# Patient Record
Sex: MALE | Race: WHITE | NOT HISPANIC OR LATINO | Employment: OTHER | URBAN - METROPOLITAN AREA
[De-identification: names, ages, dates, MRNs, and addresses within clinical notes are randomized per-mention and may not be internally consistent; named-entity substitution may affect disease eponyms.]

---

## 2018-01-23 ENCOUNTER — OFFICE VISIT CONVERTED (OUTPATIENT)
Dept: GASTROENTEROLOGY | Facility: CLINIC | Age: 83
End: 2018-01-23
Attending: INTERNAL MEDICINE

## 2018-05-02 ENCOUNTER — OFFICE VISIT CONVERTED (OUTPATIENT)
Dept: GASTROENTEROLOGY | Facility: CLINIC | Age: 83
End: 2018-05-02
Attending: PHYSICIAN ASSISTANT

## 2018-09-17 ENCOUNTER — CONVERSION ENCOUNTER (OUTPATIENT)
Dept: FAMILY MEDICINE CLINIC | Facility: CLINIC | Age: 83
End: 2018-09-17

## 2018-09-17 ENCOUNTER — OFFICE VISIT CONVERTED (OUTPATIENT)
Dept: FAMILY MEDICINE CLINIC | Facility: CLINIC | Age: 83
End: 2018-09-17
Attending: FAMILY MEDICINE

## 2018-09-24 ENCOUNTER — OFFICE VISIT CONVERTED (OUTPATIENT)
Dept: SURGERY | Facility: CLINIC | Age: 83
End: 2018-09-24
Attending: SURGERY

## 2018-09-24 ENCOUNTER — CONVERSION ENCOUNTER (OUTPATIENT)
Dept: SURGERY | Facility: CLINIC | Age: 83
End: 2018-09-24

## 2018-10-09 ENCOUNTER — OFFICE VISIT CONVERTED (OUTPATIENT)
Dept: SURGERY | Facility: CLINIC | Age: 83
End: 2018-10-09
Attending: SURGERY

## 2018-10-09 ENCOUNTER — CONVERSION ENCOUNTER (OUTPATIENT)
Dept: SURGERY | Facility: CLINIC | Age: 83
End: 2018-10-09

## 2018-11-06 ENCOUNTER — OFFICE VISIT CONVERTED (OUTPATIENT)
Dept: CARDIOLOGY | Facility: CLINIC | Age: 83
End: 2018-11-06
Attending: SPECIALIST

## 2019-04-09 ENCOUNTER — HOSPITAL ENCOUNTER (OUTPATIENT)
Dept: FAMILY MEDICINE CLINIC | Facility: CLINIC | Age: 84
Discharge: HOME | End: 2019-04-09
Attending: FAMILY MEDICINE

## 2019-04-15 ENCOUNTER — OFFICE VISIT CONVERTED (OUTPATIENT)
Dept: SURGERY | Facility: CLINIC | Age: 84
End: 2019-04-15
Attending: SURGERY

## 2019-04-17 ENCOUNTER — HOSPITAL ENCOUNTER (OUTPATIENT)
Dept: ULTRASOUND IMAGING | Facility: HOSPITAL | Age: 84
Discharge: HOME | End: 2019-04-17
Attending: FAMILY MEDICINE

## 2019-04-22 ENCOUNTER — OFFICE VISIT CONVERTED (OUTPATIENT)
Dept: GASTROENTEROLOGY | Facility: CLINIC | Age: 84
End: 2019-04-22
Attending: PHYSICIAN ASSISTANT

## 2019-04-29 ENCOUNTER — OFFICE VISIT CONVERTED (OUTPATIENT)
Dept: ORTHOPEDIC SURGERY | Facility: CLINIC | Age: 84
End: 2019-04-29
Attending: ORTHOPAEDIC SURGERY

## 2019-05-10 ENCOUNTER — OFFICE VISIT CONVERTED (OUTPATIENT)
Dept: CARDIOLOGY | Facility: CLINIC | Age: 84
End: 2019-05-10
Attending: SPECIALIST

## 2019-09-09 ENCOUNTER — OFFICE VISIT CONVERTED (OUTPATIENT)
Dept: FAMILY MEDICINE CLINIC | Facility: CLINIC | Age: 84
End: 2019-09-09
Attending: FAMILY MEDICINE

## 2019-09-09 ENCOUNTER — HOSPITAL ENCOUNTER (OUTPATIENT)
Dept: FAMILY MEDICINE CLINIC | Facility: CLINIC | Age: 84
Discharge: HOME | End: 2019-09-09
Attending: FAMILY MEDICINE

## 2020-01-03 ENCOUNTER — CONVERSION ENCOUNTER (OUTPATIENT)
Dept: CARDIOLOGY | Facility: CLINIC | Age: 85
End: 2020-01-03

## 2020-01-03 ENCOUNTER — OFFICE VISIT CONVERTED (OUTPATIENT)
Dept: CARDIOLOGY | Facility: CLINIC | Age: 85
End: 2020-01-03
Attending: SPECIALIST

## 2020-02-04 ENCOUNTER — OFFICE VISIT CONVERTED (OUTPATIENT)
Dept: CARDIOLOGY | Facility: CLINIC | Age: 85
End: 2020-02-04
Attending: SPECIALIST

## 2020-02-04 ENCOUNTER — CONVERSION ENCOUNTER (OUTPATIENT)
Dept: CARDIOLOGY | Facility: CLINIC | Age: 85
End: 2020-02-04

## 2020-02-18 ENCOUNTER — OFFICE VISIT CONVERTED (OUTPATIENT)
Dept: FAMILY MEDICINE CLINIC | Facility: CLINIC | Age: 85
End: 2020-02-18
Attending: FAMILY MEDICINE

## 2020-02-25 ENCOUNTER — OFFICE VISIT CONVERTED (OUTPATIENT)
Dept: CARDIOLOGY | Facility: CLINIC | Age: 85
End: 2020-02-25
Attending: SPECIALIST

## 2020-03-03 ENCOUNTER — HOSPITAL ENCOUNTER (OUTPATIENT)
Dept: SURGERY | Facility: CLINIC | Age: 85
Discharge: HOME | End: 2020-03-03
Attending: NURSE PRACTITIONER

## 2020-03-03 ENCOUNTER — OFFICE VISIT CONVERTED (OUTPATIENT)
Dept: SURGERY | Facility: CLINIC | Age: 85
End: 2020-03-03
Attending: NURSE PRACTITIONER

## 2020-03-03 LAB
APPEARANCE UR: CLEAR
BILIRUB UR QL: NEGATIVE
COLOR UR: YELLOW
CONV BACTERIA: NEGATIVE
CONV COLLECTION SOURCE (UA): ABNORMAL
CONV HYALINE CASTS IN URINE MICRO: ABNORMAL /[LPF]
CONV UROBILINOGEN IN URINE BY AUTOMATED TEST STRIP: 0.2 {EHRLICHU}/DL (ref 0.1–1)
GLUCOSE UR QL: NEGATIVE MG/DL
HGB UR QL STRIP: ABNORMAL
KETONES UR QL STRIP: NEGATIVE MG/DL
LEUKOCYTE ESTERASE UR QL STRIP: NEGATIVE
NITRITE UR QL STRIP: NEGATIVE
PH UR STRIP.AUTO: 6.5 [PH] (ref 5–8)
PROT UR QL: NEGATIVE MG/DL
RBC #/AREA URNS HPF: ABNORMAL /[HPF]
SP GR UR: 1.01 (ref 1–1.03)
WBC #/AREA URNS HPF: ABNORMAL /[HPF]

## 2020-03-09 ENCOUNTER — CONVERSION ENCOUNTER (OUTPATIENT)
Dept: CARDIOLOGY | Facility: CLINIC | Age: 85
End: 2020-03-09
Attending: SPECIALIST

## 2020-03-16 ENCOUNTER — OFFICE VISIT CONVERTED (OUTPATIENT)
Dept: SURGERY | Facility: CLINIC | Age: 85
End: 2020-03-16
Attending: SURGERY

## 2020-03-16 ENCOUNTER — CONVERSION ENCOUNTER (OUTPATIENT)
Dept: SURGERY | Facility: CLINIC | Age: 85
End: 2020-03-16

## 2020-04-22 ENCOUNTER — TELEPHONE CONVERTED (OUTPATIENT)
Dept: GASTROENTEROLOGY | Facility: CLINIC | Age: 85
End: 2020-04-22
Attending: PHYSICIAN ASSISTANT

## 2020-04-22 ENCOUNTER — TELEPHONE CONVERTED (OUTPATIENT)
Dept: UROLOGY | Facility: CLINIC | Age: 85
End: 2020-04-22
Attending: UROLOGY

## 2020-05-06 ENCOUNTER — HOSPITAL ENCOUNTER (OUTPATIENT)
Dept: GASTROENTEROLOGY | Facility: HOSPITAL | Age: 85
Setting detail: HOSPITAL OUTPATIENT SURGERY
Discharge: HOME | End: 2020-05-06
Attending: INTERNAL MEDICINE

## 2020-05-06 LAB — GLUCOSE BLD-MCNC: 114 MG/DL (ref 70–99)

## 2020-06-19 ENCOUNTER — CONVERSION ENCOUNTER (OUTPATIENT)
Dept: SURGERY | Facility: CLINIC | Age: 85
End: 2020-06-19

## 2020-06-19 ENCOUNTER — PROCEDURE VISIT CONVERTED (OUTPATIENT)
Dept: UROLOGY | Facility: CLINIC | Age: 85
End: 2020-06-19
Attending: UROLOGY

## 2020-07-13 ENCOUNTER — HOSPITAL ENCOUNTER (OUTPATIENT)
Dept: FAMILY MEDICINE CLINIC | Facility: CLINIC | Age: 85
Discharge: HOME | End: 2020-07-13
Attending: SURGERY

## 2020-07-15 LAB — SARS-COV-2 RNA SPEC QL NAA+PROBE: NOT DETECTED

## 2020-07-31 ENCOUNTER — OFFICE VISIT CONVERTED (OUTPATIENT)
Dept: SURGERY | Facility: CLINIC | Age: 85
End: 2020-07-31
Attending: SURGERY

## 2020-09-15 ENCOUNTER — OFFICE VISIT CONVERTED (OUTPATIENT)
Dept: CARDIOLOGY | Facility: CLINIC | Age: 85
End: 2020-09-15
Attending: SPECIALIST

## 2020-09-15 ENCOUNTER — CONVERSION ENCOUNTER (OUTPATIENT)
Dept: CARDIOLOGY | Facility: CLINIC | Age: 85
End: 2020-09-15

## 2020-10-19 ENCOUNTER — PROCEDURE VISIT CONVERTED (OUTPATIENT)
Dept: UROLOGY | Facility: CLINIC | Age: 85
End: 2020-10-19
Attending: UROLOGY

## 2021-01-20 ENCOUNTER — PROCEDURE VISIT CONVERTED (OUTPATIENT)
Dept: UROLOGY | Facility: CLINIC | Age: 86
End: 2021-01-20
Attending: UROLOGY

## 2021-03-16 ENCOUNTER — OFFICE VISIT CONVERTED (OUTPATIENT)
Dept: CARDIOLOGY | Facility: CLINIC | Age: 86
End: 2021-03-16
Attending: SPECIALIST

## 2021-04-02 ENCOUNTER — OFFICE VISIT CONVERTED (OUTPATIENT)
Dept: FAMILY MEDICINE CLINIC | Facility: CLINIC | Age: 86
End: 2021-04-02
Attending: FAMILY MEDICINE

## 2021-04-08 ENCOUNTER — OFFICE VISIT CONVERTED (OUTPATIENT)
Dept: SURGERY | Facility: CLINIC | Age: 86
End: 2021-04-08
Attending: SURGERY

## 2021-04-08 ENCOUNTER — CONVERSION ENCOUNTER (OUTPATIENT)
Dept: SURGERY | Facility: CLINIC | Age: 86
End: 2021-04-08

## 2021-04-12 ENCOUNTER — HOSPITAL ENCOUNTER (OUTPATIENT)
Dept: SURGERY | Facility: CLINIC | Age: 86
Discharge: HOME | End: 2021-04-12
Attending: SURGERY

## 2021-04-12 ENCOUNTER — PROCEDURE VISIT CONVERTED (OUTPATIENT)
Dept: SURGERY | Facility: CLINIC | Age: 86
End: 2021-04-12
Attending: SURGERY

## 2021-04-21 ENCOUNTER — PROCEDURE VISIT CONVERTED (OUTPATIENT)
Dept: UROLOGY | Facility: CLINIC | Age: 86
End: 2021-04-21
Attending: NURSE PRACTITIONER

## 2021-04-29 ENCOUNTER — HOSPITAL ENCOUNTER (OUTPATIENT)
Dept: PERIOP | Facility: HOSPITAL | Age: 86
Setting detail: HOSPITAL OUTPATIENT SURGERY
Discharge: HOME | End: 2021-04-29
Attending: UROLOGY

## 2021-04-29 LAB
GLUCOSE BLD-MCNC: 136 MG/DL (ref 70–99)
GLUCOSE BLD-MCNC: 172 MG/DL (ref 70–99)

## 2021-05-05 ENCOUNTER — CONVERSION ENCOUNTER (OUTPATIENT)
Dept: SURGERY | Facility: CLINIC | Age: 86
End: 2021-05-05

## 2021-05-05 ENCOUNTER — OFFICE VISIT CONVERTED (OUTPATIENT)
Dept: UROLOGY | Facility: CLINIC | Age: 86
End: 2021-05-05
Attending: UROLOGY

## 2021-05-05 LAB
BILIRUB UR QL STRIP: NEGATIVE
COLOR UR: YELLOW
CONV BACTERIA IN URINE MICRO: 0
CONV CALCIUM OXALATE CRYSTALS /HPF IN URINE SEDIMENT BY MICROSCOPY: 0
CONV CLARITY OF URINE: CLEAR
CONV PROTEIN IN URINE BY AUTOMATED TEST STRIP: NEGATIVE
CONV UROBILINOGEN IN URINE BY AUTOMATED TEST STRIP: 0.2
GLUCOSE UR QL: NEGATIVE
HGB UR QL STRIP: NORMAL
KETONES UR QL STRIP: NEGATIVE
LEUKOCYTE ESTERASE UR QL STRIP: NORMAL
NITRITE UR QL STRIP: NEGATIVE
PH UR STRIP.AUTO: 7 [PH]
RBC #/AREA URNS HPF: 5 /[HPF]
RENAL EPI CELLS #/AREA URNS HPF: 0 /[HPF]
SP GR UR: 1.01
SQUAMOUS SPT QL MICRO: 0
WBC #/AREA URNS HPF: 0 /[HPF]

## 2021-05-07 NOTE — PROGRESS NOTES
Progress Note      Patient Name: Toney Neri   Patient ID: [id number]   Sex: Male   YOB: 1935        Visit Date: September 17, 2018    Provider: Alverto Lopez MD   Location: [facility]   Location Address: [address] [place] [address]  [id number]   Location Phone: [phone]          Chief Complaint     order for diabetic shoes  black head on back may needs to be lanced       History Of Present Illness  Toney Nrei is a 83 year old /White male who presents for evaluation and treatment of:      left cyst on back x several months- has scarring from where it was cut out previously in [place] and area came back    need diabetic shoes    [facility]       Past Medical History  Disease Name Date Onset Notes   Allergic rhinitis --  --    Cataract --  --    Diabetes --  --    GERD (gastroesophageal reflux disease) --  --    Hypertension --  --    Prostate Disorder --  --    Sleep apnea --  --          Past Surgical History  Procedure Name Date Notes   Cataract surgery --  --    Hernia --  --    Shoulder surgery --  --    Tonsillectomy --  --          Medication List  Name Date Started Instructions   Artificial Tears (polyvin alc) 1.4 % ophthalmic (eye) drops  --    aspirin 81 mg oral tablet,chewable  chew 1 tablet (81 mg) by oral route once daily   atenolol 50 mg oral tablet  1 tablet in AM, 2 tablets in PM   celecoxib 200 mg oral capsule  take 1 capsule (200 mg) by oral route once daily   fluticasone 50 mcg/actuation nasal spray,suspension  inhale 1 spray (50 mcg) in each nostril by intranasal route 2 times per day   glyburide 5 mg oral tablet  take 1 tablet (5 mg) by oral route once daily before breakfast   hydrochlorothiazide 25 mg oral tablet  take 1 tablet (25 mg) by oral route once daily   isosorbide dinitrate 20 mg oral tablet  take 1 tablet by oral route daily   loratadine 10 mg oral capsule  take 1 capsule by oral route daily   metformin  1,000 mg oral tablet  take 1 tablet (1,000 mg) by oral route 2 times per day with morning and evening meals   nifedipine 90 mg oral tablet extended release  take 1 tablet (90 mg) by oral route once daily   pantoprazole 40 mg oral tablet,delayed release (DR/EC)  take 1 tablet (40 mg) by oral route once daily   simvastatin 40 mg oral tablet  take 1 tablet (40 mg) by oral route once daily in the evening   terazosin 10 mg oral capsule  take 1 capsule by oral route   triamcinolone acetonide 0.1 % topical cream  --          Allergy List  Allergen Name Date Reaction Notes   NO KNOWN DRUG ALLERGIES --  --  --          Social History  Finding Status Start/Stop Quantity Notes   Alcohol Current some day --/-- --  <7 wkly   Tobacco Former 14/-- 1-2 pk daily 30 yrs         Review of Systems  · Constitutional  o Denies  o : fatigue, fever  · Cardiovascular  o Denies  o : chest pain, palpitations  · Respiratory  o Denies  o : shortness of breath, cough  · Integument  o Admits  o : new skin lesions      Vitals  Date Time BP Position Site L\R Cuff Size HR RR TEMP(F) WT  HT  BMI kg/m2 BSA m2 O2 Sat        09/17/2018 02:56 /80 Sitting    63 - R  96.5 263lbs 0oz    94 %           Physical Examination  · Constitutional  o Appearance  o : well developed, well-nourished, in no acute distress  · Eyes  o Conjunctivae  o : conjunctivae normal  · Neck  o Inspection/Palpation  o : supple  o Thyroid  o : no thyromegaly  · Respiratory  o Respiratory Effort  o : breathing unlabored  o Auscultation of Lungs  o : clear to ascultation  · Cardiovascular  o Heart  o :   § Auscultation of Heart  § : regular rate and rhythm  o Peripheral Vascular System  o :   § Extremities  § : no edema  · Gastrointestinal  o Abdomen  o : soft, non-tender, non-distended, + bowel sounds, no hepatosplenomegaly, no masses palpated  · Musculoskeletal  o General  o :   § General Musculoskeletal  § : No joint swelling or deformity., Muscle tone, strength, and  development grossly normal.  · Skin and Subcutaneous Tissue  o General Inspection  o : 1cm soft, moveable lesion left midback  · Neurologic  o Gait and Station  o :   § Gait Screening  § : normal gait  · Psychiatric  o Mood and Affect  o : mood normal, affect appropriate  · Left DM Foot Exam  o Sensation  o : normal sensory exam perceptible to 10-gram nylon monofilament exam (5/5), vibration and light touch.  o Visual Inspection  o : callous on sole  o Vascular  o : palpable dorsalis pedis and posterir tibialis pulses present, normal capillary refill  · Right DM Foot Exam  o Sensation  o : normal sensory exam perceptible to 10-gram nylon monofilament exam (5/5), vibration and light touch.  o Visual Inspection  o : callous on sole  o Vascular  o : palpable dorsalis pedis and posterir tibialis pulses present, normal capillary refill          Assessment  · Diabetes mellitus, type 2     250.00/E11.9  · Skin lesion of back     709.9/L98.9  · CAD (coronary artery disease)     414.00/I25.10  · Irregular heart beats     427.9/I49.9      Plan  · Orders  o Diabetic Foot (Motor and Sensory) Exam Completed [facility] (, , 2028F) - 250.00/E11.9 - 09/17/2018  o ACO-41: Dilated Diabetic eye exam completed this year and results in chart/reviewed (2022F) - 250.00/E11.9 - 09/17/2018  o ACO-39: Current medications updated and reviewed () - - 09/17/2018  o General Surgery Consult (GNSUR) - 709.9/L98.9 - 09/17/2018  o CARDIOLOGY CONSULTATION (CARDI) - 414.00/I25.10, 427.9/I49.9 - 09/17/2018   pt wants to see different cardiologist than Dr. Fu  · Instructions  o Patient was educated/instructed on their diagnosis, treatment and medications prior to discharge from the clinic today.  o prescribed diabetic shoes.            Electronically Signed by: Alverto Lopez MD -Author on September 17, 2018 03:26:03 PM

## 2021-05-07 NOTE — PROGRESS NOTES
Progress Note      Patient Name: Toney Neri   Patient ID: [id number]   Sex: Male   YOB: 1935        Visit Date: February 18, 2020    Provider: Alverto Lopez MD   Location: [facility]   Location Address: [address] [place] [address] [place]  [address]   Location Phone: [phone]          Chief Complaint  · Follow up office visit within 7 calender days of discharge from inpatient status (high complexity).     Follow up. Discharge from hospital for chest pain.       History Of Present Illness  Toney Neri is a 84 year old /White male who presents for evaluation and treatment of:   FOLLOW UP OFFICE VISIT WITHIN 7 CALENDER DAYS OF INPATIENT STATUS (SEVERE COMPLEXITY)  Toney Neri presents to office for follow up post discharge from inpatient status within 7 calender days. Patient was contacted within 2 business days via phone conversation. Documentation of that phone contact is present in the patient's electronic chart. Patient was admitted to an inpatient facility on 02/10/2020 and discharged on 02/12/2020 due to: chest pain. Pt doing better. Chest pain ruled not to be heart- has resolved now. [facility] in [facility]- [facility]   Admitting MD: Dr. dR Morillo   His discharge summary has been reviewed and placed in the patient's electronic chart.   Patient's problem list is: Diabetes and Hypertension   Patient's medication list has been updated/reconcilled from discharge summary. Medication list is: acetaminophen 325 mg oral tablet, Artificial Tears (polyvin alc) 1.4 % ophthalmic (eye) drops, aspirin 81 mg oral tablet,chewable, atenolol 50 mg oral tablet, Eliquis 5 mg oral tablet, fluticasone 50 mcg/actuation nasal spray,suspension, glyburide 5 mg oral tablet, hydrochlorothiazide 25 mg oral tablet, isosorbide dinitrate 20 mg oral tablet, loratadine 10 mg oral capsule, magnesium oxide 400  mg magnesium oral tablet, melatonin 3 mg oral tablet, metformin 1,000 mg oral tablet, nifedipine 90 mg oral tablet extended release, Nizoral 2 % topical shampoo, potassium chloride 10 mEq oral tablet extended release, ProAir HFA 90 mcg/actuation inhalation HFA aerosol inhaler, ranitidine HCl 150 mg oral tablet, Refresh Optive Advanced 0.5-1-0.5 % ophthalmic (eye) drops, simvastatin 40 mg oral tablet, terazosin 10 mg oral capsule, and triamcinolone acetonide 0.1 % topical cream       Past Medical History  Disease Name Date Onset Notes   Allergic rhinitis --  --    Cataract --  --    Diabetes --  --    GERD (gastroesophageal reflux disease) --  --    Hypertension --  --    Prostate Disorder --  --    Sleep apnea --  --          Past Surgical History  Procedure Name Date Notes   Cataract surgery --  --    Hernia --  --    Shoulder surgery --  --    Tonsillectomy --  --          Medication List  Name Date Started Instructions   acetaminophen 325 mg oral tablet 02/11/2020 take 1 tablet (325 mg) by oral route every 4 hours as needed   Artificial Tears (polyvin alc) 1.4 % ophthalmic (eye) drops  --    aspirin 81 mg oral tablet,chewable  chew 1 tablet (81 mg) by oral route once daily   atenolol 50 mg oral tablet  1 tablet in AM, 2 tablets in PM   Eliquis 5 mg oral tablet 02/11/2020 take 1 tablet (5 mg) by oral route 2 times per day for 90 days   fluticasone 50 mcg/actuation nasal spray,suspension  inhale 1 spray (50 mcg) in each nostril by intranasal route 2 times per day   glyburide 5 mg oral tablet  take 1 tablet (5 mg) by oral route once daily before breakfast   hydrochlorothiazide 25 mg oral tablet  take 1 tablet (25 mg) by oral route once daily   isosorbide dinitrate 20 mg oral tablet  take 1 tablet by oral route daily   loratadine 10 mg oral capsule  take 1 capsule by oral route daily   magnesium oxide 400 mg magnesium oral tablet  take 1 tablet by oral route daily   melatonin 3 mg oral tablet  take 2 tablets by oral  route daily   metformin 1,000 mg oral tablet  take 1 tablet (1,000 mg) by oral route 2 times per day with morning and evening meals   nifedipine 90 mg oral tablet extended release  take 1 tablet (90 mg) by oral route once daily   Nizoral 2 % topical shampoo  apply shampoo by topical route twice weekly with at least 3 days between each shampooing for 30 days   potassium chloride 10 mEq oral tablet extended release  take 1 tablet (10 meq) by oral route once daily with food   ProAir HFA 90 mcg/actuation inhalation HFA aerosol inhaler  inhale 1 puff (90 mcg) by inhalation route every 6 hours as needed for 75 days   ranitidine HCl 150 mg oral tablet  take 1 tablet (150 mg) by oral route 2 times per day   Refresh Optive Advanced 0.5-1-0.5 % ophthalmic (eye) drops  instill 2 drops in affected eye daily   simvastatin 40 mg oral tablet  take 1 tablet (40 mg) by oral route once daily in the evening   terazosin 10 mg oral capsule  take 1 capsule by oral route   triamcinolone acetonide 0.1 % topical cream  --          Allergy List  Allergen Name Date Reaction Notes   NO KNOWN DRUG ALLERGIES --  --  --          Social History  Finding Status Start/Stop Quantity Notes   Alcohol Current some day --/-- --  <7 wkly   Tobacco Former 14/-- 1-2 pk daily 30 yrs         Review of Systems  · Constitutional  o Denies  o : fatigue, fever  · Cardiovascular  o Denies  o : chest pain, palpitations  · Respiratory  o Denies  o : shortness of breath, cough  · Gastrointestinal  o Denies  o : nausea, vomiting, diarrhea  · Psychiatric  o Denies  o : anxiety, depression      Vitals  Date Time BP Position Site L\R Cuff Size HR RR TEMP (F) WT  HT  BMI kg/m2 BSA m2 O2 Sat        02/18/2020 03:45 /80 Sitting    90 - R  98 250lbs 16oz    93 %          Physical Examination  · Constitutional  o Appearance  o : well developed, well-nourished, in no acute distress  · Respiratory  o Respiratory Effort  o : breathing unlabored  o Auscultation of  Lungs  o : clear to ascultation  · Cardiovascular  o Heart  o :   § Auscultation of Heart  § : regular rate and rhythm  o Peripheral Vascular System  o :   § Extremities  § : no edema  · Gastrointestinal  o Abdomen  o : soft, non-tender, non-distended, + bowel sounds, no hepatosplenomegaly, no masses palpated  · Musculoskeletal  o General  o :   § General Musculoskeletal  § : No joint swelling or deformity., Muscle tone, strength, and development grossly normal.  · Neurologic  o Gait and Station  o :   § Gait Screening  § : normal gait  · Psychiatric  o Mood and Affect  o : mood normal, affect appropriate  · Left DM Foot Exam  o Sensation  o : normal sensory exam perceptible to 10-gram nylon monofilament exam (5/5), vibration and light touch.  o Visual Inspection  o : normal except for callous on sole and toenail deformities  o Vascular  o : palpable dorsalis pedis and posterir tibialis pulses present, normal capillary refill  · Right DM Foot Exam  o Sensation  o : normal sensory exam perceptible to 10-gram nylon monofilament exam (5/5), vibration and light touch.  o Visual Inspection  o : normal except for callous on sole and toenail deformities  o Vascular  o : palpable dorsalis pedis and posterir tibialis pulses present, normal capillary refill          Assessment  · Diabetes mellitus, type 2     250.00/E11.9  · Essential hypertension     401.9/I10  · Hyperlipidemia     272.4/E78.5  · Elevated PSA     790.93/R97.20    Problems Reconciled  Plan  · Orders  o Diabetic Foot (Motor and Sensory) Exam Completed [facility] (, , 2028F) - 250.00/E11.9 - 02/18/2020  o ACO-13: Fall Risk Screening with no falls in past year or only one fall without injury in the past year (1101F) - - 02/18/2020  o ACO-14: Influenza immunization administered or previously received () - - 02/18/2020  o ACO-15: Pneumococcal Vaccine Administered or Previously Received (4040F) - - 02/18/2020  o ACO-18: Negative screen for clinical  depression using a standardized tool () - - 02/18/2020  o ACO-27: Most recent 2019 HgbA1c less than 7 HMH (3044F) - - 02/18/2020  o ACO-39: Current medications updated and reviewed () - - 02/18/2020  o ACO - Pt declines to or was not able to provide an Advance Care Plan or name a Surrogate Decision Maker (1124F) - - 02/18/2020  o Discharge medications reconciled with the current medication list (1111F) - - 02/18/2020  o UROLOGY CONSULTATION (UROLO) - 790.93/R97.20 - 02/18/2020   PSA up to 4.5- son recently had prostate cancer- need eval and tx  · Medications  o Medications have been Reconciled  o Transition of Care or Provider Policy  · Instructions  o Advised that cheeses and other sources of dairy fats, animal fats, fast food, and the extras (candy, pasteries, pies, doughnuts and cookies) all contain LDL raising nutrients. Advised to increase fruits, vegetables, whole grains, and to monitor portion sizes.   o Patient was educated/instructed on their diagnosis, treatment and medications prior to discharge from the clinic today.  o Patient discharge summary has been reviewed and placed in patient's electronic medical record.  o Patient received a phone call from my office within 2 business days of discharge from inpatient status, and documented within the patient's chart.  o Also patient was seen (face to face) for follow up evaluation within 7 calender days of discharge from inpatient status for a high complexity issue.  o Patient was educated on their diagnosis, treatment and any medication changes while being evaluated today.            Electronically Signed by: Alverto Lopez MD -Author on February 18, 2020 04:55:46 PM

## 2021-05-07 NOTE — PROGRESS NOTES
Progress Note      Patient Name: Toney Neri   Patient ID: [id number]   Sex: Male   YOB: 1935    Primary Care Provider: Alverto Lopez MD   Referring Provider: Alverto Lopez MD    Visit Date: April 2, 2021    Provider: Alverto Lopez MD   Location: [facility]   Location Address: [address] [place] [address]   [place]  [address]   Location Phone: [phone]          Chief Complaint     Cyst on back x 1.5 weeks       History Of Present Illness  Toney Neri is a 85 year old /White male who presents for evaluation and treatment of:      left upper back cystic lesion x 1.5 weeks- has had opened and drained- no fluctuance currently- has 5-6mm redness around area- is tender- has black ulceration in center       Past Medical History  Disease Name Date Onset Notes   Allergic rhinitis --  --    Cataract --  --    Diabetes --  --    GERD (gastroesophageal reflux disease) --  --    Hypertension --  --    Prostate Disorder --  --    Sleep apnea --  --          Past Surgical History  Procedure Name Date Notes   Cataract surgery --  --    Hernia --  --    Shoulder surgery --  --    Tonsillectomy --  --          Medication List  Name Date Started Instructions   acetaminophen 325 mg oral tablet 02/11/2020 take 1 tablet (325 mg) by oral route every 4 hours as needed   Artificial Tears (polyvin alc) 1.4 % ophthalmic (eye) drops  --    aspirin 81 mg oral tablet,chewable  chew 1 tablet (81 mg) by oral route once daily   atenolol 50 mg oral tablet  1 tablet in AM, 2 tablets in PM   celecoxib 200 mg oral capsule  take 1 capsule (200 mg) by oral route once daily   Eliquis 5 mg oral tablet 02/11/2020 take 1 tablet (5 mg) by oral route 2 times per day for 90 days   famotidine 20 mg oral tablet  take 1 tablet (20 mg) by oral route 2 times per day   fluticasone 50 mcg/actuation nasal spray,suspension  inhale 1 spray (50 mcg) in each nostril by intranasal route 2 times per day   glyburide 5  mg oral tablet  take 1 tablet (5 mg) by oral route once daily before breakfast   hydrochlorothiazide 25 mg oral tablet  take 1 tablet (25 mg) by oral route once daily   hydrocodone-acetaminophen 7.5-325 mg oral tablet 07/20/2020 take 1 tablet by oral route every 4 hours as needed for pain   isosorbide dinitrate 20 mg oral tablet  take 1 tablet by oral route daily   loratadine 10 mg oral capsule  take 1 capsule by oral route daily   magnesium oxide 400 mg magnesium oral tablet  take 1 tablet by oral route daily   melatonin 3 mg oral tablet  take 2 tablets by oral route daily   metformin 1,000 mg oral tablet  take 1 tablet (1,000 mg) by oral route 2 times per day with morning and evening meals   nifedipine 90 mg oral tablet extended release  take 1 tablet (90 mg) by oral route once daily   Nizoral 2 % topical shampoo  apply shampoo by topical route twice weekly with at least 3 days between each shampooing for 30 days   potassium chloride 10 mEq oral tablet extended release  take 1 tablet (10 meq) by oral route once daily with food   ProAir HFA 90 mcg/actuation inhalation HFA aerosol inhaler  inhale 1 puff (90 mcg) by inhalation route every 6 hours as needed for 75 days   Protonix 40 mg oral tablet,delayed release (DR/EC) 05/07/2020 take 1 tablet (40 mg) by oral route once daily for 90 days   ranitidine HCl 150 mg oral tablet  take 1 tablet (150 mg) by oral route 2 times per day   Refresh Optive Advanced 0.5-1-0.5 % ophthalmic (eye) drops  instill 2 drops in affected eye daily   simvastatin 40 mg oral tablet  take 1 tablet (40 mg) by oral route once daily in the evening   tamsulosin 0.4 mg oral capsule  take 1 capsule (0.4 mg) by oral route once daily 1/2 hour following the same meal each day   terazosin 10 mg oral capsule  take 1 capsule by oral route   triamcinolone acetonide 0.1 % topical cream  --          Allergy List  Allergen Name Date Reaction Notes   NO KNOWN DRUG ALLERGIES --  --  --          Social  "History  Finding Status Start/Stop Quantity Notes   Alcohol Current some day --/-- --  <7 wkly   Tobacco Former 14/-- 1-2 pk daily 30 yrs         Review of Systems  · Constitutional  o Denies  o : fatigue, fever  · Cardiovascular  o Denies  o : chest pain, palpitations  · Respiratory  o Denies  o : shortness of breath, cough  · Integument  o Admits  o : new skin lesions      Vitals  Date Time BP Position Site L\R Cuff Size HR RR TEMP (F) WT  HT  BMI kg/m2 BSA m2 O2 Sat FR L/min FiO2 HC       04/02/2021 03:53 /70 Sitting    72 - R  96.2 251lbs 16oz 5'  7.5\" 38.89 2.33 98 %            Physical Examination  · Constitutional  o Appearance  o : well developed, well-nourished, in no acute distress  · Respiratory  o Respiratory Effort  o : breathing unlabored  o Auscultation of Lungs  o : clear to ascultation  · Cardiovascular  o Heart  o :   § Auscultation of Heart  § : regular rate and rhythm  o Peripheral Vascular System  o :   § Extremities  § : no edema  · Musculoskeletal  o General  o :   § General Musculoskeletal  § : No joint swelling or deformity., Muscle tone, strength, and development grossly normal.  · Skin and Subcutaneous Tissue  o General Inspection  o : 2cm cystic lesion left upper back with 1cm ulcerated black center and 4-6redness, warmth, no fluctuance or discharge, area tender  · Neurologic  o Gait and Station  o :   § Gait Screening  § : normal gait  · Psychiatric  o Mood and Affect  o : mood normal, affect appropriate          Assessment  · Mass of skin of back     782.2/R22.2      Plan  · Orders  o ACO-39: Current medications updated and reviewed (1159F, ) - - 04/02/2021  o General Surgery Consult (GNSUR) - 782.2/R22.2 - 04/02/2021   cystic lesion of left upper back  · Medications  o Bactrim -160 mg oral tablet   SIG: take 1 tablet by oral route every 12 hours for 7 days   DISP: (14) Tablet with 0 refills  Prescribed on 04/02/2021     o Medications have been Reconciled  o Transition " of Care or Provider Policy  · Instructions  o Patient was educated/instructed on their diagnosis, treatment and medications prior to discharge from the clinic today.            Electronically Signed by: Alverto Lopez MD -Author on April 2, 2021 04:15:12 PM

## 2021-05-07 NOTE — PROGRESS NOTES
Progress Note      Patient Name: Toney Neri   Patient ID: [id number]   Sex: Male   YOB: 1935        Visit Date: September 9, 2019    Provider: Alverto Lopez MD   Location: [facility]   Location Address: [address] [place] [address]   [place]  [address]   Location Phone: [phone]          Chief Complaint     right lower leg hurts with bruising, no known injury. 1 week. Then hip started being tender about the same time. Now it feels like the ankle is feeling weird.       History Of Present Illness  Toney Neri is a 84 year old /White male who presents for evaluation and treatment of:      pt is seeing [employer] for PMD- they are checking labs and taking care of diabetes      right lower leg pain x 1 week- no recent injury- pain in anterior tibial and lateral ankle tenderness, no redness or swelling- pain is ache that radiates up to hip    xrays:no fxs       Past Medical History  Disease Name Date Onset Notes   Allergic rhinitis --  --    Cataract --  --    Diabetes --  --    GERD (gastroesophageal reflux disease) --  --    Hypertension --  --    Prostate Disorder --  --    Sleep apnea --  --          Past Surgical History  Procedure Name Date Notes   Cataract surgery --  --    Hernia --  --    Shoulder surgery --  --    Tonsillectomy --  --          Medication List  Name Date Started Instructions   Artificial Tears (polyvin alc) 1.4 % ophthalmic (eye) drops  --    aspirin 81 mg oral tablet,chewable  chew 1 tablet (81 mg) by oral route once daily   atenolol 50 mg oral tablet  1 tablet in AM, 2 tablets in PM   celecoxib 200 mg oral capsule  take 1 capsule (200 mg) by oral route once daily   fluticasone 50 mcg/actuation nasal spray,suspension  inhale 1 spray (50 mcg) in each nostril by intranasal route 2 times per day   glyburide 5 mg oral tablet  take 1 tablet (5 mg) by oral route once daily before breakfast   hydrochlorothiazide 25 mg oral tablet  take 1  "tablet (25 mg) by oral route once daily   isosorbide dinitrate 20 mg oral tablet  take 1 tablet by oral route daily   loratadine 10 mg oral capsule  take 1 capsule by oral route daily   metformin 1,000 mg oral tablet  take 1 tablet (1,000 mg) by oral route 2 times per day with morning and evening meals   nifedipine 90 mg oral tablet extended release  take 1 tablet (90 mg) by oral route once daily   pantoprazole 40 mg oral tablet,delayed release (DR/EC)  take 1 tablet (40 mg) by oral route once daily   simvastatin 40 mg oral tablet  take 1 tablet (40 mg) by oral route once daily in the evening   terazosin 10 mg oral capsule  take 1 capsule by oral route   triamcinolone acetonide 0.1 % topical cream  --          Allergy List  Allergen Name Date Reaction Notes   NO KNOWN DRUG ALLERGIES --  --  --          Social History  Finding Status Start/Stop Quantity Notes   Alcohol Current some day --/-- --  <7 wkly   Tobacco Former 14/-- 1-2 pk daily 30 yrs         Review of Systems  · Constitutional  o Denies  o : fatigue, fever  · Cardiovascular  o Denies  o : chest pain, palpitations  · Respiratory  o Denies  o : shortness of breath, cough  · Musculoskeletal  o Admits  o : ankle pain      Vitals  Date Time BP Position Site L\R Cuff Size HR RR TEMP (F) WT  HT  BMI kg/m2 BSA m2 O2 Sat        09/09/2019 01:11 PM 98/54 Sitting    62 - R  97.4 251lbs 16oz 5'  9.25\" 36.95 2.36 92 %          Physical Examination  · Constitutional  o Appearance  o : well developed, well-nourished, in no acute distress  · Respiratory  o Respiratory Effort  o : breathing unlabored  o Auscultation of Lungs  o : clear to ascultation  · Cardiovascular  o Heart  o :   § Auscultation of Heart  § : regular rate and rhythm  o Peripheral Vascular System  o :   § Extremities  § : no edema  · Musculoskeletal  o General  o :   § General Musculoskeletal  § : No joint swelling or deformity., Muscle tone, strength, and development grossly normal. Right anterior " tibial tenderness, lateral ankle tenderness, normal ROM, stable, no redness, no bruising, pulses intact, no warmth in lower leg.  · Neurologic  o Gait and Station  o :   § Gait Screening  § : normal gait  · Psychiatric  o Mood and Affect  o : mood normal, affect appropriate          Assessment  · Screening PSA (prostate specific antigen)     V76.44/Z12.5  · Tibial pain     733.90/M89.8X6  · Ankle pain     719.47/M25.579      Plan  · Orders  o ACO-39: Current medications updated and reviewed () - - 09/09/2019  o PSA Screening, Ultrasensitive, MEDICARE [facility] () - V76.44/Z12.5 - 09/09/2019  o Xray tib/fib right [facility] ([id number]-DB) - V76.44/Z12.5, 733.90/M89.8X6, 719.47/M25.579 - 09/09/2019  o Xray ankle right [facility] ([id number]) - V76.44/Z12.5, 733.90/M89.8X6, 719.47/M25.579 - 09/09/2019  · Instructions  o Patient was educated/instructed on their diagnosis, treatment and medications prior to discharge from the clinic today.  o Gave script for walking boot.            Electronically Signed by: Alverto Lopez MD -Author on September 9, 2019 02:35:53 PM

## 2021-05-09 VITALS
DIASTOLIC BLOOD PRESSURE: 54 MMHG | HEIGHT: 69 IN | SYSTOLIC BLOOD PRESSURE: 98 MMHG | TEMPERATURE: 97.4 F | HEART RATE: 62 BPM | BODY MASS INDEX: 37.33 KG/M2 | OXYGEN SATURATION: 92 % | WEIGHT: 252 LBS

## 2021-05-09 VITALS
DIASTOLIC BLOOD PRESSURE: 70 MMHG | TEMPERATURE: 96.2 F | HEIGHT: 67 IN | BODY MASS INDEX: 39.55 KG/M2 | OXYGEN SATURATION: 98 % | WEIGHT: 252 LBS | SYSTOLIC BLOOD PRESSURE: 121 MMHG | HEART RATE: 72 BPM

## 2021-05-09 VITALS
SYSTOLIC BLOOD PRESSURE: 140 MMHG | WEIGHT: 263 LBS | HEART RATE: 63 BPM | OXYGEN SATURATION: 94 % | DIASTOLIC BLOOD PRESSURE: 80 MMHG | TEMPERATURE: 96.5 F

## 2021-05-09 VITALS
HEART RATE: 90 BPM | DIASTOLIC BLOOD PRESSURE: 80 MMHG | OXYGEN SATURATION: 93 % | WEIGHT: 251 LBS | TEMPERATURE: 98 F | SYSTOLIC BLOOD PRESSURE: 125 MMHG

## 2021-05-10 NOTE — PROCEDURES
Procedure Note      Patient Name: Toney Neri   Patient ID: [id number]   Sex: Male   YOB: 1935    Primary Care Provider: Alverto Lopez MD   Referring Provider: Alverto Lopez MD    Visit Date: January 20, 2021    Provider: Yoli Le MD   Location: [facility]   Location Address: [address] [place] [address]  [id number]   Location Phone: [phone]          The patient presents for follow-up of superficial bladder cancer.   The patient was last seen three months ago . The patient is scheduled for repeat cysto and u/a . Cystoscopy on the last visit showed low grade superficial UC.   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 The most recent tumor pathology has not been done.   Imaging:  No imaging studies have been done.   Cystoscopy Procedure:  PROCEDURE: Flexible cystoscope was passed per urethra into the bladder without difficulty after proper consent. The bladder was inspected in a systematic meridian fashion. There were no tumors, lesions, stones, or other abnormalities noted within the bladder. Of note, there was no increased vascularity as well. Both ureteral orifices were identified and were normal in appearance. The flexible cystoscope was removed. The patient tolerated the procedure well.           Assessment  · Elevated PSA     790.93/R97.20  · Hematuria     599.70/R31.9  · Bladder cancer     188.9/C67.9      Plan  · Orders  o Cystoscopy (03501) - 599.70/R31.9 - 01/20/2021  · Medications  o Flomax 0.4 mg oral capsule   SIG: take 1 capsule by oral route 2 times a day for 90 days   DISP: (180) Capsule with 3 refills  Refilled on 01/20/2021     o Medications have been Reconciled  o Transition of Care or Provider Policy  · Instructions  o TIME OUT PROCEDURE: Correct patient and birth date; Correct procedure; Correct Physician; Consent signed  o Make a 3 month  follow-up for an in office cystoscopy for continued screening.  o I am starting him on Flomax.             Electronically Signed by: Yoli Le MD -Author on January 20, 2021 02:21:47 PM

## 2021-05-10 NOTE — PROCEDURES
Procedure Note      Patient Name: Toney Neri   Patient ID: [id number]   Sex: Male   YOB: 1935    Primary Care Provider: Alverto Lopez MD   Referring Provider: Alverto Lopez MD    Visit Date: October 19, 2020    Provider: Yoli Le MD   Location: [facility]   Location Address: [address] [place] [address]  [id number]   Location Phone: [phone]          The patient presents for follow-up of superficial bladder cancer.   The patient was last seen three months ago . The patient is scheduled for repeat cysto and u/a . Cystoscopy on the last visit showed low grade superficial UC.   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 The most recent tumor pathology has not been done.   Imaging:  No imaging studies have been done.   Cystoscopy Procedure:  PROCEDURE: Flexible cystoscope was passed per urethra into the bladder without difficulty after proper consent. The bladder was inspected in a systematic meridian fashion. There were no tumors, lesions, stones, or other abnormalities noted within the bladder. Of note, there was no increased vascularity as well. Both ureteral orifices were identified and were normal in appearance. The flexible cystoscope was removed. The patient tolerated the procedure well.           Assessment  · Elevated PSA     790.93/R97.20  · Hematuria     599.70/R31.9  · Bladder cancer     188.9/C67.9      Plan  · Orders  o Cystoscopy (16247) - 599.70/R31.9 - 10/19/2020  · Instructions  o TIME OUT PROCEDURE: Correct patient and birth date; Correct procedure; Correct Physician; Consent signed  o Make a 3 month follow-up for an in office cystoscopy for continued screening.            Electronically Signed by: Yoli Le MD -Author on October 19, 2020 02:34:05 PM

## 2021-05-10 NOTE — PROCEDURES
Procedure Note      Patient Name: Toney Neri   Patient ID: [id number]   Sex: Male   YOB: 1935    Primary Care Provider: Alverto Lopez MD   Referring Provider: Alverto Lopez MD    Visit Date: June 19, 2020    Provider: Yoli Le MD   Location: [facility]   Location Address: [address] [place] [address]  [id number]   Location Phone: [phone]          Cystoscopy Procedure:  PROCEDURE: Flexible cystoscope was passed per urethra into the bladder without difficulty after proper consent. The bladder was inspected in a systematic meridian fashion. There a papillary carpet of bladder tumor on the left lateral wall coming down to just behind the left ureteral orifice. Of note, there was no increased vascularity as well. Both ureteral orifices were identified and were normal in appearance. The flexible cystoscope was removed. The patient tolerated the procedure well.           Assessment  · Elevated PSA     790.93/R97.20  · Hematuria     599.70/R31.9  · Bladder cancer     188.9/C67.9      Plan  · Orders  o Cystoscopy (01627) - 599.70/R31.9 - 06/19/2020  · Medications  o Medications have been Reconciled  o Transition of Care or Provider Policy  · Instructions  o TIME OUT PROCEDURE: Correct patient and birth date; Correct procedure; Correct Physician; Consent signed  o Will need a TURBT in the operating room. He has a hernia surgery scheduled with Dr. Rodriguez will do the procedure at the same time.            Electronically Signed by: Yoli Le MD -Author on June 19, 2020 01:31:56 PM

## 2021-05-11 NOTE — H&P
History and Physical      Patient Name: Toney Neri   Patient ID: [id number]   Sex: Male   YOB: 1935    Primary Care Provider: Alverto Lopez MD   Referring Provider: Alverto Lopez MD    Visit Date: April 8, 2021    Provider: Michelet Vergara MD   Location: [facility]   Location Address: [address] [place] [address]  [id number]   Location Phone: [phone]          Chief Complaint  · Cyst on back      History Of Present Illness     Mr. Neri is seen for an infected sebaceous cyst on his back. He notes this has drained. He is on Eliquis. This was discussed with the patient. He was told to stop his Eliquis and I will plan excision in the office on Monday.       Past Medical History  Arthritis; Diabetes; Diabetes Mellitus, Type II; Elevated PSA; High cholesterol; Hypertension; Lesion, Skin; Limb Swelling; Myocardial Infarction; Reflux; Shortness of breath; Umbilical hernia; Urinary Stream Slowing         Past Surgical History  Ankle repair; Colonoscopy; EGD; Gallbladder; Repair of right rotator cuff; Tonsillectomy         Medication List  aspirin 81 mg oral tablet,delayed release (DR/EC); atenolol 100 mg oral tablet; Celebrex 200 mg oral capsule; Eliquis 5 mg oral tablet; famotidine 20 mg oral tablet; Flomax 0.4 mg oral capsule; FLOVENT 50 MCG DISKUS inhalation; glyburide 5 mg oral tablet; hydrochlorothiazide 25 mg oral tablet; isosorbide mononitrate 30 mg oral tablet extended release 24 hr; loratadine 10 mg oral tablet; metformin 1,000 mg oral tablet extended release 24hr; nifedipine 90 mg oral tablet extended release; nitroglycerin oral; Protonix 40 mg oral tablet,delayed release (DR/EC); simvastatin 40 mg oral tablet; terazosin 5 mg oral capsule; triamcinolone acetonide 0.1 % topical cream         Allergy List  NO KNOWN DRUG ALLERGIES         Family Medical History  Heart Disease; Diabetes, unspecified type; - No Family History of Colorectal Cancer; *Non Contributory;  "Prostate cancer; Family history of Arthritis         Social History  Alcohol Use (Current some day); Denies illicit substance abuse; lives alone; Recreational Drug Use (Never); Retired.; Tobacco (Former);          Review of Systems  · Cardiovascular  o Denies  o : chest pain on exertion, shortness of breath, lower extremity swelling  · Respiratory  o Denies  o : wheezing, chronic cough, coughing up blood  · Gastrointestinal  o Denies  o : diarrhea, chronic abdominal pain, reflux symptoms      Vitals  Date Time BP Position Site L\R Cuff Size HR RR TEMP (F) WT  HT  BMI kg/m2 BSA m2 O2 Sat FR L/min FiO2 HC       04/08/2021 02:34 PM       14  251lbs 16oz 5'  9\" 37.21 2.36                 Assessment  · Sebaceous Cyst     706.2/L72.3      Plan  · Medications  o Medications have been Reconciled  o Transition of Care or Provider Policy  · Instructions  o Electronically Identified Patient Education Materials Provided Electronically     Excision of the cyst in the office on Monday.             Electronically Signed by: Raven Cantrell-, -Author on April 12, 2021 09:48:50 AM  Electronically Co-signed by: Michelet Vergara MD -Reviewer on April 19, 2021 10:41:09 AM  "

## 2021-05-12 NOTE — PROGRESS NOTES
Quick Note      Patient Name: Toney Neri   Patient ID: [id number]   Sex: Male   YOB: 1935    Primary Care Provider: Alverto Lopez MD   Referring Provider: Alverto Lopez MD    Visit Date: April 22, 2020    Provider: Yoli Le MD   Location: [facility]   Location Address: [address] [place] [address]  [id number]   Location Phone: [phone]          History Of Present Illness  The patient is a 84 year old /White male, who presents on referral from Alverto Lopez MD, for an urological evaluation for an elevated PSA. The PSA was noted as elevated on 10/11/2019 and stated to be mildly elevated . A repeat PSA recently was 4.5 in April 2020 and he had a prostate MRI which was negative. There were no suspicious lesions .   The patient also reports slowing of his stream and nocturia, 2-3 times a night. Additionally, he denies burning, chills, fever, frequency, hematuria, hesitancy, and previous UTI's.   The patient is currently taking Flomax and He would like to increase his dose..   Petinent studies:  To date, diagnostic studies include: MRI. The MRI was performed 1 month ago and revealed abnormalities as specified in the report. He has not had any recent care for this condition.   TELEHEALTH TELEPHONE VISIT  Chief Complaint: Elevated PSA, BPH with obstruction and hematuria with thickened bladder wall   Toney Neri is a 84 year old /White male who is presenting for evaluation via telehealth telephone visit. Verbal consent obtained before beginning visit.   Provider spent 11 minutes with the patient during telehealth visit.   The following staff were present during this visit: Carolina Abel and Yoli Le MD   Past Medical History/Overview of Patient Symptoms     Patient admits to a family history of prostate cancer with his father and recently new diagnosis with his son.     Previous PSA:    04/2020:   4.5  10/2018:   3.77  5/2017:    4.62  5/2016:     4.24  6/2014:    4.38  2/2014:    2.93  2/2012:    2.93  10/2011:  2.70  8/2010:    2.34  10/2009:  2.85  5/2008:    2.80  10/2006:  3.20             Assessment  · Elevated prostate specific antigen (PSA)     790.93/R97.20  · Nocturia     788.43/R35.1  · Microscopic hematuria     599.72/R31.29      Plan  · Orders  o Physician Telephone Evaluation, 11-20 minutes (11381) - 790.93/R97.20, 788.43/R35.1, 599.72/R31.29 - 04/22/2020  · Medications  o Medications have been Reconciled  o Transition of Care or Provider Policy  · Instructions  o His prostate MRI is negative. He will need a cystoscopy in the office once we are able to schedule them. We will call him to arrange that.   o Plan Of Care:             Electronically Signed by: Yoli Le MD -Author on April 22, 2020 12:26:19 PM

## 2021-05-12 NOTE — PROGRESS NOTES
Quick Note      Patient Name: Toney Neri   Patient ID: [id number]   Sex: Male   YOB: 1935    Primary Care Provider: Alverto Lopez MD   Referring Provider: Alverto Lopez MD    Visit Date: April 22, 2020    Provider: Shin Alexandra PA-C   Location: [facility]   Location Address: [address] [place] [address]  [id number]   Location Phone: [phone]          History Of Present Illness  TELEHEALTH TELEPHONE VISIT  Chief Complaint: Annual follow up   Toney Neri is a 84 year old /White male who is presenting for evaluation via telehealth telephone visit. Verbal consent obtained before beginning visit.   Provider spent 11 minutes with the patient during telehealth visit.   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  This started 3 months ago and occurs daily.  He is having difficulty with swallowing his pills and would like an EGD with possible dilation to help.  His last EGD was in 01/ 2018 by Dr. 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  His colonoscopy was reviewed from 2012 by Dr. Rodriguez and was normal.           Assessment  · Ding esophagus     530.85/K22.70  · GERD (gastroesophageal reflux disease)     530.81/K21.9  · Dysphagia     787.20/R13.10      Plan  · Orders  o Physician Telephone Evaluation, 11-20 minutes (12282) - 530.85/K22.70, 530.81/K21.9, 787.20/R13.10 - 04/22/2020  o Consent for Esophagogastroduodenoscopy (EGD) with dilation - Possible risks/complications, benefits, and alternatives to surgical or invasive procedure have been explained to patient and/or legal guardian. - Patient has been evaluated and can tolerate anesthesia and/or sedation. Risks,  benefits, and alternatives to anesthesia and sedation have been explained to patient and/or legal guardian. (00762) - 530.85/K22.70, 530.81/K21.9, 787.20/R13.10 - 04/22/2020  · Medications  o Medications have been Reconciled  o Transition of Care or Provider Policy  · Instructions  o Plan Of Care: 84 year old male with Ding's esophagus, GERD currently on protonix 40mg and ranitidine 150mg with good control, and current dysphagia. I will schedule him for an urgent EGD given his symptoms and his inability to swallow his medications. He is to continue current medications. He will need to HOLD eliquis 2 days prior pending cardidac clearance.  o Chronic conditions reviewed and taken into consideration for today's treatment plan.  o Patient instructed to seek medical attention urgently for new or worsening symptoms.  o Patient was educated/instructed on their diagnosis, treatment and medications prior to discharge from the clinic today.            Electronically Signed by: Shin Alexandra PA-C -Author on April 22, 2020 11:08:16 AM  Electronically Co-signed by: Baron Thompson MD -Reviewer on April 23, 2020 04:20:37 PM

## 2021-05-13 NOTE — PROGRESS NOTES
Progress Note      Patient Name: Toney Neri   Patient ID: [id number]   Sex: Male   YOB: 1935    Primary Care Provider: Alverto Lopez MD   Referring Provider: Alverto Lopez MD    Visit Date: July 31, 2020    Provider: Vern Rodriguez MD   Location: [facility]   Location Address: [address] [place] [address]  [id number]   Location Phone: [phone]          Chief Complaint  · Follow Up Office Visit      History Of Present Illness     Mr. Neri came in today for evaluation. He is doing well following laparoscopic umbilical hernia repair. He is feeling pretty well. He is not too sore at this point.       Past Medical History  Arthritis; Diabetes; Diabetes Mellitus, Type II; Elevated PSA; High cholesterol; Hypertension; Lesion, Skin; Limb Swelling; Myocardial Infarction; Reflux; Shortness of breath; Umbilical hernia; Urinary Stream Slowing         Past Surgical History  Ankle repair; Colonoscopy; EGD; Gallbladder; Repair of right rotator cuff; Tonsillectomy         Medication List  aspirin 81 mg oral tablet,delayed release (DR/EC); atenolol 100 mg oral tablet; Celebrex 200 mg oral capsule; Eliquis 5 mg oral tablet; famotidine 20 mg oral tablet; Flomax 0.4 mg oral capsule; FLOVENT 50 MCG DISKUS inhalation; glyburide 5 mg oral tablet; hydrochlorothiazide 25 mg oral tablet; isosorbide mononitrate 30 mg oral tablet extended release 24 hr; loratadine 10 mg oral tablet; metformin 1,000 mg oral tablet extended release 24hr; nifedipine 90 mg oral tablet extended release; nitroglycerin oral; Protonix 40 mg oral tablet,delayed release (DR/EC); simvastatin 40 mg oral tablet; terazosin 5 mg oral capsule; triamcinolone acetonide 0.1 % topical cream         Allergy List  NO KNOWN DRUG ALLERGIES         Family Medical History  Heart Disease; Diabetes, unspecified type; - No Family History of Colorectal Cancer; *Non Contributory; Prostate cancer; Family history of Arthritis         Social  "History  Alcohol (Current - status unknown); Alcohol Use (Current some day); Denies illicit substance abuse; lives alone; Recreational Drug Use (Never); Retired.; Tobacco (Former);          Review of Systems  · Cardiovascular  o Denies  o : chest pain, irregular heart beats, rapid heart rate, chest pain on exertion, shortness of breath, lower extremity swelling  · Respiratory  o Denies  o : shortness of breath, wheezing, cough, wheezing, chronic cough, coughing up blood  · Gastrointestinal  o Denies  o : nausea, vomiting, diarrhea, chronic abdominal pain, reflux symptoms      Vitals  Date Time BP Position Site L\R Cuff Size HR RR TEMP (F) WT  HT  BMI kg/m2 BSA m2 O2 Sat HC       07/31/2020 10:36 AM       16  249lbs 0oz 5'  9\" 36.77 2.34           Physical Examination     Today on physical exam, he looks great. His incisions look good.           Assessment  · Postoperative Exam Following Surgery     V67.00       Doing great status post laparoscopic umbilical hernia repair.       Plan  · Medications  o Medications have been Reconciled  o Transition of Care or Provider Policy  · Instructions  o Follow up as needed.            Electronically Signed by: Raven Cantrell-, -Author on July 31, 2020 03:27:14 PM  Electronically Co-signed by: Vern Rodriguez MD -Reviewer on August 4, 2020 09:27:53 AM  "

## 2021-05-13 NOTE — PROGRESS NOTES
Progress Note      Patient Name: Toney Neri   Patient ID: [id number]   Sex: Male   YOB: 1935    Primary Care Provider: Alverto Lopez MD   Referring Provider: Alverto Lopez MD    Visit Date: September 15, 2020    Provider: Jacobo Sweet MD   Location: [facility]   Location Address: [address] [place] [address], [address]   [place]OL[place]NN Reich  [id number]   Location Phone: [phone]          Chief Complaint  · Atrial fibrillation       History Of Present Illness  Toney Neri is an 85 year old /White male with a history of permanent atrial fibrillation. No further chest pain. No palpitations. No dizziness.   CURRENT MEDICATIONS: include Eliquis 5 mg b.i.d.; aspirin 81 mg daily; Simvastatin 40 mg daily; Atenolol 50 mg 1 in the morning, 2 at night; Nifedipine 90 mg daily; HCTZ 25 mg daily; Potassium 10 mEq daily; Loratadine 10 mg daily; Glyburide 5 mg daily; Metformin 1000 mg b.i.d.; Fluticasone; Terazosin 10 mg daily; Pantoprazole 40 mg daily; ProAir; acetaminophen p.r.n.; magnesium oxide 400 mg daily; melatonin; Tamsulosin 0.4 mg b.i.d. The dosage and frequency of the medications were reviewed with the patient.   PAST MEDICAL HISTORY: Coronary artery disease; diabetes mellitus; essential hypertension; PVCs; palpitations; persistent atrial fibrillation.   FAMILY HISTORY: Negative for diabetes, hypertension and heart disease.   PSYCHOSOCIAL HISTORY: No history of mood changes or depression. He rarely drinks alcohol. He previously used tobacco but quit.       Review of Systems  · Cardiovascular  o Admits  o : swelling (feet, ankles, hands), shortness of breath while walking or lying flat  o Denies  o : palpitations (fast, fluttering, or skipping beats), chest pain or angina pectoris   · Respiratory  o Denies  o : chronic or frequent cough, asthma or wheezing      Vitals  Date Time BP Position Site L\R Cuff Size HR RR TEMP (F) WT  HT  BMI kg/m2 BSA m2 O2 Sat HC       09/15/2020 12:03  "/66 Sitting    76 - I   242lbs 16oz 5'  9.5\" 35.37 2.32           Physical Examination  · Constitutional  o Appearance  o : Awake, alert, cooperative, pleasant.  · Respiratory  o Inspection of Chest  o : No chest wall deformities, moving equal.  o Auscultation of Lungs  o : Good air entry with vesicular breath sounds.  · Cardiovascular  o Heart  o :   § Auscultation of Heart  § : S1 and S2 irregular. No S3. No S4. No murmurs.  o Peripheral Vascular System  o :   § Extremities  § : Peripheral pulses were well felt. No edema. No cyanosis.  · Gastrointestinal  o Abdominal Examination  o : No masses or organomegaly noted.          Assessment     ASSESSMENT AND PLAN:    1.  Permanent atrial fibrillation with a controlled heart rate:  Continue Eliquis for stroke prevention. Continue        Atenolol for rate control.  2.  See me back in 6 months.    Jacobo Sweet MD, [facility]  VITALIY/rafy           This note was transcribed by Perri Phillips.  rafy/VITALIY  The above service was transcribed by Perri Phillips, and I attest to the accuracy of the note.  VITALIY               Electronically Signed by: Perri Phillips-, -Author on September 21, 2020 04:18:20 AM  Electronically Co-signed by: Jacobo Sweet MD -Reviewer on September 21, 2020 01:28:23 PM  "

## 2021-05-14 VITALS
BODY MASS INDEX: 36.88 KG/M2 | SYSTOLIC BLOOD PRESSURE: 136 MMHG | DIASTOLIC BLOOD PRESSURE: 70 MMHG | WEIGHT: 249 LBS | HEIGHT: 69 IN | HEART RATE: 86 BPM

## 2021-05-14 VITALS
HEIGHT: 69 IN | BODY MASS INDEX: 36.14 KG/M2 | DIASTOLIC BLOOD PRESSURE: 66 MMHG | SYSTOLIC BLOOD PRESSURE: 137 MMHG | WEIGHT: 244 LBS

## 2021-05-14 VITALS
WEIGHT: 243 LBS | SYSTOLIC BLOOD PRESSURE: 108 MMHG | HEART RATE: 76 BPM | BODY MASS INDEX: 35.99 KG/M2 | HEIGHT: 69 IN | DIASTOLIC BLOOD PRESSURE: 66 MMHG

## 2021-05-14 VITALS — WEIGHT: 252 LBS | RESPIRATION RATE: 14 BRPM | HEIGHT: 69 IN | BODY MASS INDEX: 37.33 KG/M2

## 2021-05-14 VITALS — RESPIRATION RATE: 14 BRPM | WEIGHT: 253.5 LBS | BODY MASS INDEX: 37.55 KG/M2 | HEIGHT: 69 IN

## 2021-05-14 NOTE — PROGRESS NOTES
Progress Note      Patient Name: Toney Neri   Patient ID: [id number]   Sex: Male   YOB: 1935    Primary Care Provider: Alverto Lopez MD   Referring Provider: Alverto Lopez MD    Visit Date: April 21, 2021    Provider: Yoli Le MD   Location: [facility]   Location Address: [address] [place] [address]  [id number]   Location Phone: [phone]          Chief Complaint  · Outpatient History & Physical / Surgical Orders      History Of Present Illness  [facility]  Outpatient History and Physical Surgical Orders  Preadmission Location: Phone Preadmission Time: 12:30 PM   Which Facility: [facility] Surgery Date: 04/29/2021 Preadmission Testing Date: 04/27/2021   Patient's Name: Toney Neri YOB: 1935   Chief complaint/history present illness: Bladder Cancer, Bladder Tumor   Current Medication List: aspirin 81 mg oral tablet,delayed release (DR/EC), atenolol 100 mg oral tablet, Celebrex 200 mg oral capsule, Eliquis 5 mg oral tablet, famotidine 20 mg oral tablet, Flomax 0.4 mg oral capsule, FLOVENT 50 MCG DISKUS inhalation, glyburide 5 mg oral tablet, hydrochlorothiazide 25 mg oral tablet, isosorbide mononitrate 30 mg oral tablet extended release 24 hr, loratadine 10 mg oral tablet, metformin 1,000 mg oral tablet extended release 24hr, nifedipine 90 mg oral tablet extended release, nitroglycerin oral, Protonix 40 mg oral tablet,delayed release (DR/EC), simvastatin 40 mg oral tablet, terazosin 5 mg oral capsule, and triamcinolone acetonide 0.1 % topical cream   Allergies: NO KNOWN DRUG ALLERGIES   Significant past medical: Arthritis, Diabetes, Diabetes Mellitus, Type II, Elevated PSA, High cholesterol, Hypertension, Lesion, Skin, Limb Swelling, Myocardial Infarction, Reflux, Shortness of breath, Umbilical hernia, and Urinary Stream Slowing   Past Surgical History: Ankle repair, Colonoscopy, EGD, Gallbladder, Repair of  right rotator cuff, and Tonsillectomy   Examination of heart and lungs: Regular rate, rhythm, no murmur, gallop, rub, Breath sounds normal, no distress, and Abdomen soft, non-tender, BSx4 are positive             Assessment  · Encounter for preoperative examination for general surgical procedure     V72.84/Z01.818  · Urothelial carcinoma of bladder     188.9/C67.9  · Lesion of bladder     596.9/N32.9      Plan  · Orders  o General Urology Surgery Order ([facility]) - V72.84/Z01.818, 188.9/C67.9, 596.9/N32.9 - 04/29/2021  · Medications  o Medications have been Reconciled  o Transition of Care or Provider Policy  · Instructions  o Pre-Operative Orders: Sign permit for Cystoscopy, TURBT  o ****Patient Status****  o Outpatient   o **PATIENT INSTRUCTED NOT TO STOP HIS ASPIRIN. PATIENT INSTRUCTED TO STOP ELIQUIS TWO (2) DAYS PRIOR TO PROCEDURE.  o General Sedation  o IV Fluids: LR @ 100 cc/hour  o IV Antibiotics (on call to OR):  o Levaquin 500 mg IV OCTOR.  o RISK AND BENEFITS:  o Possible risks/complications, benefits and alternatives to surgical or invasive procedure have been explained to patient and/or legal guardian.  o Electronically Identified Patient Education Materials Provided Electronically            Electronically Signed by: Carolina Abel, -Author on April 22, 2021 09:17:06 AM  Electronically Co-signed by: Yoli eL MD -Reviewer on April 23, 2021 02:10:10 PM

## 2021-05-14 NOTE — PROGRESS NOTES
Progress Note      Patient Name: Toney Nrei   Patient ID: [id number]   Sex: Male   YOB: 1935    Primary Care Provider: Alverto Lopez MD   Referring Provider: Alverto Lopez MD    Visit Date: April 12, 2021    Provider: Michelet Vergara MD   Location: [facility]   Location Address: [address] [place] [address]  [id number]   Location Phone: [phone]          Chief Complaint  · Excision of seb cyst in office      History Of Present Illness     Mr. Neri is seen for a sebaceous cyst on his back. It is about 1.0 x 0.6 cm in size.       Past Medical History  Arthritis; Diabetes; Diabetes Mellitus, Type II; Elevated PSA; High cholesterol; Hypertension; Lesion, Skin; Limb Swelling; Myocardial Infarction; Reflux; Shortness of breath; Umbilical hernia; Urinary Stream Slowing         Past Surgical History  Ankle repair; Colonoscopy; EGD; Gallbladder; Repair of right rotator cuff; Tonsillectomy         Medication List  aspirin 81 mg oral tablet,delayed release (DR/EC); atenolol 100 mg oral tablet; Celebrex 200 mg oral capsule; Eliquis 5 mg oral tablet; famotidine 20 mg oral tablet; Flomax 0.4 mg oral capsule; FLOVENT 50 MCG DISKUS inhalation; glyburide 5 mg oral tablet; hydrochlorothiazide 25 mg oral tablet; isosorbide mononitrate 30 mg oral tablet extended release 24 hr; loratadine 10 mg oral tablet; metformin 1,000 mg oral tablet extended release 24hr; nifedipine 90 mg oral tablet extended release; nitroglycerin oral; Protonix 40 mg oral tablet,delayed release (DR/EC); simvastatin 40 mg oral tablet; terazosin 5 mg oral capsule; triamcinolone acetonide 0.1 % topical cream         Allergy List  NO KNOWN DRUG ALLERGIES       Allergies Reconciled  Family Medical History  Heart Disease; Diabetes, unspecified type; - No Family History of Colorectal Cancer; *Non Contributory; Prostate cancer; Family history of Arthritis         Social History  Alcohol Use (Current some day); Denies  illicit substance abuse; lives alone; Recreational Drug Use (Never); Retired.; Tobacco (Former);          Review of Systems  · Cardiovascular  o Denies  o : chest pain on exertion, shortness of breath, lower extremity swelling  · Respiratory  o Denies  o : wheezing, chronic cough, coughing up blood  · Gastrointestinal  o Denies  o : diarrhea, chronic abdominal pain, reflux symptoms      Physical Examination     After 1% Xylocaine injection and Hibiclens prep, an elliptical incision was made. The lesion was excised and sent to pathology. I closed the wound with 2-0 Prolene.           Plan  · Medications  o Medications have been Reconciled  o Transition of Care or Provider Policy  · Instructions  o Electronically Identified Patient Education Materials Provided Electronically     He will follow-up in one week.             Electronically Signed by: Raven Cantrell-, -Author on May 3, 2021 12:06:12 PM  Electronically Co-signed by: Michelet Vergara MD -Reviewer on May 13, 2021 08:40:30 AM

## 2021-05-14 NOTE — PROGRESS NOTES
Progress Note      Patient Name: Toney Neri   Patient ID: [id number]   Sex: Male   YOB: 1935    Primary Care Provider: Alverto Lopez MD   Referring Provider: Alverto Lopez MD    Visit Date: April 21, 2021    Provider: NINO Lobo   Location: [facility]   Location Address: [address] [place] [address]  [id number]   Location Phone: [phone]          Chief Complaint  · Follow Up      History Of Present Illness  Toney Neri is a 85 year old /White male who is here to follow up exam.      Patient presents today to follow-up after having an office procedure of an excision of a ruptured EIC on 4/12/2021 performed by Dr. Michelet Vergara.  Pathology did reveal EIC.  Patient denies any fever or chills.       Past Medical History  Disease Name Date Onset Notes   Arthritis --  --    Diabetes --  --    Diabetes Mellitus, Type II --  --    Elevated PSA --  --    High cholesterol --  --    Hypertension --  --    Lesion, Skin --  --    Limb Swelling --  --    Myocardial Infarction --  --    Reflux --  --    Shortness of breath --  --    Umbilical hernia --  --    Urinary Stream Slowing --  --          Past Surgical History  Procedure Name Date Notes   Ankle repair 1989 right   Colonoscopy 2012 --    EGD 2018 2020 --    Gallbladder --  --    Repair of right rotator cuff 1988 & 1990 --    Tonsillectomy --  --          Medication List  Name Date Started Instructions   aspirin 81 mg oral tablet,delayed release (/ASUNCION)  take 1 tablet (81 mg) by oral route once daily   atenolol 100 mg oral tablet  take 1 tablet (100 mg) by oral route once daily   Celebrex 200 mg oral capsule  take 1 capsule (200 mg) by oral route once daily   Eliquis 5 mg oral tablet  take 1 tablet (5 mg) by oral route 2 times per day   famotidine 20 mg oral tablet  take 1 tablet (20 mg) by oral route once daily at bedtime   Flomax 0.4 mg oral capsule 01/20/2021 take 1 capsule by oral route 2 times a  day for 90 days   FLOVENT 50 MCG DISKUS inhalation  --    glyburide 5 mg oral tablet  take 1 tablet (5 mg) by oral route once daily before breakfast   hydrochlorothiazide 25 mg oral tablet  take 1 tablet (25 mg) by oral route once daily   isosorbide mononitrate 30 mg oral tablet extended release 24 hr  take 1 tablet (30 mg) by oral route once daily in the morning   loratadine 10 mg oral tablet  take 1 tablet (10 mg) by oral route once daily   metformin 1,000 mg oral tablet extended release 24hr  take 2 tablets (2,000 mg) by oral route once daily with the evening meal   nifedipine 90 mg oral tablet extended release  take 1 tablet (90 mg) by oral route once daily   nitroglycerin oral  --    Protonix 40 mg oral tablet,delayed release (/EC) 05/02/2018 take 1 tablet (40 mg) by oral route once daily for 30 days   simvastatin 40 mg oral tablet  take 1 tablet (40 mg) by oral route once daily in the evening   terazosin 5 mg oral capsule  take 1 capsule (5 mg) by oral route once daily at bedtime   triamcinolone acetonide 0.1 % topical cream  --          Allergy List  Allergen Name Date Reaction Notes   NO KNOWN DRUG ALLERGIES --  --  --        Allergies Reconciled  Family Medical History  Disease Name Relative/Age Notes   Heart Disease Mother/   Mother   Diabetes, unspecified type  --    - No Family History of Colorectal Cancer  --    *Non Contributory  --    Prostate cancer Father/  Son/   --    Family history of Arthritis Father/  Mother/   Mother; Father         Social History  Finding Status Start/Stop Quantity Notes   Alcohol Use --  --/-- --  04/21/2021 - rarely drinks, less than 1 drink per day, has been drinking for 31 or more years   Denies illicit substance abuse --  --/-- --  --    lives alone --  --/-- --  --    Recreational Drug Use Never --/-- --  no   Retired. --  --/-- --  --    Tobacco Former --/-- --  former smoker, smoked 21-30 years    --  --/-- --  --          Review of  "Systems  · Constitutional  o Denies  o : chills, fever  · Eyes  o Denies  o : yellowish discoloration of eyes  · HENT  o Denies  o : difficulty swallowing  · Cardiovascular  o Denies  o : chest pain on exertion  · Respiratory  o Denies  o : shortness of breath  · Gastrointestinal  o Denies  o : nausea, vomiting, diarrhea, constipation  · Genitourinary  o Denies  o : abnormal color of urine  · Integument  o Denies  o : rash  · Neurologic  o Denies  o : tingling or numbness  · Musculoskeletal  o Denies  o : joint pain  · Endocrine  o Denies  o : weight gain, weight loss      Vitals  Date Time BP Position Site L\R Cuff Size HR RR TEMP (F) WT  HT  BMI kg/m2 BSA m2 O2 Sat FR L/min FiO2        04/21/2021 02:14 PM       14  253lbs 8oz 5'  9.5\" 36.9 2.37             Physical Examination  · Constitutional  o Appearance  o : well developed, well-nourished, patient in no apparent distress  · Head and Face  o Head  o :   § Inspection  § : atraumatic, normocephalic  o Face  o :   § Inspection  § : no facial lesions  · Eyes  o Conjunctivae  o : conjunctivae normal  o Sclerae  o : sclerae white  · Neck  o Inspection/Palpation  o : normal appearance, no masses or tenderness, trachea midline  · Respiratory  o Respiratory Effort  o : breathing unlabored  · Skin and Subcutaneous Tissue  o General Inspection  o : no lesions present, no areas of discoloration, skin turgor normal, texture normal  · Neurologic  o Mental Status Examination  o :   § Orientation  § : grossly oriented to person, place and time  § Attention  § : attention normal, concentration abilities normal  § Fund of Knowledge  § : fund of knowledge within normal limits, patient aware of current events  o Gait and Station  o : normal gait, able to stand without difficulty  · Psychiatric  o Judgement and Insight  o : judgment and insight intact  o Mood and Affect  o : mood normal, affect appropriate     left lateral upper back: Surgical incisions clean dry and intact with " sutures present.  No erythema noted.  I removed all sutures today in office.               Assessment  · Postoperative Exam Following Surgery     V67.00/Z09  · Epidermal cyst     706.2/L72.0    Problems Reconciled  Plan  · Medications  o Medications have been Reconciled  o Transition of Care or Provider Policy  · Instructions  o Follow up PRN.  o Electronically Identified Patient Education Materials Provided Electronically  · Disposition  o EMR dragon/transcription disclaimer: Much of this encounter note is an electronic transcription/translation of spoken language to printed text. Electronic translation of spoken language may permit erroneous, or at times nonsensical words or phrases to be inadvertently trasncribed; although I have reviewed the note for such errors, some may still exist.            Electronically Signed by: NINO Lobo -Author on April 21, 2021 05:41:59 PM

## 2021-05-14 NOTE — PROGRESS NOTES
"   Progress Note      Patient Name: Toney Neri   Patient ID: [id number]   Sex: Male   YOB: 1935    Primary Care Provider: Alverto Lopez MD   Referring Provider: Alverto Lopez MD    Visit Date: March 16, 2021    Provider: Jacobo Sweet MD   Location: [facility]   Location Address: [address] [place] [address], [address]   [place]  [id number]   Location Phone: [phone]          Chief Complaint     Atrial fibrillation.       History Of Present Illness  Toney Neri is a 85 year old /White male with history of permanent atrial fibrillation. No chest pain or shortness of breath.   CURRENT MEDICATIONS: Medication list was reviewed and is as documented.   PAST MEDICAL HISTORY: Coronary artery disease; diabetes mellitus; essential hypertension; PVCs; palpitations; persistent atrial fibrillation.   PSYCHOSOCIAL HISTORY: Rarely uses alcohol. Previous use of tobacco, but quit.      ALLERGIES: No known drug allergies.       Review of Systems  · Cardiovascular  o Admits  o : palpitations (fast, fluttering, or skipping beats), swelling (feet, ankles, hands)  o Denies  o : shortness of breath while walking or lying flat, chest pain or angina pectoris   · Respiratory  o Denies  o : chronic or frequent cough      Vitals  Date Time BP Position Site L\R Cuff Size HR RR TEMP (F) WT  HT  BMI kg/m2 BSA m2 O2 Sat FR L/min FiO2 HC       03/16/2021 12:38 /70 Sitting    86 - R   249lbs 0oz 5'  9\" 36.77 2.34             Physical Examination  · Constitutional  o Appearance  o : Awake, alert, cooperative, pleasant.  · Respiratory  o Inspection of Chest  o : No chest wall deformities, moving equal.  o Auscultation of Lungs  o : Good air entry with vesicular breath sounds.  · Cardiovascular  o Heart  o :   § Auscultation of Heart  § : S1 and S2 regular. No S3. No S4.  o Peripheral Vascular System  o :   § Extremities  § : Peripheral pulses were well felt. No edema. No " cyanosis.  · Gastrointestinal  o Abdominal Examination  o : No masses or organomegaly noted.  · EKG  o EKG  o : Performed in the office today.   o Indications  o : Atrial fibrillation.  o Results  o : Atrial fibrillation, controlled heart rate, old inferior infarct, old anteroseptal infarct.   o Comparison  o : No change from previous EKG.          Assessment     ASSESSMENT & PLAN:    1.  Permanent atrial fibrillation with a controlled heart rate.  Continue Eliquis for prevention.  Continue atenolol        for rate control.   2.  See me back in 6 months.     Jacobo Sweet MD, FACC  VITALIY/rt                Electronically Signed by: Prema Naylor-, Other -Author on March 26, 2021 10:11:19 AM  Electronically Co-signed by: Jacobo Sweet MD -Reviewer on March 29, 2021 09:36:59 AM

## 2021-05-15 VITALS — HEIGHT: 69 IN | RESPIRATION RATE: 14 BRPM | WEIGHT: 254.12 LBS | BODY MASS INDEX: 37.64 KG/M2

## 2021-05-15 VITALS — BODY MASS INDEX: 36.88 KG/M2 | WEIGHT: 249 LBS | HEIGHT: 69 IN | RESPIRATION RATE: 17 BRPM

## 2021-05-15 VITALS
BODY MASS INDEX: 37.18 KG/M2 | HEART RATE: 80 BPM | DIASTOLIC BLOOD PRESSURE: 60 MMHG | WEIGHT: 251 LBS | SYSTOLIC BLOOD PRESSURE: 140 MMHG | HEIGHT: 69 IN

## 2021-05-15 VITALS
BODY MASS INDEX: 36.88 KG/M2 | WEIGHT: 249 LBS | SYSTOLIC BLOOD PRESSURE: 142 MMHG | HEIGHT: 69 IN | DIASTOLIC BLOOD PRESSURE: 60 MMHG

## 2021-05-15 VITALS
BODY MASS INDEX: 37.47 KG/M2 | OXYGEN SATURATION: 97 % | HEART RATE: 66 BPM | HEIGHT: 69 IN | SYSTOLIC BLOOD PRESSURE: 129 MMHG | WEIGHT: 253 LBS | DIASTOLIC BLOOD PRESSURE: 59 MMHG

## 2021-05-15 VITALS — WEIGHT: 265 LBS | HEIGHT: 69 IN | BODY MASS INDEX: 39.25 KG/M2 | RESPIRATION RATE: 18 BRPM

## 2021-05-15 VITALS — BODY MASS INDEX: 36.88 KG/M2 | RESPIRATION RATE: 16 BRPM | WEIGHT: 249 LBS | HEIGHT: 69 IN

## 2021-05-15 VITALS
HEART RATE: 98 BPM | WEIGHT: 245 LBS | DIASTOLIC BLOOD PRESSURE: 74 MMHG | SYSTOLIC BLOOD PRESSURE: 130 MMHG | BODY MASS INDEX: 36.29 KG/M2 | HEIGHT: 69 IN

## 2021-05-15 VITALS
SYSTOLIC BLOOD PRESSURE: 142 MMHG | DIASTOLIC BLOOD PRESSURE: 62 MMHG | WEIGHT: 250 LBS | HEIGHT: 69 IN | HEART RATE: 56 BPM | BODY MASS INDEX: 37.03 KG/M2

## 2021-05-15 VITALS
BODY MASS INDEX: 37.33 KG/M2 | DIASTOLIC BLOOD PRESSURE: 62 MMHG | WEIGHT: 252 LBS | HEART RATE: 64 BPM | HEIGHT: 69 IN | SYSTOLIC BLOOD PRESSURE: 138 MMHG

## 2021-05-15 VITALS — HEART RATE: 63 BPM | HEIGHT: 69 IN | WEIGHT: 255 LBS | OXYGEN SATURATION: 98 % | BODY MASS INDEX: 37.77 KG/M2

## 2021-05-16 VITALS
OXYGEN SATURATION: 89 % | HEIGHT: 69 IN | DIASTOLIC BLOOD PRESSURE: 58 MMHG | BODY MASS INDEX: 39.29 KG/M2 | SYSTOLIC BLOOD PRESSURE: 121 MMHG | HEART RATE: 62 BPM | WEIGHT: 265.25 LBS | RESPIRATION RATE: 12 BRPM

## 2021-05-16 VITALS — BODY MASS INDEX: 39.25 KG/M2 | RESPIRATION RATE: 16 BRPM | HEIGHT: 69 IN | WEIGHT: 265 LBS

## 2021-05-16 VITALS — RESPIRATION RATE: 16 BRPM | HEIGHT: 69 IN | WEIGHT: 268 LBS | BODY MASS INDEX: 39.69 KG/M2

## 2021-05-16 VITALS
BODY MASS INDEX: 38.89 KG/M2 | WEIGHT: 262.56 LBS | DIASTOLIC BLOOD PRESSURE: 61 MMHG | HEIGHT: 69 IN | HEART RATE: 57 BPM | SYSTOLIC BLOOD PRESSURE: 133 MMHG | RESPIRATION RATE: 12 BRPM

## 2021-05-16 VITALS
BODY MASS INDEX: 38.66 KG/M2 | HEART RATE: 64 BPM | SYSTOLIC BLOOD PRESSURE: 120 MMHG | WEIGHT: 261 LBS | DIASTOLIC BLOOD PRESSURE: 76 MMHG | HEIGHT: 69 IN

## 2021-05-28 NOTE — PROCEDURES
Patient: AMITA ELLSWORTH     Acct: [id number]     Report: #[id number]  MR #:  [id number]     DOS: 2021 1036     : 1935  DICTATING: Yoli Le  ***Signed***  --------------------------------------------------------------------------------------------------------------------  Urology Post Procedure/Op Note      Date       21            Pre-Operative Diagnosis:      Bladder cancer            Post-Operative Diagnosis:      Same as pre-op diagnosis            Surgeon/Assistants      Surgeon:  Yoli Le            Anesthesia      MAC            Procedure Performed/Technique      TURBT medium            Specimen/Tissue Removed:            Bladder tumor            Findings:      None            Complications:      No            Estimated Blood Loss:      None            Procedure:      After proper consent was obtained, patient was taken to operating room and     general anesthesia was performed.  The patient was placed in the dorsal lithoto    my position and prepped and draped in the normal sterile fashion for cystoscopy.                 A 22 [employer] Lucian rigid cystoscope was inserted into the bladder.  The bladder was     inspected in a systemic meridian fashion using a 30 degree lens.  There was a     papillary bladder tumor on the dome and another at the bladder neck.  The tumor     was papillary in nature.  A gyrus resectoscope was inserted into the bladder     with a medium loop.  The loop was then used to resect the tumor in its entirety.     There was no perforation of the bladder noted.  The area of resection was then     fulgurated to stop any bleeding.  Good muscle was sent with the specimen.  The     entire area of resection was approximately 3 cm in size.  Again, no peroration     of the bladder was noted.              The pieces of bladder tumor were then removed from the bladder and sent to     pathology labeled as bladder tumor.  There was no remaining bleeding.              The  patient tolerated the procedure well and was transferred to the PACU in     stable condition.            Yoli Le                 Apr 29, 2021 10:36      Electronically signed by Yoli Le  04/29/2021 10:37     Disclaimer: Converted hospital document may not contain table formatting or lab diagrams. Please see [employer] for authenticated document.

## 2021-06-05 NOTE — PROGRESS NOTES
Progress Note      Patient Name: Toney Neri   Patient ID: [id number]   Sex: Male   YOB: 1935    Primary Care Provider: Alverto Lopez MD   Referring Provider: Alverto Lopez MD    Visit Date: May 5, 2021    Provider: Yoli Le MD   Location: [facility]   Location Address: [address] [place] [address]  [id number]   Location Phone: [phone]          Chief Complaint  · pt is here for urological concerns      History Of Present Illness  The patient presents for follow-up of superficial bladder cancer.   The patient was last seen three months ago . The patient is scheduled for repeat cysto and u/a . Cystoscopy on the last visit showed low grade superficial UC.   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   Imaging:  No imaging studies have been done.       Past Medical History  Arthritis; Diabetes; Diabetes Mellitus, Type II; Elevated PSA; High cholesterol; Hypertension; Lesion, Skin; Limb Swelling; Myocardial Infarction; Reflux; Shortness of breath; Umbilical hernia; Urinary Stream Slowing; Urothelial carcinoma of bladder         Past Surgical History  Ankle repair; Colonoscopy; EGD; Gallbladder; Repair of right rotator cuff; Tonsillectomy         Medication List  aspirin 81 mg oral tablet,delayed release (DR/EC); atenolol 100 mg oral tablet; Celebrex 200 mg oral capsule; Eliquis 5 mg oral tablet; famotidine 20 mg oral tablet; Flomax 0.4 mg oral capsule; FLOVENT 50 MCG DISKUS inhalation; glyburide 5 mg oral tablet; hydrochlorothiazide 25 mg oral tablet; isosorbide mononitrate 30 mg oral tablet extended release 24 hr; loratadine 10 mg oral tablet; metformin 1,000 mg oral tablet extended release 24hr;  "nifedipine 90 mg oral tablet extended release; nitroglycerin oral; Protonix 40 mg oral tablet,delayed release (DR/EC); simvastatin 40 mg oral tablet; terazosin 5 mg oral capsule; triamcinolone acetonide 0.1 % topical cream         Allergy List  NO KNOWN DRUG ALLERGIES       Allergies Reconciled  Family Medical History  Heart Disease; Diabetes, unspecified type; - No Family History of Colorectal Cancer; *Non Contributory; Prostate cancer; Family history of Arthritis         Social History  Alcohol Use; Denies illicit substance abuse; lives alone; Recreational Drug Use (Never); Retired.; Tobacco (Former);          Review of Systems  · Constitutional  o Denies  o : fatigue, night sweats  · Gastrointestinal  o Denies  o : nausea, vomiting      Vitals  Date Time BP Position Site L\R Cuff Size HR RR TEMP (F) WT  HT  BMI kg/m2 BSA m2 O2 Sat FR L/min FiO2 HC       05/05/2021 11:44 /64 Sitting       252lbs 16oz 5'  9.5\" 36.83 2.37             Physical Examination  · Constitutional  o Appearance  o : well-nourished, well developed, alert, in no acute distress          Results  · In-Office Procedures  o Lab procedure  § Automated dipstick urinalysis with microscopy (38117)   § Color Ur: Yellow   § Clarity Ur: Clear   § Glucose Ur Ql Strip: Negative   § Bilirub Ur Ql Strip: Negative   § Ketones Ur Ql Strip: Negative   § Sp Gr Ur Qn: 1.015   § Hgb Ur Ql Strip: Small   § pH Ur-LsCnc: 7.0   § Prot Ur Ql Strip: Negative   § Urobilinogen Ur Strip-mCnc: 0.2   § Nitrite Ur Ql Strip: Negative   § WBC Est Ur Ql Strip: Trace   § RBC UrnS Qn HPF: 5   § WBC UrnS Qn HPF: 0   § Bacteria UrnS Qn HPF: 0   § Crystals UrnS Qn HPF: 0   § Epithelial Cells (non renal): 0 /HPF  § Epithelial Cells (renal): 0       Assessment  · Elevated PSA     790.93/R97.20  · Hematuria     599.70/R31.9  · Bladder cancer     188.9/C67.9      Plan  · Medications  o finasteride 5 mg oral tablet   SIG: take 1 tablet (5 mg) by oral route once daily for 90 " days   DISP: (90) Tablet with 3 refills  Prescribed on 05/05/2021     o Medications have been Reconciled  o Transition of Care or Provider Policy  · Instructions  o Make a 3 month follow-up for an in office cystoscopy for continued screening.  o Continue Flomax. I sent in a script for finasteride today.   o Electronically Identified Patient Education Materials Provided Electronically            Electronically Signed by: Yoli Le MD -Author on May 5, 2021 12:42:16 PM

## 2021-07-15 VITALS
BODY MASS INDEX: 37.47 KG/M2 | WEIGHT: 253 LBS | SYSTOLIC BLOOD PRESSURE: 120 MMHG | DIASTOLIC BLOOD PRESSURE: 64 MMHG | HEIGHT: 69 IN

## 2021-07-19 ENCOUNTER — OFFICE VISIT (OUTPATIENT)
Dept: FAMILY MEDICINE CLINIC | Facility: CLINIC | Age: 86
End: 2021-07-19

## 2021-07-19 VITALS
TEMPERATURE: 97.5 F | SYSTOLIC BLOOD PRESSURE: 116 MMHG | WEIGHT: 253.4 LBS | DIASTOLIC BLOOD PRESSURE: 70 MMHG | HEART RATE: 83 BPM | BODY MASS INDEX: 37.42 KG/M2 | OXYGEN SATURATION: 96 %

## 2021-07-19 DIAGNOSIS — M54.2 NECK PAIN: Primary | ICD-10-CM

## 2021-07-19 DIAGNOSIS — R42 DIZZINESS: ICD-10-CM

## 2021-07-19 LAB
ALBUMIN SERPL-MCNC: 4.2 G/DL (ref 3.5–5.2)
ALBUMIN/GLOB SERPL: 1.6 G/DL
ALP SERPL-CCNC: 45 U/L (ref 39–117)
ALT SERPL W P-5'-P-CCNC: 17 U/L (ref 1–41)
ANION GAP SERPL CALCULATED.3IONS-SCNC: 13.2 MMOL/L (ref 5–15)
AST SERPL-CCNC: 20 U/L (ref 1–40)
BASOPHILS # BLD AUTO: 0.06 10*3/MM3 (ref 0–0.2)
BASOPHILS NFR BLD AUTO: 0.5 % (ref 0–1.5)
BILIRUB SERPL-MCNC: 0.5 MG/DL (ref 0–1.2)
BUN SERPL-MCNC: 16 MG/DL (ref 8–23)
BUN/CREAT SERPL: 11.9 (ref 7–25)
CALCIUM SPEC-SCNC: 9.4 MG/DL (ref 8.6–10.5)
CHLORIDE SERPL-SCNC: 100 MMOL/L (ref 98–107)
CO2 SERPL-SCNC: 25.8 MMOL/L (ref 22–29)
CREAT SERPL-MCNC: 1.34 MG/DL (ref 0.76–1.27)
DEPRECATED RDW RBC AUTO: 44.5 FL (ref 37–54)
EOSINOPHIL # BLD AUTO: 0.18 10*3/MM3 (ref 0–0.4)
EOSINOPHIL NFR BLD AUTO: 1.5 % (ref 0.3–6.2)
ERYTHROCYTE [DISTWIDTH] IN BLOOD BY AUTOMATED COUNT: 12.5 % (ref 12.3–15.4)
FOLATE SERPL-MCNC: >20 NG/ML (ref 4.78–24.2)
GFR SERPL CREATININE-BSD FRML MDRD: 51 ML/MIN/1.73
GLOBULIN UR ELPH-MCNC: 2.6 GM/DL
GLUCOSE SERPL-MCNC: 180 MG/DL (ref 65–99)
HCT VFR BLD AUTO: 41.2 % (ref 37.5–51)
HGB BLD-MCNC: 13.9 G/DL (ref 13–17.7)
IMM GRANULOCYTES # BLD AUTO: 0.04 10*3/MM3 (ref 0–0.05)
IMM GRANULOCYTES NFR BLD AUTO: 0.3 % (ref 0–0.5)
LYMPHOCYTES # BLD AUTO: 2.93 10*3/MM3 (ref 0.7–3.1)
LYMPHOCYTES NFR BLD AUTO: 25.1 % (ref 19.6–45.3)
MAGNESIUM SERPL-MCNC: 1.6 MG/DL (ref 1.6–2.4)
MCH RBC QN AUTO: 32.8 PG (ref 26.6–33)
MCHC RBC AUTO-ENTMCNC: 33.7 G/DL (ref 31.5–35.7)
MCV RBC AUTO: 97.2 FL (ref 79–97)
MONOCYTES # BLD AUTO: 0.91 10*3/MM3 (ref 0.1–0.9)
MONOCYTES NFR BLD AUTO: 7.8 % (ref 5–12)
NEUTROPHILS NFR BLD AUTO: 64.8 % (ref 42.7–76)
NEUTROPHILS NFR BLD AUTO: 7.55 10*3/MM3 (ref 1.7–7)
NRBC BLD AUTO-RTO: 0 /100 WBC (ref 0–0.2)
PLATELET # BLD AUTO: 186 10*3/MM3 (ref 140–450)
PMV BLD AUTO: 12.2 FL (ref 6–12)
POTASSIUM SERPL-SCNC: 3.5 MMOL/L (ref 3.5–5.2)
PROT SERPL-MCNC: 6.8 G/DL (ref 6–8.5)
RBC # BLD AUTO: 4.24 10*6/MM3 (ref 4.14–5.8)
SODIUM SERPL-SCNC: 139 MMOL/L (ref 136–145)
VIT B12 BLD-MCNC: 293 PG/ML (ref 211–946)
WBC # BLD AUTO: 11.67 10*3/MM3 (ref 3.4–10.8)

## 2021-07-19 PROCEDURE — 82607 VITAMIN B-12: CPT | Performed by: FAMILY MEDICINE

## 2021-07-19 PROCEDURE — 80053 COMPREHEN METABOLIC PANEL: CPT | Performed by: FAMILY MEDICINE

## 2021-07-19 PROCEDURE — 82746 ASSAY OF FOLIC ACID SERUM: CPT | Performed by: FAMILY MEDICINE

## 2021-07-19 PROCEDURE — 36415 COLL VENOUS BLD VENIPUNCTURE: CPT | Performed by: FAMILY MEDICINE

## 2021-07-19 PROCEDURE — 99214 OFFICE O/P EST MOD 30 MIN: CPT | Performed by: FAMILY MEDICINE

## 2021-07-19 PROCEDURE — 85025 COMPLETE CBC W/AUTO DIFF WBC: CPT | Performed by: FAMILY MEDICINE

## 2021-07-19 PROCEDURE — 83735 ASSAY OF MAGNESIUM: CPT | Performed by: FAMILY MEDICINE

## 2021-07-19 RX ORDER — TERAZOSIN 10 MG/1
10 CAPSULE ORAL NIGHTLY
COMMUNITY
End: 2022-11-07

## 2021-07-19 RX ORDER — LANOLIN ALCOHOL/MO/W.PET/CERES
6 CREAM (GRAM) TOPICAL
COMMUNITY
Start: 2021-01-30

## 2021-07-19 RX ORDER — ALBUTEROL SULFATE 90 UG/1
1 AEROSOL, METERED RESPIRATORY (INHALATION) DAILY PRN
COMMUNITY
Start: 2021-01-30

## 2021-07-19 RX ORDER — LANOLIN ALCOHOL/MO/W.PET/CERES
400 CREAM (GRAM) TOPICAL DAILY
COMMUNITY
Start: 2021-04-21

## 2021-07-19 RX ORDER — APIXABAN 5 MG/1
5 TABLET, FILM COATED ORAL 2 TIMES DAILY
COMMUNITY
Start: 2021-01-30

## 2021-07-19 RX ORDER — PANTOPRAZOLE SODIUM 40 MG/1
40 TABLET, DELAYED RELEASE ORAL AS NEEDED
Status: ON HOLD | COMMUNITY
Start: 2021-04-21 | End: 2023-03-21

## 2021-07-19 RX ORDER — TAMSULOSIN HYDROCHLORIDE 0.4 MG/1
1 CAPSULE ORAL 2 TIMES DAILY
COMMUNITY

## 2021-07-19 RX ORDER — NIFEDIPINE 90 MG/1
90 TABLET, EXTENDED RELEASE ORAL EVERY MORNING
COMMUNITY

## 2021-07-19 RX ORDER — POTASSIUM CHLORIDE 750 MG/1
10 TABLET, FILM COATED, EXTENDED RELEASE ORAL DAILY
COMMUNITY
Start: 2021-01-30

## 2021-07-19 RX ORDER — SIMVASTATIN 40 MG
40 TABLET ORAL NIGHTLY
COMMUNITY

## 2021-07-19 RX ORDER — ASPIRIN 81 MG/1
81 TABLET, COATED ORAL DAILY
COMMUNITY
Start: 2021-04-21

## 2021-07-19 RX ORDER — ATENOLOL 50 MG/1
50 TABLET ORAL EVERY MORNING
COMMUNITY

## 2021-07-19 RX ORDER — LORATADINE 10 MG/1
10 CAPSULE, LIQUID FILLED ORAL DAILY
COMMUNITY
End: 2022-03-21 | Stop reason: SDUPTHER

## 2021-07-19 RX ORDER — CARBOXYMETHYLCELLULOSE SODIUM 5 MG/ML
1 SOLUTION/ DROPS OPHTHALMIC 4 TIMES DAILY
COMMUNITY

## 2021-07-19 RX ORDER — FLUTICASONE PROPIONATE 50 MCG
1 SPRAY, SUSPENSION (ML) NASAL EVERY MORNING
COMMUNITY
Start: 2021-01-30

## 2021-07-19 RX ORDER — HYDROCHLOROTHIAZIDE 25 MG/1
25 TABLET ORAL EVERY MORNING
COMMUNITY
End: 2022-05-18 | Stop reason: SDUPTHER

## 2021-07-19 RX ORDER — ACETAMINOPHEN 325 MG/1
325 TABLET ORAL DAILY
COMMUNITY
Start: 2021-04-21

## 2021-07-19 RX ORDER — TIZANIDINE 2 MG/1
2 TABLET ORAL EVERY 8 HOURS PRN
Qty: 270 TABLET | Refills: 0 | Status: ON HOLD | OUTPATIENT
Start: 2021-07-19 | End: 2022-04-26

## 2021-07-19 NOTE — PROGRESS NOTES
Venipuncture Blood Specimen Collection  Venipuncture performed in LEFT ARM  by Orin Londono with good hemostasis. Patient tolerated the procedure well without complications.   07/19/21   Orin Londono

## 2021-07-19 NOTE — PROGRESS NOTES
Chief Complaint  Neck Pain (started about 3-4 weeks ago, gets worse at night) and Dizziness (for several months)    Subjective          Toney Neri presents to [facility]  Pt has seen cardiology for dizziness and they have cleared pt    Neck Pain   This is a new problem. The current episode started 1 to 4 weeks ago. The problem occurs constantly. The problem has been gradually worsening. The pain is associated with nothing. Pain location: right posterior neck. The quality of the pain is described as aching. Nothing aggravates the symptoms. Worse during: pain is worse when moving neck. Stiffness is present in the morning. Pertinent negatives include no chest pain, fever, headaches, leg pain, numbness, pain with swallowing, paresis, photophobia, syncope, tingling, trouble swallowing, visual change, weakness or weight loss. Treatments tried: otc cream. The treatment provided mild relief.   Dizziness  This is a chronic problem. The current episode started more than 1 year ago. The problem occurs intermittently. The problem has been unchanged. Associated symptoms include neck pain. Pertinent negatives include no abdominal pain, anorexia, arthralgias, change in bowel habit, chest pain, chills, congestion, coughing, diaphoresis, fatigue, fever, headaches, joint swelling, myalgias, nausea, numbness, rash, sore throat, swollen glands, urinary symptoms, vertigo, visual change, vomiting or weakness. Exacerbated by: standing. He has tried nothing for the symptoms.       Objective   No Known Allergies    There is no immunization history on file for this patient.    Vital Signs:   Vitals:    07/19/21 1133   BP: 116/70   BP Location: Left arm   Pulse: 83   Temp: 97.5 °F (36.4 °C)   SpO2: 96%   Weight: 115 kg (253 lb 6.4 oz)       Physical Exam  Vitals reviewed.   Constitutional:       Appearance: Normal appearance. He is well-developed.   HENT:      Head: Normocephalic and atraumatic.       Right Ear: Tympanic membrane, ear canal and external ear normal.      Left Ear: Tympanic membrane, ear canal and external ear normal.      Mouth/Throat:      Mouth: Mucous membranes are moist.      Pharynx: Oropharynx is clear. No oropharyngeal exudate.   Eyes:      Conjunctiva/sclera: Conjunctivae normal.      Pupils: Pupils are equal, round, and reactive to light.   Neck:      Thyroid: No thyroid mass, thyromegaly or thyroid tenderness.      Vascular: No carotid bruit.   Cardiovascular:      Rate and Rhythm: Normal rate and regular rhythm.      Pulses: Normal pulses.      Heart sounds: Normal heart sounds. No murmur heard.   No friction rub. No gallop.    Pulmonary:      Effort: Pulmonary effort is normal.      Breath sounds: Normal breath sounds. No wheezing or rhonchi.   Abdominal:      General: Bowel sounds are normal. There is no distension.      Palpations: Abdomen is soft.      Tenderness: There is no abdominal tenderness.   Musculoskeletal:      Comments: Right neck paraspinal muscle tenderness, normal ROM, stable, no redness, warmth, swelling, or bruising.   Lymphadenopathy:      Cervical: No cervical adenopathy.   Skin:     General: Skin is warm and dry.   Neurological:      Mental Status: He is alert and oriented to person, place, and time.      Cranial Nerves: No cranial nerve deficit.   Psychiatric:         Mood and Affect: Mood and affect normal.         Behavior: Behavior normal.         Thought Content: Thought content normal.         Judgment: Judgment normal.        Result Review :   The following data was reviewed by: Alverto Lopez MD on 07/19/2021:    Data reviewed: Radiologic studies I viewed and interpretted 3 views x-rays cervical spine- no fxs.        Pt has degenerative disease of neck seen on x-rays.  Assessment and Plan    Diagnoses and all orders for this visit:    1. Neck pain (Primary)  -     XR Spine Cervical Complete 4 or 5 View (In Office)  -     Cancel: Ambulatory Referral to  Physical Therapy  -     Ambulatory Referral to Home Health    2. Dizziness  -     MRI Brain With & Without Contrast; Future  -     Duplex Carotid Ultrasound CAR; Future  -     Vitamin B12 & Folate  -     Magnesium  -     Comprehensive Metabolic Panel  -     CBC Auto Differential    Other orders  -     tiZANidine (ZANAFLEX) 2 MG tablet; Take 1 tablet by mouth Every 8 (Eight) Hours As Needed for Muscle Spasms.  Dispense: 270 tablet; Refill: 0            Follow Up   Return in about 3 weeks (around 8/9/2021) for Recheck.  Patient was given instructions and counseling regarding his condition or for health maintenance advice. Please see specific information pulled into the AVS if appropriate.

## 2021-07-20 ENCOUNTER — TELEPHONE (OUTPATIENT)
Dept: UROLOGY | Facility: CLINIC | Age: 86
End: 2021-07-20

## 2021-07-20 DIAGNOSIS — N40.1 BPH WITH URINARY OBSTRUCTION: Primary | ICD-10-CM

## 2021-07-20 DIAGNOSIS — N13.8 BPH WITH URINARY OBSTRUCTION: Primary | ICD-10-CM

## 2021-07-20 RX ORDER — FINASTERIDE 5 MG/1
5 TABLET, FILM COATED ORAL DAILY
Qty: 90 TABLET | Refills: 3 | Status: SHIPPED | OUTPATIENT
Start: 2021-07-20 | End: 2022-07-15

## 2021-08-04 ENCOUNTER — HOSPITAL ENCOUNTER (OUTPATIENT)
Dept: MRI IMAGING | Facility: HOSPITAL | Age: 86
Discharge: HOME | End: 2021-08-04

## 2021-08-04 ENCOUNTER — HOSPITAL ENCOUNTER (OUTPATIENT)
Dept: CARDIOLOGY | Facility: HOSPITAL | Age: 86
Discharge: HOME | End: 2021-08-04

## 2021-08-04 ENCOUNTER — HOME HEALTH ADMISSION (OUTPATIENT)
Dept: HOME HEALTH SERVICES | Facility: HOME HEALTHCARE | Age: 86
End: 2021-08-04

## 2021-08-04 DIAGNOSIS — R42 DIZZINESS: ICD-10-CM

## 2021-08-04 LAB
BH CV XLRA MEAS LEFT CAROTID BULB EDV: 12.1 CM/SEC
BH CV XLRA MEAS LEFT CAROTID BULB PSV: 56.5 CM/SEC
BH CV XLRA MEAS LEFT DIST CCA EDV: 21.4 CM/SEC
BH CV XLRA MEAS LEFT DIST CCA PSV: 71.9 CM/SEC
BH CV XLRA MEAS LEFT DIST ICA EDV: 15.4 CM/SEC
BH CV XLRA MEAS LEFT DIST ICA PSV: 58.2 CM/SEC
BH CV XLRA MEAS LEFT MID ICA EDV: 23 CM/SEC
BH CV XLRA MEAS LEFT MID ICA PSV: 74.6 CM/SEC
BH CV XLRA MEAS LEFT PROX CCA EDV: 22.5 CM/SEC
BH CV XLRA MEAS LEFT PROX CCA PSV: 81.2 CM/SEC
BH CV XLRA MEAS LEFT PROX ECA EDV: 21.7 CM/SEC
BH CV XLRA MEAS LEFT PROX ECA PSV: 115 CM/SEC
BH CV XLRA MEAS LEFT PROX ICA EDV: 17 CM/SEC
BH CV XLRA MEAS LEFT PROX ICA PSV: 52.1 CM/SEC
BH CV XLRA MEAS LEFT VERTEBRAL A EDV: 15.5 CM/SEC
BH CV XLRA MEAS LEFT VERTEBRAL A PSV: 44.1 CM/SEC
BH CV XLRA MEAS RIGHT CAROTID BULB EDV: 17.4 CM/SEC
BH CV XLRA MEAS RIGHT CAROTID BULB PSV: 66.5 CM/SEC
BH CV XLRA MEAS RIGHT DIST CCA EDV: 20.5 CM/SEC
BH CV XLRA MEAS RIGHT DIST CCA PSV: 69 CM/SEC
BH CV XLRA MEAS RIGHT DIST ICA EDV: 19.2 CM/SEC
BH CV XLRA MEAS RIGHT DIST ICA PSV: 53.2 CM/SEC
BH CV XLRA MEAS RIGHT MID ICA EDV: 13.7 CM/SEC
BH CV XLRA MEAS RIGHT MID ICA PSV: 47.7 CM/SEC
BH CV XLRA MEAS RIGHT PROX CCA EDV: 16.2 CM/SEC
BH CV XLRA MEAS RIGHT PROX CCA PSV: 70.8 CM/SEC
BH CV XLRA MEAS RIGHT PROX ECA EDV: 15.4 CM/SEC
BH CV XLRA MEAS RIGHT PROX ECA PSV: 82.9 CM/SEC
BH CV XLRA MEAS RIGHT PROX ICA EDV: 12.1 CM/SEC
BH CV XLRA MEAS RIGHT PROX ICA PSV: 49.4 CM/SEC
BH CV XLRA MEAS RIGHT VERTEBRAL A EDV: 12.1 CM/SEC
BH CV XLRA MEAS RIGHT VERTEBRAL A PSV: 32.4 CM/SEC
LEFT ARM BP: NORMAL MMHG
MAXIMAL PREDICTED HEART RATE: 134 BPM
RIGHT ARM BP: NORMAL MMHG
STRESS TARGET HR: 114 BPM

## 2021-08-04 PROCEDURE — 93880 EXTRACRANIAL BILAT STUDY: CPT

## 2021-08-04 PROCEDURE — A9577 INJ MULTIHANCE: HCPCS | Performed by: FAMILY MEDICINE

## 2021-08-04 PROCEDURE — 93880 EXTRACRANIAL BILAT STUDY: CPT | Performed by: SURGERY

## 2021-08-04 PROCEDURE — 0 GADOBENATE DIMEGLUMINE 529 MG/ML SOLUTION: Performed by: FAMILY MEDICINE

## 2021-08-04 PROCEDURE — 70553 MRI BRAIN STEM W/O & W/DYE: CPT

## 2021-08-04 RX ADMIN — GADOBENATE DIMEGLUMINE 20 ML: 529 INJECTION, SOLUTION INTRAVENOUS at 12:45

## 2021-08-05 ENCOUNTER — TELEPHONE (OUTPATIENT)
Dept: FAMILY MEDICINE CLINIC | Facility: CLINIC | Age: 86
End: 2021-08-05

## 2021-08-05 NOTE — TELEPHONE ENCOUNTER
Called patient to release results and while on the phone he asked me about his home health physical therapy referral. He said he heard from someone the other day and they were supposed to call back but no one ever did. I tried to find a note in the chart to try to help but couldn't.

## 2021-08-07 ENCOUNTER — TRANSCRIBE ORDERS (OUTPATIENT)
Dept: HOME HEALTH SERVICES | Facility: HOME HEALTHCARE | Age: 86
End: 2021-08-07

## 2021-08-07 DIAGNOSIS — M50.30 DEGENERATION, INTERVERTEBRAL DISC, CERVICAL: Primary | ICD-10-CM

## 2021-08-09 ENCOUNTER — OFFICE VISIT (OUTPATIENT)
Dept: UROLOGY | Facility: CLINIC | Age: 86
End: 2021-08-09

## 2021-08-09 ENCOUNTER — PREP FOR SURGERY (OUTPATIENT)
Dept: OTHER | Facility: HOSPITAL | Age: 86
End: 2021-08-09

## 2021-08-09 ENCOUNTER — HOME HEALTH ADMISSION (OUTPATIENT)
Dept: HOME HEALTH SERVICES | Facility: HOME HEALTHCARE | Age: 86
End: 2021-08-09

## 2021-08-09 ENCOUNTER — TRANSCRIBE ORDERS (OUTPATIENT)
Dept: HOME HEALTH SERVICES | Facility: HOME HEALTHCARE | Age: 86
End: 2021-08-09

## 2021-08-09 ENCOUNTER — HOME CARE VISIT (OUTPATIENT)
Dept: HOME HEALTH SERVICES | Facility: HOME HEALTHCARE | Age: 86
End: 2021-08-09

## 2021-08-09 VITALS
SYSTOLIC BLOOD PRESSURE: 131 MMHG | HEIGHT: 69 IN | DIASTOLIC BLOOD PRESSURE: 81 MMHG | WEIGHT: 251 LBS | BODY MASS INDEX: 37.18 KG/M2

## 2021-08-09 DIAGNOSIS — M54.2 CERVICALGIA: Primary | ICD-10-CM

## 2021-08-09 DIAGNOSIS — C67.9 MALIGNANT NEOPLASM OF URINARY BLADDER, UNSPECIFIED SITE (HCC): Primary | ICD-10-CM

## 2021-08-09 LAB
BILIRUB BLD-MCNC: NEGATIVE MG/DL
CLARITY, POC: CLEAR
COLOR UR: YELLOW
GLUCOSE UR STRIP-MCNC: NEGATIVE MG/DL
KETONES UR QL: NEGATIVE
LEUKOCYTE EST, POC: NEGATIVE
NITRITE UR-MCNC: NEGATIVE MG/ML
PH UR: 6 [PH] (ref 5–8)
PROT UR STRIP-MCNC: NEGATIVE MG/DL
RBC # UR STRIP: ABNORMAL /UL
SP GR UR: 1.01 (ref 1–1.03)
UROBILINOGEN UR QL: NORMAL

## 2021-08-09 PROCEDURE — 52000 CYSTOURETHROSCOPY: CPT | Performed by: UROLOGY

## 2021-08-09 PROCEDURE — 81003 URINALYSIS AUTO W/O SCOPE: CPT | Performed by: UROLOGY

## 2021-08-09 RX ORDER — NIFEDIPINE 90 MG/1
TABLET, FILM COATED ORAL
COMMUNITY
Start: 2021-07-20 | End: 2022-04-19

## 2021-08-09 RX ORDER — LEVOFLOXACIN 5 MG/ML
500 INJECTION, SOLUTION INTRAVENOUS ONCE
Status: CANCELLED | OUTPATIENT
Start: 2021-08-09 | End: 2021-08-09

## 2021-08-09 RX ORDER — SODIUM CHLORIDE 0.9 % (FLUSH) 0.9 %
10 SYRINGE (ML) INJECTION EVERY 12 HOURS SCHEDULED
Status: CANCELLED | OUTPATIENT
Start: 2021-08-09

## 2021-08-09 RX ORDER — SODIUM CHLORIDE 0.9 % (FLUSH) 0.9 %
10 SYRINGE (ML) INJECTION AS NEEDED
Status: CANCELLED | OUTPATIENT
Start: 2021-08-09

## 2021-08-09 RX ORDER — SODIUM CHLORIDE 9 MG/ML
100 INJECTION, SOLUTION INTRAVENOUS CONTINUOUS
Status: CANCELLED | OUTPATIENT
Start: 2021-08-09

## 2021-08-09 NOTE — H&P
[facility]   Urology HISTORY AND PHYSICAL    Patient Name: Toney Neri  : 1935  MRN: [id number]  Primary Care Physician:  Alverto Lopez MD  Date of admission: (Not on file)    Subjective   Subjective     Chief Complaint: Bladder tumors    Patient has 3 new papillary bladder tumors on an office cystoscopy.  One is near the right UO 1 is near the left UO and was on the dome of the bladder.  He will need to be paralyzed for the procedure.      Personal History     Past Medical History:   Diagnosis Date   • Acid reflux    • Arthritis    • Diabetes (CMS/HCC)    • Diabetes mellitus, type 2 (CMS/HCC)    • Elevated PSA    • High cholesterol    • Hypertension    • Limb swelling    • Myocardial infarction (CMS/HCC)    • Shortness of breath    • Skin lesion    • Umbilical hernia    • Urinary stream slowing    • Urothelial carcinoma of bladder (CMS/HCC) 2021       Past Surgical History:   Procedure Laterality Date   • ANKLE SURGERY Right     ankle repair   • COLONOSCOPY     • ENDOSCOPY  2018   • GALLBLADDER SURGERY     • ROTATOR CUFF REPAIR Right 1988   • TONSILLECTOMY         Family History: family history includes Arthritis in his father and mother; Diabetes in an other family member; Heart disease in his mother; Prostate cancer in his father and son. Otherwise pertinent FHx was reviewed and not pertinent to current issue.    Social History:  reports that he has quit smoking. His smoking use included cigarettes. He has never used smokeless tobacco. He reports current alcohol use. He reports that he does not use drugs.    Home Medications:  Loratadine, Magnesium Oxide, NIFEdipine CC, NIFEdipine XL, acetaminophen, albuterol sulfate HFA, apixaban, aspirin, atenolol, carboxymethylcellulose, finasteride, fluticasone, hydroCHLOROthiazide, melatonin, metFORMIN, pantoprazole, potassium chloride, simvastatin, tamsulosin, terazosin, and tiZANidine    Allergies:  No Known  Allergies    Objective    Objective     Vitals:   BP: (131)/(81) 131/81    Physical Exam  Constitutional:       Appearance: Normal appearance.   Cardiovascular:      Rate and Rhythm: Normal rate and regular rhythm.   Pulmonary:      Effort: Pulmonary effort is normal.      Breath sounds: Normal breath sounds.   Neurological:      Mental Status: He is alert. Mental status is at baseline.   Psychiatric:         Mood and Affect: Mood and affect normal.         Speech: Speech normal.         Judgment: Judgment normal.         Result Review    Result Review:  I have personally reviewed the results from the time of this admission to 8/9/2021 16:51 EDT and agree with these findings:  []  Laboratory  []  Microbiology  []  Radiology  []  EKG/Telemetry   []  Cardiology/Vascular   []  Pathology  []  Old records  []  Other:      Assessment/Plan   Assessment / Plan       Active Hospital Problems:  There are no active hospital problems to display for this patient.      Plan: Transurethral resection of the bladder tumor.    Risks and benefits discussed with patient and they are agreeable to proceed.    DVT prophylaxis:  No DVT prophylaxis order currently exists.    CODE STATUS:           Electronically signed by Yoli Le MD, 08/09/21, 4:51 PM EDT.

## 2021-08-09 NOTE — H&P (VIEW-ONLY)
[facility]   Urology HISTORY AND PHYSICAL    Patient Name: Toney Neri  : 1935  MRN: [id number]  Primary Care Physician:  Alverto Lopez MD  Date of admission: (Not on file)    Subjective   Subjective     Chief Complaint: Bladder tumors    Patient has 3 new papillary bladder tumors on an office cystoscopy.  One is near the right UO 1 is near the left UO and was on the dome of the bladder.  He will need to be paralyzed for the procedure.      Personal History     Past Medical History:   Diagnosis Date   • Acid reflux    • Arthritis    • Diabetes (CMS/HCC)    • Diabetes mellitus, type 2 (CMS/HCC)    • Elevated PSA    • High cholesterol    • Hypertension    • Limb swelling    • Myocardial infarction (CMS/HCC)    • Shortness of breath    • Skin lesion    • Umbilical hernia    • Urinary stream slowing    • Urothelial carcinoma of bladder (CMS/HCC) 2021       Past Surgical History:   Procedure Laterality Date   • ANKLE SURGERY Right     ankle repair   • COLONOSCOPY     • ENDOSCOPY  2018   • GALLBLADDER SURGERY     • ROTATOR CUFF REPAIR Right 1988   • TONSILLECTOMY         Family History: family history includes Arthritis in his father and mother; Diabetes in an other family member; Heart disease in his mother; Prostate cancer in his father and son. Otherwise pertinent FHx was reviewed and not pertinent to current issue.    Social History:  reports that he has quit smoking. His smoking use included cigarettes. He has never used smokeless tobacco. He reports current alcohol use. He reports that he does not use drugs.    Home Medications:  Loratadine, Magnesium Oxide, NIFEdipine CC, NIFEdipine XL, acetaminophen, albuterol sulfate HFA, apixaban, aspirin, atenolol, carboxymethylcellulose, finasteride, fluticasone, hydroCHLOROthiazide, melatonin, metFORMIN, pantoprazole, potassium chloride, simvastatin, tamsulosin, terazosin, and tiZANidine    Allergies:  No Known  Allergies    Objective    Objective     Vitals:   BP: (131)/(81) 131/81    Physical Exam  Constitutional:       Appearance: Normal appearance.   Cardiovascular:      Rate and Rhythm: Normal rate and regular rhythm.   Pulmonary:      Effort: Pulmonary effort is normal.      Breath sounds: Normal breath sounds.   Neurological:      Mental Status: He is alert. Mental status is at baseline.   Psychiatric:         Mood and Affect: Mood and affect normal.         Speech: Speech normal.         Judgment: Judgment normal.         Result Review    Result Review:  I have personally reviewed the results from the time of this admission to 8/9/2021 16:51 EDT and agree with these findings:  []  Laboratory  []  Microbiology  []  Radiology  []  EKG/Telemetry   []  Cardiology/Vascular   []  Pathology  []  Old records  []  Other:      Assessment/Plan   Assessment / Plan       Active Hospital Problems:  There are no active hospital problems to display for this patient.      Plan: Transurethral resection of the bladder tumor.    Risks and benefits discussed with patient and they are agreeable to proceed.    DVT prophylaxis:  No DVT prophylaxis order currently exists.    CODE STATUS:           Electronically signed by Yoli Le MD, 08/09/21, 4:51 PM EDT.

## 2021-08-09 NOTE — PROGRESS NOTES
Cystoscopy    Date/Time: 8/9/2021 1:42 PM  Performed by: Yoli Le MD  Authorized by: Yoli Le MD   Preparation: Patient was prepped and draped in the usual sterile fashion.  Local anesthesia used: no    Anesthesia:  Local anesthesia used: no    Sedation:  Patient sedated: no    Patient tolerance: patient tolerated the procedure well with no immediate complications  Comments: The patient presents for follow-up of superficial bladder cancer.   The patient was last seen three months ago . The patient is scheduled for repeat cysto and u/a . Cystoscopy on the last visit showed low grade superficial UC.     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      Cytoscopy Procedure:     Procedure: Flexible cytoscope was passed per urethra into the bladder without difficulty after proper consent. The bladder was inspected in a systematic meridian fashion. There were 3 papillary bladder tumors within the bladder.  One was on the right lateral wall near the dome, one was surrounding the left UO and one was surrounding the right UO.  The flexible cytoscope was removed. The patient tolerated the procedure well.

## 2021-08-10 ENCOUNTER — HOME CARE VISIT (OUTPATIENT)
Dept: HOME HEALTH SERVICES | Facility: CLINIC | Age: 86
End: 2021-08-10

## 2021-08-10 PROCEDURE — G0151 HHCP-SERV OF PT,EA 15 MIN: HCPCS

## 2021-08-10 NOTE — PRE-PROCEDURE INSTRUCTIONS
Patient instructed to have no food past midnight, clears up to 2 hours prior to arrival time. Patient instructed to take Atenolol am of surgery. Patient to bring list or medications to hospital day of surgery due to unable to verify at time of pat call. Patient voiced understanding.

## 2021-08-11 ENCOUNTER — ANESTHESIA EVENT (OUTPATIENT)
Dept: PERIOP | Facility: HOSPITAL | Age: 86
End: 2021-08-11

## 2021-08-11 VITALS
DIASTOLIC BLOOD PRESSURE: 77 MMHG | SYSTOLIC BLOOD PRESSURE: 156 MMHG | WEIGHT: 253 LBS | BODY MASS INDEX: 36.22 KG/M2 | HEIGHT: 70 IN | HEART RATE: 80 BPM | OXYGEN SATURATION: 94 %

## 2021-08-11 NOTE — HOME HEALTH
PATIENT IS 87 Y/O MALE WHO IS LEGALLY BLIND. HE HAS ONLY PARTIAL PERIPHERAL VISION.  HE LIVES ALONE WITH FAMILY MEMBERS IN THE AREA TO OCCASIONALLY ASSIST.  HE HAS A  WHO COMES TWICE A WEEK TO ASSIST.  HIS HOME HAS TWO SETS OF 13 STEPS AND IS ENTERED BY 13 STEPS UP OUT OF DOORS.  TERRAIN IS HILLY AND UNEVEN.  HIS C/O IS NECK AND SHOULDER PAIN.  HE HAS DX OF CERVICALGIA WITH DEGENERATIVE DISC DISEASE PRESENT.

## 2021-08-12 ENCOUNTER — HOME CARE VISIT (OUTPATIENT)
Dept: HOME HEALTH SERVICES | Facility: CLINIC | Age: 86
End: 2021-08-12

## 2021-08-12 PROCEDURE — G0151 HHCP-SERV OF PT,EA 15 MIN: HCPCS

## 2021-08-13 VITALS — OXYGEN SATURATION: 94 % | SYSTOLIC BLOOD PRESSURE: 124 MMHG | DIASTOLIC BLOOD PRESSURE: 76 MMHG | HEART RATE: 75 BPM

## 2021-08-13 NOTE — HOME HEALTH
PATIENT IS BEING SEEN FOR CERVICALGIA WHICH CAUSES PAIN IN HIS NECK AND SHOULDERS IN THE EVENING AND AT NIGHT MAKING IT DIFFICULT FOR HIM TO SLEEP. TODAY'S TREATMENT INVOLVED MOIST HEAT APPLIED TO NECK AND SHOULDERS WITH NO INDICATIONS OF REDNESS AFTERWARDS  ALSO IT INVOLVED DEEP TISSUE MASSAGE ALONG WITH ROM AND STRETCHING OF NECK INTO ROTATION AND SIDEBENDING WITH PATIENT ASSISTING WITH HIS UPPER EXTREMITIES.  PATIENT C/O NO PAIN AFTER TREATMENT.

## 2021-08-16 PROCEDURE — G0180 MD CERTIFICATION HHA PATIENT: HCPCS | Performed by: FAMILY MEDICINE

## 2021-08-17 ENCOUNTER — HOME CARE VISIT (OUTPATIENT)
Dept: HOME HEALTH SERVICES | Facility: CLINIC | Age: 86
End: 2021-08-17

## 2021-08-17 PROCEDURE — G0151 HHCP-SERV OF PT,EA 15 MIN: HCPCS

## 2021-08-18 ENCOUNTER — TELEPHONE (OUTPATIENT)
Dept: SURGERY | Facility: CLINIC | Age: 86
End: 2021-08-18

## 2021-08-18 VITALS — HEART RATE: 83 BPM | SYSTOLIC BLOOD PRESSURE: 136 MMHG | OXYGEN SATURATION: 91 % | DIASTOLIC BLOOD PRESSURE: 80 MMHG

## 2021-08-18 NOTE — HOME HEALTH
PATIENT BEING SEEN FOR ONGOING PAIN AND STIFFNESS IN HIS POSTERIOR CERVICAL AREA AND SAFETY IN HIS HOME DUE TO BLINDNESS.  HE REPORTS THAT HIS NECK IS FEELING BETTER, BUT STILL HAS PAIN IN THE DAY AND AT NIGHT

## 2021-08-19 ENCOUNTER — HOSPITAL ENCOUNTER (OUTPATIENT)
Facility: HOSPITAL | Age: 86
Setting detail: HOSPITAL OUTPATIENT SURGERY
Discharge: HOME | End: 2021-08-19
Attending: UROLOGY | Admitting: UROLOGY

## 2021-08-19 ENCOUNTER — ANESTHESIA (OUTPATIENT)
Dept: PERIOP | Facility: HOSPITAL | Age: 86
End: 2021-08-19

## 2021-08-19 VITALS
DIASTOLIC BLOOD PRESSURE: 74 MMHG | HEART RATE: 83 BPM | OXYGEN SATURATION: 100 % | TEMPERATURE: 97 F | BODY MASS INDEX: 36.87 KG/M2 | RESPIRATION RATE: 20 BRPM | SYSTOLIC BLOOD PRESSURE: 140 MMHG | WEIGHT: 248.9 LBS | HEIGHT: 69 IN

## 2021-08-19 DIAGNOSIS — C67.9 MALIGNANT NEOPLASM OF URINARY BLADDER, UNSPECIFIED SITE (HCC): ICD-10-CM

## 2021-08-19 LAB
GLUCOSE BLDC GLUCOMTR-MCNC: 125 MG/DL (ref 70–99)
GLUCOSE BLDC GLUCOMTR-MCNC: 144 MG/DL (ref 70–99)

## 2021-08-19 PROCEDURE — 82962 GLUCOSE BLOOD TEST: CPT

## 2021-08-19 PROCEDURE — 25010000002 MIDAZOLAM PER 1MG: Performed by: ANESTHESIOLOGY

## 2021-08-19 PROCEDURE — 88307 TISSUE EXAM BY PATHOLOGIST: CPT | Performed by: UROLOGY

## 2021-08-19 PROCEDURE — 25010000002 DEXAMETHASONE PER 1 MG: Performed by: NURSE ANESTHETIST, CERTIFIED REGISTERED

## 2021-08-19 PROCEDURE — 25010000002 LEVOFLOXACIN PER 250 MG: Performed by: UROLOGY

## 2021-08-19 PROCEDURE — 25010000002 PROPOFOL 10 MG/ML EMULSION: Performed by: NURSE ANESTHETIST, CERTIFIED REGISTERED

## 2021-08-19 PROCEDURE — 25010000002 FENTANYL CITRATE (PF) 50 MCG/ML SOLUTION: Performed by: NURSE ANESTHETIST, CERTIFIED REGISTERED

## 2021-08-19 PROCEDURE — 52235 CYSTOSCOPY AND TREATMENT: CPT | Performed by: UROLOGY

## 2021-08-19 PROCEDURE — 25010000002 ONDANSETRON PER 1 MG: Performed by: NURSE ANESTHETIST, CERTIFIED REGISTERED

## 2021-08-19 RX ORDER — SODIUM CHLORIDE 9 MG/ML
100 INJECTION, SOLUTION INTRAVENOUS CONTINUOUS
Status: DISCONTINUED | OUTPATIENT
Start: 2021-08-19 | End: 2021-08-19 | Stop reason: HOSPADM

## 2021-08-19 RX ORDER — DEXAMETHASONE SODIUM PHOSPHATE 4 MG/ML
INJECTION, SOLUTION INTRA-ARTICULAR; INTRALESIONAL; INTRAMUSCULAR; INTRAVENOUS; SOFT TISSUE AS NEEDED
Status: DISCONTINUED | OUTPATIENT
Start: 2021-08-19 | End: 2021-08-19 | Stop reason: SURG

## 2021-08-19 RX ORDER — LEVOFLOXACIN 5 MG/ML
500 INJECTION, SOLUTION INTRAVENOUS ONCE
Status: COMPLETED | OUTPATIENT
Start: 2021-08-19 | End: 2021-08-19

## 2021-08-19 RX ORDER — MEPERIDINE HYDROCHLORIDE 25 MG/ML
12.5 INJECTION INTRAMUSCULAR; INTRAVENOUS; SUBCUTANEOUS
Status: DISCONTINUED | OUTPATIENT
Start: 2021-08-19 | End: 2021-08-19 | Stop reason: HOSPADM

## 2021-08-19 RX ORDER — MIDAZOLAM HYDROCHLORIDE 2 MG/2ML
1 INJECTION, SOLUTION INTRAMUSCULAR; INTRAVENOUS ONCE
Status: COMPLETED | OUTPATIENT
Start: 2021-08-19 | End: 2021-08-19

## 2021-08-19 RX ORDER — ROCURONIUM BROMIDE 10 MG/ML
INJECTION, SOLUTION INTRAVENOUS AS NEEDED
Status: DISCONTINUED | OUTPATIENT
Start: 2021-08-19 | End: 2021-08-19 | Stop reason: SURG

## 2021-08-19 RX ORDER — PROMETHAZINE HYDROCHLORIDE 25 MG/1
25 SUPPOSITORY RECTAL ONCE AS NEEDED
Status: DISCONTINUED | OUTPATIENT
Start: 2021-08-19 | End: 2021-08-19 | Stop reason: HOSPADM

## 2021-08-19 RX ORDER — OXYCODONE HYDROCHLORIDE 5 MG/1
5 TABLET ORAL
Status: DISCONTINUED | OUTPATIENT
Start: 2021-08-19 | End: 2021-08-19 | Stop reason: HOSPADM

## 2021-08-19 RX ORDER — DEXMEDETOMIDINE HYDROCHLORIDE 100 UG/ML
INJECTION, SOLUTION INTRAVENOUS AS NEEDED
Status: DISCONTINUED | OUTPATIENT
Start: 2021-08-19 | End: 2021-08-19 | Stop reason: SURG

## 2021-08-19 RX ORDER — PROPOFOL 10 MG/ML
VIAL (ML) INTRAVENOUS AS NEEDED
Status: DISCONTINUED | OUTPATIENT
Start: 2021-08-19 | End: 2021-08-19 | Stop reason: SURG

## 2021-08-19 RX ORDER — SODIUM CHLORIDE 0.9 % (FLUSH) 0.9 %
10 SYRINGE (ML) INJECTION AS NEEDED
Status: DISCONTINUED | OUTPATIENT
Start: 2021-08-19 | End: 2021-08-19 | Stop reason: HOSPADM

## 2021-08-19 RX ORDER — SODIUM CHLORIDE 0.9 % (FLUSH) 0.9 %
10 SYRINGE (ML) INJECTION EVERY 12 HOURS SCHEDULED
Status: DISCONTINUED | OUTPATIENT
Start: 2021-08-19 | End: 2021-08-19 | Stop reason: HOSPADM

## 2021-08-19 RX ORDER — ONDANSETRON 2 MG/ML
4 INJECTION INTRAMUSCULAR; INTRAVENOUS ONCE AS NEEDED
Status: DISCONTINUED | OUTPATIENT
Start: 2021-08-19 | End: 2021-08-19 | Stop reason: HOSPADM

## 2021-08-19 RX ORDER — ONDANSETRON 4 MG/1
4 TABLET, FILM COATED ORAL ONCE AS NEEDED
Status: DISCONTINUED | OUTPATIENT
Start: 2021-08-19 | End: 2021-08-19 | Stop reason: HOSPADM

## 2021-08-19 RX ORDER — ONDANSETRON 2 MG/ML
INJECTION INTRAMUSCULAR; INTRAVENOUS AS NEEDED
Status: DISCONTINUED | OUTPATIENT
Start: 2021-08-19 | End: 2021-08-19 | Stop reason: SURG

## 2021-08-19 RX ORDER — MAGNESIUM HYDROXIDE 1200 MG/15ML
LIQUID ORAL AS NEEDED
Status: DISCONTINUED | OUTPATIENT
Start: 2021-08-19 | End: 2021-08-19 | Stop reason: HOSPADM

## 2021-08-19 RX ORDER — PROMETHAZINE HYDROCHLORIDE 12.5 MG/1
12.5 TABLET ORAL ONCE AS NEEDED
Status: DISCONTINUED | OUTPATIENT
Start: 2021-08-19 | End: 2021-08-19 | Stop reason: HOSPADM

## 2021-08-19 RX ORDER — SODIUM CHLORIDE, SODIUM LACTATE, POTASSIUM CHLORIDE, CALCIUM CHLORIDE 600; 310; 30; 20 MG/100ML; MG/100ML; MG/100ML; MG/100ML
9 INJECTION, SOLUTION INTRAVENOUS CONTINUOUS PRN
Status: DISCONTINUED | OUTPATIENT
Start: 2021-08-19 | End: 2021-08-19 | Stop reason: HOSPADM

## 2021-08-19 RX ORDER — ACETAMINOPHEN 325 MG/1
650 TABLET ORAL ONCE
Status: DISCONTINUED | OUTPATIENT
Start: 2021-08-19 | End: 2021-08-19 | Stop reason: HOSPADM

## 2021-08-19 RX ORDER — IBUPROFEN 600 MG/1
600 TABLET ORAL EVERY 6 HOURS PRN
Status: DISCONTINUED | OUTPATIENT
Start: 2021-08-19 | End: 2021-08-19 | Stop reason: HOSPADM

## 2021-08-19 RX ORDER — ACETAMINOPHEN 500 MG
1000 TABLET ORAL ONCE
Status: COMPLETED | OUTPATIENT
Start: 2021-08-19 | End: 2021-08-19

## 2021-08-19 RX ORDER — PROMETHAZINE HYDROCHLORIDE 12.5 MG/1
25 TABLET ORAL ONCE AS NEEDED
Status: DISCONTINUED | OUTPATIENT
Start: 2021-08-19 | End: 2021-08-19 | Stop reason: HOSPADM

## 2021-08-19 RX ORDER — GLYCOPYRROLATE 0.2 MG/ML
0.2 INJECTION INTRAMUSCULAR; INTRAVENOUS
Status: COMPLETED | OUTPATIENT
Start: 2021-08-19 | End: 2021-08-19

## 2021-08-19 RX ORDER — FENTANYL CITRATE 50 UG/ML
INJECTION, SOLUTION INTRAMUSCULAR; INTRAVENOUS AS NEEDED
Status: DISCONTINUED | OUTPATIENT
Start: 2021-08-19 | End: 2021-08-19 | Stop reason: SURG

## 2021-08-19 RX ORDER — LIDOCAINE HYDROCHLORIDE 20 MG/ML
INJECTION, SOLUTION INFILTRATION; PERINEURAL AS NEEDED
Status: DISCONTINUED | OUTPATIENT
Start: 2021-08-19 | End: 2021-08-19 | Stop reason: SURG

## 2021-08-19 RX ADMIN — DEXAMETHASONE SODIUM PHOSPHATE 4 MG: 4 INJECTION INTRA-ARTICULAR; INTRALESIONAL; INTRAMUSCULAR; INTRAVENOUS; SOFT TISSUE at 13:11

## 2021-08-19 RX ADMIN — ONDANSETRON 4 MG: 2 INJECTION INTRAMUSCULAR; INTRAVENOUS at 13:11

## 2021-08-19 RX ADMIN — LIDOCAINE HYDROCHLORIDE 100 MG: 20 INJECTION, SOLUTION INFILTRATION; PERINEURAL at 13:03

## 2021-08-19 RX ADMIN — ROCURONIUM BROMIDE 50 MG: 10 INJECTION INTRAVENOUS at 13:03

## 2021-08-19 RX ADMIN — DEXMEDETOMIDINE HYDROCHLORIDE 10 MCG: 100 INJECTION, SOLUTION INTRAVENOUS at 13:03

## 2021-08-19 RX ADMIN — FENTANYL CITRATE 25 MCG: 50 INJECTION INTRAMUSCULAR; INTRAVENOUS at 13:22

## 2021-08-19 RX ADMIN — LEVOFLOXACIN 500 MG: 5 INJECTION, SOLUTION INTRAVENOUS at 12:59

## 2021-08-19 RX ADMIN — SODIUM CHLORIDE, POTASSIUM CHLORIDE, SODIUM LACTATE AND CALCIUM CHLORIDE 9 ML/HR: 600; 310; 30; 20 INJECTION, SOLUTION INTRAVENOUS at 12:51

## 2021-08-19 RX ADMIN — PROPOFOL 150 MG: 10 INJECTION, EMULSION INTRAVENOUS at 13:03

## 2021-08-19 RX ADMIN — FENTANYL CITRATE 25 MCG: 50 INJECTION INTRAMUSCULAR; INTRAVENOUS at 13:27

## 2021-08-19 RX ADMIN — ACETAMINOPHEN 1000 MG: 500 TABLET ORAL at 12:46

## 2021-08-19 RX ADMIN — MIDAZOLAM HYDROCHLORIDE 1 MG: 1 INJECTION, SOLUTION INTRAMUSCULAR; INTRAVENOUS at 12:48

## 2021-08-19 RX ADMIN — GLYCOPYRROLATE 0.2 MG: 0.2 INJECTION INTRAMUSCULAR; INTRAVENOUS at 12:48

## 2021-08-19 RX ADMIN — SUGAMMADEX 200 MG: 100 INJECTION, SOLUTION INTRAVENOUS at 13:37

## 2021-08-19 RX ADMIN — FENTANYL CITRATE 50 MCG: 50 INJECTION INTRAMUSCULAR; INTRAVENOUS at 13:03

## 2021-08-19 NOTE — ANESTHESIA PREPROCEDURE EVALUATION
Anesthesia Evaluation     Patient summary reviewed and Nursing notes reviewed   NPO Solid Status: > 6 hours  NPO Liquid Status: > 6 hours           Airway   Mallampati: III  TM distance: <3 FB  Neck ROM: limited  Possible difficult intubation  Dental      Pulmonary - normal exam   (+) shortness of breath,   Cardiovascular - normal exam  Exercise tolerance: good (4-7 METS)    (+) hypertension, past MI , CAD, dysrhythmias Atrial Fib, hyperlipidemia,       Neuro/Psych  GI/Hepatic/Renal/Endo    (+) obesity,  GERD,  diabetes mellitus,     Musculoskeletal     Abdominal    Substance History      OB/GYN          Other                        Anesthesia Plan    ASA 4     general and MAC     intravenous induction

## 2021-08-19 NOTE — ANESTHESIA POSTPROCEDURE EVALUATION
Patient: Toney Neri    Procedure Summary     Date: 08/19/21 Room / Location: [facility] OR 06 / [facility] MAIN OR    Anesthesia Start: 1259 Anesthesia Stop: 1350    Procedure: CYSTOSCOPY TRANSURETHRAL RESECTION OF BLADDER TUMOR (N/A ) Diagnosis:       Malignant neoplasm of urinary bladder, unspecified site (CMS/HCC)      (Malignant neoplasm of urinary bladder, unspecified site (CMS/HCC) [C67.9])    Surgeons: Yoli Le MD Provider: Hunter Manzo MD    Anesthesia Type: general, MAC ASA Status: 4          Anesthesia Type: general, MAC    Vitals  Vitals Value Taken Time   /78 08/19/21 1412   Temp 36.5 °C (97.7 °F) 08/19/21 1352   Pulse 65 08/19/21 1423   Resp 17 08/19/21 1352   SpO2 89 % 08/19/21 1423   Vitals shown include unvalidated device data.        Post Anesthesia Care and Evaluation    Patient location during evaluation: bedside  Patient participation: complete - patient participated  Level of consciousness: awake  Pain score: 0  Pain management: adequate  Airway patency: patent  Anesthetic complications: No anesthetic complications  PONV Status: none  Cardiovascular status: acceptable and stable  Respiratory status: acceptable and room air  Hydration status: acceptable    Comments: An Anesthesiologist personally participated in the most demanding procedures (including induction and emergence if applicable) in the anesthesia plan, monitored the course of anesthesia administration at frequent intervals and remained physically present and available for immediate diagnosis and treatment of emergencies.

## 2021-08-19 NOTE — OP NOTE
CYSTOSCOPY TRANSURETHRAL RESECTION OF BLADDER TUMOR  Procedure Report    Patient Name:  Toney Neri  YOB: 1935    Date of Surgery:  8/19/2021     Pre-op Diagnosis:   Malignant neoplasm of urinary bladder, unspecified site (CMS/HCC) [C67.9]       Post-Op Diagnosis Codes:     * Malignant neoplasm of urinary bladder, unspecified site (CMS/HCC) [C67.9]    Procedure/CPT® Codes:      Procedure(s):  CYSTOSCOPY TRANSURETHRAL RESECTION OF BLADDER TUMOR    Staff:  Surgeon(s):  Yoli Le MD         Anesthesia: General    Estimated Blood Loss: none    Implants:    Nothing was implanted during the procedure    Specimen:          Specimens     ID Source Type Tests Collected By Collected At Frozen?    A Urinary Bladder Tissue · TISSUE PATHOLOGY EXAM   Yoli Le MD 8/19/21 1585 No    Description: BLADDER TUMOR              Complications: None    Description of Procedure: After proper consent was obtained, patient was taken to operating room and general anesthesia was performed.  The patient was placed in the dorsal lithotomy position and prepped and draped in the normal sterile fashion for cystoscopy.      A 22 Tristanian rigid cystoscope was inserted into the bladder.  The bladder was inspected in a systemic meridian fashion using a 30 degree lens.  There were 3 small papillary bladder tumors.  One was just lateral to the right UO and was just lateral to the left UO and one was on the dome of the bladder.  A gyrus resectoscope was inserted into the bladder with a medium loop.  The loop was then used to resect the tumor in their entirety.  There was no perforation of the bladder noted.  The area of resection was then fulgurated to stop any bleeding.  Good muscle was sent with the specimen.  The entire area of resection was approximately 3 cm in size.  Again, no peroration of the bladder was noted.      The pieces of bladder tumor were then removed from the bladder and sent to pathology labeled as  bladder tumor.  There was no remaining bleeding.      The patient tolerated the procedure well and was transferred to the PACU in stable condition.            Yoli Le MD     Date: 8/19/2021  Time: 13:43 EDT

## 2021-08-19 NOTE — DISCHARGE INSTRUCTIONS
DISCHARGE INSTRUCTIONS CYSTOSCOPY, Resection of Bladder Tumor      ? For your surgery you had:  ? General anesthesia (you may have a sore throat for the first 24 hours)  ? You may experience dizziness, drowsiness, or lightheadedness for several hours following surgery.  ? Do not stay alone today or tonight.  ? Limit your activity for 24 hours.  ? You should not drive, operate machinery, drink alcohol, or sign legally binding documents for 24 hours or while you are taking pain medication.  ? Resume your diet slowly.  Follow any special dietary instructions you may have been given by your doctor.  Last dose of pain medication given at: tylenol last at 12:45pm. May take again at 6:45pm  NOTIFY YOUR DOCTOR IF YOU EXPERIENCE ANY OF THE FOLLOWING:  ? Temperature greater than 101 degrees Fahrenheit  ? Shaking Chills  ? Redness or excessive drainage from incision  ? Nausea, vomiting and/or pain that is not controlled by prescribed medications  ? Increase in bleeding or bleeding that is excessive  ? Unable to urinate in 6 hours after surgery  ? If unable to reach your doctor, please go to the closest Emergency Room   ? Following your procedure, you may experience burning upon urination.  You may also pass some bloody urine.  If the burning sensation and/or bloody urine should persist beyond 2 weeks, call your doctor.  ? To encourage kidney and bladder function, you should drink as much fluid as possible.  ? If you have difficulty urinating, try sitting in a bath tub of warm water.  If you become uncomfortable because you cannot urinate, call your doctor or come to the Emergency Room at the hospital.  ? Medications per physician instructions as indicated on Discharge Medication Information Sheet.      SPECIAL INSTRUCTIONS:

## 2021-08-20 ENCOUNTER — HOME CARE VISIT (OUTPATIENT)
Dept: HOME HEALTH SERVICES | Facility: CLINIC | Age: 86
End: 2021-08-20

## 2021-08-20 LAB
CYTO UR: NORMAL
LAB AP CASE REPORT: NORMAL
LAB AP CLINICAL INFORMATION: NORMAL
PATH REPORT.FINAL DX SPEC: NORMAL
PATH REPORT.GROSS SPEC: NORMAL

## 2021-08-20 PROCEDURE — G0151 HHCP-SERV OF PT,EA 15 MIN: HCPCS

## 2021-08-22 VITALS — HEART RATE: 78 BPM | DIASTOLIC BLOOD PRESSURE: 60 MMHG | SYSTOLIC BLOOD PRESSURE: 112 MMHG | OXYGEN SATURATION: 98 %

## 2021-08-22 NOTE — HOME HEALTH
PATIENT IS BEING SEEN FOR ONGOING PAIN AND DECREASED ROM IN CERVICAL AREA.  HE IS LEGALLY BLIND AND YESTERDAY UNDERWENT SURGICAL PROCEDURE TO REMOVE TUMORS FROM HIS BLADDER.  HE SAYS HE IS ALRIGHT TO WORK ON HIS P.T. TODAY.  HE BELIEVES THE TREATMENTS ARE HELPINGHIS AS HIS NECK IS NOT AS STIFF AS IT HAS BEEN, BUT HIS PAIN IS STILL PRESENT IN THE AFTERNOON AND EVENING AT 5/10.

## 2021-08-23 ENCOUNTER — TELEPHONE (OUTPATIENT)
Dept: UROLOGY | Facility: CLINIC | Age: 86
End: 2021-08-23

## 2021-08-23 NOTE — TELEPHONE ENCOUNTER
Spoke to patient and let him know that we can change his appointment to phone visit per . He is aware we will contact him on 8/25.

## 2021-08-23 NOTE — TELEPHONE ENCOUNTER
Pt is wanting to know it apt can be done over the telephone instead of coming into the office, because of transportation issues and covid.

## 2021-08-24 RX ORDER — CARBOXYMETHYLCELLULOSE SODIUM 0.5 G/100ML
SOLUTION/ DROPS OPHTHALMIC
COMMUNITY
Start: 2021-08-17 | End: 2022-03-21 | Stop reason: SDUPTHER

## 2021-08-25 ENCOUNTER — HOME CARE VISIT (OUTPATIENT)
Dept: HOME HEALTH SERVICES | Facility: CLINIC | Age: 86
End: 2021-08-25

## 2021-08-25 ENCOUNTER — OFFICE VISIT (OUTPATIENT)
Dept: UROLOGY | Facility: CLINIC | Age: 86
End: 2021-08-25

## 2021-08-25 VITALS — HEART RATE: 83 BPM | OXYGEN SATURATION: 98 % | SYSTOLIC BLOOD PRESSURE: 100 MMHG | DIASTOLIC BLOOD PRESSURE: 61 MMHG

## 2021-08-25 DIAGNOSIS — C67.8 MALIGNANT NEOPLASM OF OVERLAPPING SITES OF BLADDER (HCC): Primary | ICD-10-CM

## 2021-08-25 PROCEDURE — 99441 PR PHYS/QHP TELEPHONE EVALUATION 5-10 MIN: CPT | Performed by: UROLOGY

## 2021-08-25 PROCEDURE — G0151 HHCP-SERV OF PT,EA 15 MIN: HCPCS

## 2021-08-25 NOTE — PROGRESS NOTES
Chief Complaint  Follow-up    Subjective          Toney Neri presents to [facility] UROLOGY  Telephone visit: Consent was obtained today prior to starting the visit.  Telephone visit was conducted for 5 minutes.  Carolyn Bobo LPN was present.      He has no issues after his TURBT last week.  It did not reveal any recurrent bladder cancer.  He has had no complaints.        Result Review :       Data reviewed: Pathology results from recent bladder biopsy          Assessment and Plan    Diagnoses and all orders for this visit:    1. Malignant neoplasm of overlapping sites of bladder (CMS/HCC) (Primary)  Assessment & Plan:  Cystoscopy in the office in 3 to 4 months.        Follow Up   No follow-ups on file.  Patient was given instructions and counseling regarding his condition or for health maintenance advice. Please see specific information pulled into the AVS if appropriate.

## 2021-08-26 NOTE — HOME HEALTH
PATIENT IS BEING SEEN FOR ONGOING CERVICALGIA DUE TO DEGENERATIVE DISC DISEASE AND OSTEOARTHRITIS.  HE REPORTS THAT HE IS IMPROVING AND HIS NECK FEELS MUCH MORE MOBILE OW THAN INITIALLY

## 2021-09-01 ENCOUNTER — HOME CARE VISIT (OUTPATIENT)
Dept: HOME HEALTH SERVICES | Facility: CLINIC | Age: 86
End: 2021-09-01

## 2021-09-01 VITALS — SYSTOLIC BLOOD PRESSURE: 116 MMHG | OXYGEN SATURATION: 99 % | HEART RATE: 70 BPM | DIASTOLIC BLOOD PRESSURE: 71 MMHG

## 2021-09-01 PROCEDURE — G0151 HHCP-SERV OF PT,EA 15 MIN: HCPCS

## 2021-09-02 NOTE — HOME HEALTH
PATIENT IS 87 Y/O M WHO IS BEING TREATED FOR CERVICALGIA.  HE REPORTS THAT HE IS STEADILY IMPROVING WITH PAIN AT A 4/10 LEVEL IN THE EVENING AND AT NIGHT.  HEHAS A MASSAGER WITH HEAT WHICH HE USES AS WELL TO HELP ALLEVIATE THE PAIN.  TODAY'S TREATMENT INVOLVED MOIST HEAT PACK, DISTRACTION OF NECK IN SITTING, MASSAGE TO POSTERIOR NECK MUSCLES AND TO UPPER TRAPEZIUS MM AND OVER LOWER CERVICAL/UPPER THORACIC SPINAL COLUMN AS THOSE MUSCLES ARE VERY TIGHT AND SLIGHTLY PAINFUL.  PATIENTHAS SEVERELY FORWARD SHORTENED NECK AND HEAD POSITIONING WHICH CAUSES THOSE MUSCLES TO BE OVER USED AND BY THE END OF THE DAY ARE VERY PAINFUL AND FATIGUED.  PATIENT EDUCATED IN STRENGTHENING OF THOSE MUSCLES AND IMPROVING HIS POSTURE OF THE CERVICAL AREA AS HE CAN.

## 2021-09-07 ENCOUNTER — HOME CARE VISIT (OUTPATIENT)
Dept: HOME HEALTH SERVICES | Facility: CLINIC | Age: 86
End: 2021-09-07

## 2021-09-07 VITALS — HEART RATE: 89 BPM | SYSTOLIC BLOOD PRESSURE: 123 MMHG | DIASTOLIC BLOOD PRESSURE: 73 MMHG | OXYGEN SATURATION: 98 %

## 2021-09-07 PROCEDURE — G0151 HHCP-SERV OF PT,EA 15 MIN: HCPCS

## 2021-09-08 NOTE — HOME HEALTH
PATIENT IS BEING SEEN AND REASSESSED FOR CONTINUED P.T. TO TREAT CERVICALGIA.  HE IS PROGRESSING WELL, BUT STILL C/O PAIN AT THE END OF THE DAY ANDNIGHT OF 4/10.  HE IS TOLERATING STRETCHING AND STRENGTHENING OF CERVICAL MUSCLES WELL AND NEEDS TO CONTINUE TO IMPROVE TO HIS HIGHEST LEVEL AND TO LEARN HOW TO MAINTAIN THIS LEVEL IN THE FUTURE.

## 2021-09-15 ENCOUNTER — HOME CARE VISIT (OUTPATIENT)
Dept: HOME HEALTH SERVICES | Facility: CLINIC | Age: 86
End: 2021-09-15

## 2021-09-15 PROCEDURE — G0151 HHCP-SERV OF PT,EA 15 MIN: HCPCS

## 2021-09-15 NOTE — PROGRESS NOTES
[facility]  Cardiology progress Note    Patient Name: Toney Neri  : 1935    CHIEF COMPLAINT  Atrial fibrillation.      Subjective   Subjective     HISTORY OF PRESENT ILLNESS    Toney Neri is a 86 y.o. male with history of atrial fibrillation and hypertension.  No chest pain or shortness of breath.  No palpitations.    Review of Systems:   Constitutional no fever,  no weight loss   Skin no rash   Otolaryngeal no difficulty swallowing   Cardiovascular See HPI   Pulmonary no cough, no sputum production   Gastrointestinal no constipation, no diarrhea   Genitourinary no dysuria, no hematuria   Hematologic no easy bruisability, no abnormal bleeding   Musculoskeletal no muscle pain   Neurologic no dizziness, no falls         Personal History     Social History:  reports that he has quit smoking. His smoking use included cigarettes. He has never used smokeless tobacco. He reports current alcohol use. He reports that he does not use drugs.    Home Medications:  Current Outpatient Medications on File Prior to Visit   Medication Sig   • acetaminophen (TYLENOL) 325 MG tablet Take 325 mg by mouth Daily.   • Adalat CC 90 MG 24 hr tablet    • albuterol sulfate HFA (ProAir HFA) 108 (90 Base) MCG/ACT inhaler Inhale 1 puff Daily As Needed.   • apixaban (Eliquis) 5 MG tablet tablet Take 5 mg by mouth 2 (two) times a day. Dr. Le instructed patient to stop 2 days prior to surgery.   • Aspirin Low Dose 81 MG EC tablet Take 81 mg by mouth Daily.   • atenolol (TENORMIN) 50 MG tablet Take 50 mg by mouth Daily. Takes 50 mg in am and 2 tabs 100 mg at night   • carboxymethylcellulose (REFRESH PLUS) 0.5 % solution 1 drop 4 (Four) Times a Day.   • finasteride (PROSCAR) 5 MG tablet Take 1 tablet by mouth Daily for 360 days.   • fluticasone (FLONASE) 50 MCG/ACT nasal spray 1 spray by Each Nare route Daily.   • hydroCHLOROthiazide (HYDRODIURIL) 25 MG tablet Take 25 mg by mouth Daily.   • Loratadine 10 MG  capsule Take 10 mg by mouth Daily.   • Lubricating Plus Eye Drops 0.5 % solution    • Magnesium Oxide 400 (240 Mg) MG tablet Take 400 mg by mouth Daily.   • melatonin 3 MG tablet Take 6 mg by mouth every night at bedtime.   • metFORMIN (GLUCOPHAGE) 1000 MG tablet Take 1,000 mg by mouth 2 (Two) Times a Day.   • NIFEdipine XL (PROCARDIA XL) 90 MG 24 hr tablet Take 90 mg by mouth Daily.   • pantoprazole (PROTONIX) 40 MG EC tablet Take 40 mg by mouth Daily.   • potassium chloride 10 MEQ CR tablet Take 10 mEq by mouth Daily.   • simvastatin (ZOCOR) 40 MG tablet Take 40 mg by mouth Daily.   • tamsulosin (FLOMAX) 0.4 MG capsule 24 hr capsule Take 1 capsule by mouth Daily.   • terazosin (HYTRIN) 10 MG capsule Take 10 mg by mouth Daily.   • tiZANidine (ZANAFLEX) 2 MG tablet Take 1 tablet by mouth Every 8 (Eight) Hours As Needed for Muscle Spasms.     No current facility-administered medications on file prior to visit.     Allergies:  No Known Allergies    Objective    Objective       Vitals:   Heart Rate:  [78] 78  BP: (120-126)/(66) 120/66  Body mass index is 37.36 kg/m².     Physical Exam:   Constitutional: Awake, alert, No acute distress    Eyes: PERRLA, sclerae anicteric, no conjunctival injection   HENT: NCAT, mucous membranes moist   Neck: Supple, no thyromegaly, no lymphadenopathy, trachea midline   Respiratory: Clear to auscultation bilaterally, nonlabored respirations    Cardiovascular: RRR, no murmurs or rubs. Palpable pedal pulses bilaterally   Musculoskeletal: No bilateral ankle edema, no cyanosis to extremities   Psychiatric: Appropriate affect, cooperative   Neurologic: Oriented x 3, strength symmetric in all extremities, Cranial Nerves grossly intact to confrontation, speech clear   Skin: No rashes.    Result Review    Result Review:  I have personally reviewed the available results from  [x]  Laboratory  [x]  EKG  [x]  Cardiology  [x]  Medications  [x]  Old records  []  Other:     ECG 12 Lead    Date/Time:  9/16/2021 12:55 PM  Performed by: Jacobo Sweet MD  Authorized by: Jacobo Sweet MD   Comparison: compared with previous ECG   Similar to previous ECG  Rhythm: atrial fibrillation    Clinical impression: abnormal EKG  Comments: Atrial fibrillation with a controlled heart rate.  Inferior wall MI age-indeterminate.  Antral MI age-indeterminate.  No significant changes compared to previous EKG.          No results found for: CHOL  Lab Results   Component Value Date    TRIG 107 02/10/2020     Lab Results   Component Value Date    HDL 50 02/10/2020     Lab Results   Component Value Date    LDL 30 (L) 02/10/2020     Lab Results   Component Value Date    VLDL 21 02/10/2020         Impression/Plan:  1.  Permanent atrial fibrillation with a controlled heart rate: Continue Eliquis.  Continue atenolol for rate control.  2.  Essential hypertension controlled: Continue nifedipine.  Low-salt diet advised.  3.  Hyperlipidemia: Continue simvastatin.  Lipid profile reviewed.  Low-fat diet advised.  4.  Morbid obesity: Low-fat diet and exercise advised.    Jacobo Sweet MD   09/16/21   12:51 EDT

## 2021-09-16 ENCOUNTER — OFFICE VISIT (OUTPATIENT)
Dept: CARDIOLOGY | Facility: CLINIC | Age: 86
End: 2021-09-16

## 2021-09-16 VITALS — HEART RATE: 78 BPM | OXYGEN SATURATION: 97 % | DIASTOLIC BLOOD PRESSURE: 66 MMHG | SYSTOLIC BLOOD PRESSURE: 126 MMHG

## 2021-09-16 VITALS
DIASTOLIC BLOOD PRESSURE: 66 MMHG | SYSTOLIC BLOOD PRESSURE: 120 MMHG | WEIGHT: 253 LBS | HEIGHT: 69 IN | BODY MASS INDEX: 37.47 KG/M2 | HEART RATE: 78 BPM

## 2021-09-16 DIAGNOSIS — I10 HYPERTENSION, ESSENTIAL: ICD-10-CM

## 2021-09-16 DIAGNOSIS — E78.2 HYPERLIPEMIA, MIXED: ICD-10-CM

## 2021-09-16 DIAGNOSIS — E66.01 MORBID OBESITY (HCC): ICD-10-CM

## 2021-09-16 DIAGNOSIS — I48.21 PERMANENT ATRIAL FIBRILLATION (HCC): Primary | ICD-10-CM

## 2021-09-16 PROCEDURE — 93000 ELECTROCARDIOGRAM COMPLETE: CPT | Performed by: SPECIALIST

## 2021-09-16 PROCEDURE — 99214 OFFICE O/P EST MOD 30 MIN: CPT | Performed by: SPECIALIST

## 2021-09-16 NOTE — HOME HEALTH
PATIENT IS BEING SEEN FOR CERVICALGIA AND  SAFETY IN HIS HOME.  HE REPORTS THAT HIS NECK IS STEADILY IMPROVING IN ROM AND DECREASED PAIN ANDHE HAS HAD NO FALLS.

## 2021-09-21 ENCOUNTER — HOME CARE VISIT (OUTPATIENT)
Dept: HOME HEALTH SERVICES | Facility: CLINIC | Age: 86
End: 2021-09-21

## 2021-09-21 VITALS — OXYGEN SATURATION: 98 % | SYSTOLIC BLOOD PRESSURE: 136 MMHG | DIASTOLIC BLOOD PRESSURE: 71 MMHG | HEART RATE: 94 BPM

## 2021-09-21 PROCEDURE — G0151 HHCP-SERV OF PT,EA 15 MIN: HCPCS

## 2021-09-22 NOTE — HOME HEALTH
PATIENT IS BEING SEEN FOR ONGOING CERVICALGIA, PAIN AND STIFFNESS AS WELL AS DECREASED ROM AND STRENGTH IN CERVICAL AND UPPER THORAC IC MUSCULATURE.  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  CAREGIVER (ASSIST) IS WITH HIM TODAY AND IS WILLING TO LEARN HIS HEP AND ASSIST HIM WITH IT DURING HER FUTURE VISITS

## 2021-09-28 ENCOUNTER — HOME CARE VISIT (OUTPATIENT)
Dept: HOME HEALTH SERVICES | Facility: CLINIC | Age: 86
End: 2021-09-28

## 2021-09-28 PROCEDURE — G0151 HHCP-SERV OF PT,EA 15 MIN: HCPCS

## 2021-09-29 VITALS — HEART RATE: 89 BPM | DIASTOLIC BLOOD PRESSURE: 93 MMHG | SYSTOLIC BLOOD PRESSURE: 156 MMHG | OXYGEN SATURATION: 97 %

## 2021-09-29 NOTE — HOME HEALTH
PATIENT IS BEING SEEN FOR ONGOING CERVICALGIA.  HE IS LEGALLY BLIND, BUT HAS PERIPHERAL VISION IN RIGHT EYE AND MANAGES TO LIVE ALONE WITH CAREGIVER COMING IN TWICE A WEEK FOR HOUSEHOLD WORK, ETC.  HE HAS IMPROVED MARKEDLY AND IS NOW ABLE TO CONTINUE WITH HER HEP WITHOUT ASSIST

## 2021-12-20 ENCOUNTER — OFFICE VISIT (OUTPATIENT)
Dept: UROLOGY | Facility: CLINIC | Age: 86
End: 2021-12-20

## 2021-12-20 VITALS
WEIGHT: 257.8 LBS | HEIGHT: 70 IN | SYSTOLIC BLOOD PRESSURE: 132 MMHG | BODY MASS INDEX: 36.91 KG/M2 | DIASTOLIC BLOOD PRESSURE: 73 MMHG

## 2021-12-20 DIAGNOSIS — C67.8 MALIGNANT NEOPLASM OF OVERLAPPING SITES OF BLADDER (HCC): Primary | ICD-10-CM

## 2021-12-20 LAB
BILIRUB BLD-MCNC: NEGATIVE MG/DL
CLARITY, POC: CLEAR
COLOR UR: YELLOW
EXPIRATION DATE: ABNORMAL
GLUCOSE UR STRIP-MCNC: ABNORMAL MG/DL
KETONES UR QL: NEGATIVE
LEUKOCYTE EST, POC: NEGATIVE
Lab: ABNORMAL
NITRITE UR-MCNC: NEGATIVE MG/ML
PH UR: 5 [PH] (ref 5–8)
PROT UR STRIP-MCNC: NEGATIVE MG/DL
RBC # UR STRIP: ABNORMAL /UL
SP GR UR: 1.02 (ref 1–1.03)
UROBILINOGEN UR QL: NORMAL

## 2021-12-20 PROCEDURE — 52000 CYSTOURETHROSCOPY: CPT | Performed by: UROLOGY

## 2021-12-20 PROCEDURE — 81003 URINALYSIS AUTO W/O SCOPE: CPT | Performed by: UROLOGY

## 2021-12-20 RX ORDER — LORATADINE 10 MG/1
10 TABLET ORAL EVERY MORNING
COMMUNITY
Start: 2021-10-20

## 2021-12-20 RX ORDER — MAGNESIUM OXIDE 400 MG/1
400 TABLET ORAL DAILY
COMMUNITY
Start: 2021-07-20 | End: 2022-03-21 | Stop reason: SDUPTHER

## 2021-12-20 NOTE — PROGRESS NOTES
Cystoscopy    Date/Time: 12/20/2021 2:52 PM  Performed by: Yoli Le MD  Authorized by: Yoli Le MD   Preparation: Patient was prepped and draped in the usual sterile fashion.  Local anesthesia used: no    Anesthesia:  Local anesthesia used: no    Sedation:  Patient sedated: no    Patient tolerance: patient tolerated the procedure well with no immediate complications  Comments: The patient presents for follow-up of superficial bladder cancer.   The patient was last seen three months ago . The patient is scheduled for repeat cysto and u/a . Cystoscopy on the last visit showed low grade superficial UC.     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    Cystoscopy in June 2021 was normal with no residual tumor seen.        Cytoscopy Procedure:     Procedure: Flexible cytoscope was passed per urethra into the bladder without difficulty after proper consent. The bladder was inspected in a systematic meridian fashion. There were no tumors, lesions, stones, or other abnormalities noted within the bladder. Of note, there was no increased vascularity as well. Both ureteral orifices were identified and were normal in appearance. The flexible cytoscope was removed. The patient tolerated the procedure well.

## 2022-03-21 ENCOUNTER — PREP FOR SURGERY (OUTPATIENT)
Dept: OTHER | Facility: HOSPITAL | Age: 87
End: 2022-03-21

## 2022-03-21 ENCOUNTER — OFFICE VISIT (OUTPATIENT)
Dept: UROLOGY | Facility: CLINIC | Age: 87
End: 2022-03-21

## 2022-03-21 VITALS
WEIGHT: 255.2 LBS | DIASTOLIC BLOOD PRESSURE: 54 MMHG | HEIGHT: 70 IN | BODY MASS INDEX: 36.54 KG/M2 | SYSTOLIC BLOOD PRESSURE: 115 MMHG

## 2022-03-21 DIAGNOSIS — C67.8 MALIGNANT NEOPLASM OF OVERLAPPING SITES OF BLADDER: Primary | ICD-10-CM

## 2022-03-21 LAB
BILIRUB BLD-MCNC: NEGATIVE MG/DL
CLARITY, POC: CLEAR
COLOR UR: YELLOW
EXPIRATION DATE: ABNORMAL
GLUCOSE UR STRIP-MCNC: ABNORMAL MG/DL
KETONES UR QL: ABNORMAL
LEUKOCYTE EST, POC: NEGATIVE
Lab: ABNORMAL
NITRITE UR-MCNC: NEGATIVE MG/ML
PH UR: 6 [PH] (ref 5–8)
PROT UR STRIP-MCNC: ABNORMAL MG/DL
RBC # UR STRIP: ABNORMAL /UL
SP GR UR: 1.02 (ref 1–1.03)
UROBILINOGEN UR QL: NORMAL

## 2022-03-21 PROCEDURE — 81003 URINALYSIS AUTO W/O SCOPE: CPT | Performed by: UROLOGY

## 2022-03-21 PROCEDURE — 52000 CYSTOURETHROSCOPY: CPT | Performed by: UROLOGY

## 2022-03-21 RX ORDER — SODIUM CHLORIDE 0.9 % (FLUSH) 0.9 %
10 SYRINGE (ML) INJECTION AS NEEDED
Status: CANCELLED | OUTPATIENT
Start: 2022-03-21

## 2022-03-21 RX ORDER — SODIUM CHLORIDE 0.9 % (FLUSH) 0.9 %
10 SYRINGE (ML) INJECTION EVERY 12 HOURS SCHEDULED
Status: CANCELLED | OUTPATIENT
Start: 2022-03-21

## 2022-03-21 RX ORDER — LEVOFLOXACIN 5 MG/ML
500 INJECTION, SOLUTION INTRAVENOUS ONCE
Status: CANCELLED | OUTPATIENT
Start: 2022-03-21 | End: 2022-03-21

## 2022-03-21 RX ORDER — SODIUM CHLORIDE 9 MG/ML
100 INJECTION, SOLUTION INTRAVENOUS CONTINUOUS
Status: CANCELLED | OUTPATIENT
Start: 2022-03-21

## 2022-03-21 NOTE — H&P
[facility]   Urology HISTORY AND PHYSICAL    Patient Name: Toney Neri  : 1935  MRN: [id number]  Primary Care Physician:  Alverto Lopez MD  Date of admission: (Not on file)    Subjective   Subjective       Patient has 3 new papillary tumors.  1 is on the dome of the bladder tumor on the right lateral wall.  He presents for TURBT.      Personal History     Past Medical History:   Diagnosis Date   • Acid reflux    • Arthritis    • Atrial fibrillation (HCC)    • Coronary artery disease    • Diabetes (HCC)    • Diabetes mellitus, type 2 (HCC)    • Elevated PSA    • High cholesterol    • Hypertension    • Limb swelling    • Myocardial infarction (HCC)    • Shortness of breath    • Skin lesion    • Umbilical hernia    • Urinary stream slowing    • Urothelial carcinoma of bladder (HCC) 2021       Past Surgical History:   Procedure Laterality Date   • ANKLE SURGERY Right     ankle repair   • COLONOSCOPY     • CYSTOSCOPY     • ENDOSCOPY  2018   • GALLBLADDER SURGERY     • ROTATOR CUFF REPAIR Right 1988   • TONSILLECTOMY     • TRANSURETHRAL RESECTION OF BLADDER TUMOR N/A 2021    Procedure: CYSTOSCOPY TRANSURETHRAL RESECTION OF BLADDER TUMOR;  Surgeon: Yoli Le MD;  Location: [facility];  Service: Urology;  Laterality: N/A;       Family History: family history includes Arthritis in his father and mother; Diabetes in an other family member; Heart disease in his mother; Prostate cancer in his father and son. Otherwise pertinent FHx was reviewed and not pertinent to current issue.    Social History:  reports that he has quit smoking. His smoking use included cigarettes. He has never used smokeless tobacco. He reports current alcohol use. He reports that he does not use drugs.    Home Medications:  Magnesium Oxide, NIFEdipine CC, NIFEdipine XL, acetaminophen, albuterol sulfate HFA, apixaban, aspirin, atenolol, carboxymethylcellulose, finasteride, fluticasone,  hydroCHLOROthiazide, loratadine, melatonin, metFORMIN, pantoprazole, potassium chloride, simvastatin, tamsulosin, terazosin, and tiZANidine    Allergies:  No Known Allergies    Objective    Objective     Vitals:   BP: (115)/(54) 115/54    Physical Exam    Result Review    Result Review:  I have personally reviewed the results from the time of this admission to 3/21/2022 15:18 EDT and agree with these findings:  [x]  Laboratory  []  Microbiology  []  Radiology  []  EKG/Telemetry   []  Cardiology/Vascular   []  Pathology  [x]  Old records  []  Other:      Assessment/Plan   Assessment / Plan       Active Hospital Problems:  There are no active hospital problems to display for this patient.      Plan: Cystoscopy, bilateral retrograde pyelograms, transurethral resection of bladder tumor.  Risks and benefits discussed with patient and they are agreeable to proceed.    DVT prophylaxis:  No DVT prophylaxis order currently exists.    CODE STATUS:           Electronically signed by Yoli Le MD, 03/21/22, 3:18 PM EDT.

## 2022-03-21 NOTE — PROGRESS NOTES
.Cystoscopy    Date/Time: 3/21/2022 2:57 PM  Performed by: Yoli Le MD  Authorized by: Yoli Le MD   Preparation: Patient was prepped and draped in the usual sterile fashion.  Local anesthesia used: no    Anesthesia:  Local anesthesia used: no    Sedation:  Patient sedated: no    Patient tolerance: patient tolerated the procedure well with no immediate complications  Comments: The patient presents for follow-up of superficial bladder cancer.   The patient was last seen three months ago . The patient is scheduled for repeat cysto and u/a . Cystoscopy on the last visit showed low grade superficial UC.     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      Cystoscopy in June 2021 was normal with no residual tumor seen.    Cysto in Dec 2021 was normal with no tumor seen.     Cytoscopy Procedure:     Procedure: Flexible cytoscope was passed per urethra into the bladder without difficulty after proper consent. The bladder was inspected in a systematic meridian fashion. There were three small tumors, one at the dome and two on the right lateral wall that were papillary in nature. Of note, there was no increased vascularity as well. Both ureteral orifices were identified and were normal in appearance. The flexible cytoscope was removed. The patient tolerated the procedure well.

## 2022-04-19 ENCOUNTER — TELEPHONE (OUTPATIENT)
Dept: SURGERY | Facility: CLINIC | Age: 87
End: 2022-04-19

## 2022-04-19 RX ORDER — MULTIPLE VITAMINS W/ MINERALS TAB 9MG-400MCG
1 TAB ORAL DAILY
COMMUNITY
End: 2023-02-24

## 2022-04-19 RX ORDER — MULTIVIT WITH MINERALS/LUTEIN
250 TABLET ORAL DAILY
COMMUNITY

## 2022-04-19 NOTE — TELEPHONE ENCOUNTER
This call was transferred to me to discuss with Jaylin.     I notified her that  was to stay on his Aspirin, but to stop his Eliquis for 2 days prior to his procedure.       Jaylin will contact patient with that information again as he did not remember this information.

## 2022-04-21 ENCOUNTER — ANESTHESIA EVENT (OUTPATIENT)
Dept: PERIOP | Facility: HOSPITAL | Age: 87
End: 2022-04-21

## 2022-04-26 ENCOUNTER — APPOINTMENT (OUTPATIENT)
Dept: GENERAL RADIOLOGY | Facility: HOSPITAL | Age: 87
End: 2022-04-26

## 2022-04-26 ENCOUNTER — ANESTHESIA (OUTPATIENT)
Dept: PERIOP | Facility: HOSPITAL | Age: 87
End: 2022-04-26

## 2022-04-26 ENCOUNTER — HOSPITAL ENCOUNTER (OUTPATIENT)
Facility: HOSPITAL | Age: 87
Setting detail: HOSPITAL OUTPATIENT SURGERY
Discharge: HOME | End: 2022-04-26
Attending: UROLOGY | Admitting: UROLOGY

## 2022-04-26 VITALS
HEART RATE: 86 BPM | HEIGHT: 69 IN | OXYGEN SATURATION: 99 % | BODY MASS INDEX: 37.06 KG/M2 | DIASTOLIC BLOOD PRESSURE: 79 MMHG | SYSTOLIC BLOOD PRESSURE: 174 MMHG | WEIGHT: 250.22 LBS | RESPIRATION RATE: 18 BRPM | TEMPERATURE: 97 F

## 2022-04-26 DIAGNOSIS — C67.8 MALIGNANT NEOPLASM OF OVERLAPPING SITES OF BLADDER: ICD-10-CM

## 2022-04-26 PROBLEM — I10 HYPERTENSION, ESSENTIAL: Status: ACTIVE | Noted: 2022-04-26

## 2022-04-26 PROBLEM — I48.21 PERMANENT ATRIAL FIBRILLATION: Status: ACTIVE | Noted: 2022-04-26

## 2022-04-26 PROBLEM — E78.2 HYPERLIPEMIA, MIXED: Status: ACTIVE | Noted: 2022-04-26

## 2022-04-26 LAB
GLUCOSE BLDC GLUCOMTR-MCNC: 129 MG/DL (ref 70–99)
GLUCOSE BLDC GLUCOMTR-MCNC: 162 MG/DL (ref 70–99)

## 2022-04-26 PROCEDURE — 88307 TISSUE EXAM BY PATHOLOGIST: CPT | Performed by: UROLOGY

## 2022-04-26 PROCEDURE — 0 IOPAMIDOL PER 1 ML: Performed by: UROLOGY

## 2022-04-26 PROCEDURE — 74420 UROGRAPHY RTRGR +-KUB: CPT

## 2022-04-26 PROCEDURE — 25010000002 LEVOFLOXACIN PER 250 MG: Performed by: UROLOGY

## 2022-04-26 PROCEDURE — 82962 GLUCOSE BLOOD TEST: CPT

## 2022-04-26 PROCEDURE — 52235 CYSTOSCOPY AND TREATMENT: CPT | Performed by: UROLOGY

## 2022-04-26 PROCEDURE — 25010000002 PROPOFOL 10 MG/ML EMULSION: Performed by: NURSE ANESTHETIST, CERTIFIED REGISTERED

## 2022-04-26 PROCEDURE — 25010000002 ONDANSETRON PER 1 MG: Performed by: NURSE ANESTHETIST, CERTIFIED REGISTERED

## 2022-04-26 PROCEDURE — 25010000002 FENTANYL CITRATE (PF) 50 MCG/ML SOLUTION: Performed by: NURSE ANESTHETIST, CERTIFIED REGISTERED

## 2022-04-26 PROCEDURE — C1758 CATHETER, URETERAL: HCPCS | Performed by: UROLOGY

## 2022-04-26 PROCEDURE — 25010000002 DEXAMETHASONE PER 1 MG: Performed by: NURSE ANESTHETIST, CERTIFIED REGISTERED

## 2022-04-26 PROCEDURE — 25010000002 MIDAZOLAM PER 1 MG: Performed by: ANESTHESIOLOGY

## 2022-04-26 RX ORDER — MAGNESIUM HYDROXIDE 1200 MG/15ML
LIQUID ORAL AS NEEDED
Status: DISCONTINUED | OUTPATIENT
Start: 2022-04-26 | End: 2022-04-26 | Stop reason: HOSPADM

## 2022-04-26 RX ORDER — LEVOFLOXACIN 5 MG/ML
500 INJECTION, SOLUTION INTRAVENOUS ONCE
Status: COMPLETED | OUTPATIENT
Start: 2022-04-26 | End: 2022-04-26

## 2022-04-26 RX ORDER — SODIUM CHLORIDE 0.9 % (FLUSH) 0.9 %
10 SYRINGE (ML) INJECTION EVERY 12 HOURS SCHEDULED
Status: DISCONTINUED | OUTPATIENT
Start: 2022-04-26 | End: 2022-04-26 | Stop reason: HOSPADM

## 2022-04-26 RX ORDER — DEXAMETHASONE SODIUM PHOSPHATE 4 MG/ML
INJECTION, SOLUTION INTRA-ARTICULAR; INTRALESIONAL; INTRAMUSCULAR; INTRAVENOUS; SOFT TISSUE AS NEEDED
Status: DISCONTINUED | OUTPATIENT
Start: 2022-04-26 | End: 2022-04-26 | Stop reason: SURG

## 2022-04-26 RX ORDER — LIDOCAINE HYDROCHLORIDE 20 MG/ML
INJECTION, SOLUTION EPIDURAL; INFILTRATION; INTRACAUDAL; PERINEURAL AS NEEDED
Status: DISCONTINUED | OUTPATIENT
Start: 2022-04-26 | End: 2022-04-26 | Stop reason: SURG

## 2022-04-26 RX ORDER — ONDANSETRON 2 MG/ML
4 INJECTION INTRAMUSCULAR; INTRAVENOUS ONCE AS NEEDED
Status: DISCONTINUED | OUTPATIENT
Start: 2022-04-26 | End: 2022-04-26 | Stop reason: HOSPADM

## 2022-04-26 RX ORDER — FENTANYL CITRATE 50 UG/ML
INJECTION, SOLUTION INTRAMUSCULAR; INTRAVENOUS AS NEEDED
Status: DISCONTINUED | OUTPATIENT
Start: 2022-04-26 | End: 2022-04-26 | Stop reason: SURG

## 2022-04-26 RX ORDER — SODIUM CHLORIDE 9 MG/ML
100 INJECTION, SOLUTION INTRAVENOUS CONTINUOUS
Status: DISCONTINUED | OUTPATIENT
Start: 2022-04-26 | End: 2022-04-26 | Stop reason: HOSPADM

## 2022-04-26 RX ORDER — SODIUM CHLORIDE 0.9 % (FLUSH) 0.9 %
10 SYRINGE (ML) INJECTION AS NEEDED
Status: DISCONTINUED | OUTPATIENT
Start: 2022-04-26 | End: 2022-04-26 | Stop reason: HOSPADM

## 2022-04-26 RX ORDER — MIDAZOLAM HYDROCHLORIDE 1 MG/ML
1 INJECTION INTRAMUSCULAR; INTRAVENOUS ONCE
Status: COMPLETED | OUTPATIENT
Start: 2022-04-26 | End: 2022-04-26

## 2022-04-26 RX ORDER — ACETAMINOPHEN 500 MG
1000 TABLET ORAL ONCE
Status: COMPLETED | OUTPATIENT
Start: 2022-04-26 | End: 2022-04-26

## 2022-04-26 RX ORDER — ONDANSETRON 2 MG/ML
INJECTION INTRAMUSCULAR; INTRAVENOUS AS NEEDED
Status: DISCONTINUED | OUTPATIENT
Start: 2022-04-26 | End: 2022-04-26 | Stop reason: SURG

## 2022-04-26 RX ORDER — PROPOFOL 10 MG/ML
VIAL (ML) INTRAVENOUS AS NEEDED
Status: DISCONTINUED | OUTPATIENT
Start: 2022-04-26 | End: 2022-04-26 | Stop reason: SURG

## 2022-04-26 RX ORDER — SUCCINYLCHOLINE/SOD CL,ISO/PF 100 MG/5ML
SYRINGE (ML) INTRAVENOUS AS NEEDED
Status: DISCONTINUED | OUTPATIENT
Start: 2022-04-26 | End: 2022-04-26 | Stop reason: SURG

## 2022-04-26 RX ORDER — ACETAMINOPHEN 325 MG/1
650 TABLET ORAL ONCE
Status: DISCONTINUED | OUTPATIENT
Start: 2022-04-26 | End: 2022-04-26 | Stop reason: HOSPADM

## 2022-04-26 RX ORDER — OXYCODONE HYDROCHLORIDE 5 MG/1
5 TABLET ORAL
Status: DISCONTINUED | OUTPATIENT
Start: 2022-04-26 | End: 2022-04-26 | Stop reason: HOSPADM

## 2022-04-26 RX ORDER — MEPERIDINE HYDROCHLORIDE 25 MG/ML
12.5 INJECTION INTRAMUSCULAR; INTRAVENOUS; SUBCUTANEOUS
Status: DISCONTINUED | OUTPATIENT
Start: 2022-04-26 | End: 2022-04-26 | Stop reason: HOSPADM

## 2022-04-26 RX ORDER — PROMETHAZINE HYDROCHLORIDE 12.5 MG/1
12.5 TABLET ORAL ONCE AS NEEDED
Status: DISCONTINUED | OUTPATIENT
Start: 2022-04-26 | End: 2022-04-26 | Stop reason: HOSPADM

## 2022-04-26 RX ORDER — IBUPROFEN 600 MG/1
600 TABLET ORAL EVERY 6 HOURS PRN
Status: DISCONTINUED | OUTPATIENT
Start: 2022-04-26 | End: 2022-04-26 | Stop reason: HOSPADM

## 2022-04-26 RX ORDER — SODIUM CHLORIDE, SODIUM LACTATE, POTASSIUM CHLORIDE, CALCIUM CHLORIDE 600; 310; 30; 20 MG/100ML; MG/100ML; MG/100ML; MG/100ML
9 INJECTION, SOLUTION INTRAVENOUS CONTINUOUS PRN
Status: DISCONTINUED | OUTPATIENT
Start: 2022-04-26 | End: 2022-04-26 | Stop reason: HOSPADM

## 2022-04-26 RX ORDER — ROCURONIUM BROMIDE 10 MG/ML
INJECTION, SOLUTION INTRAVENOUS AS NEEDED
Status: DISCONTINUED | OUTPATIENT
Start: 2022-04-26 | End: 2022-04-26 | Stop reason: SURG

## 2022-04-26 RX ORDER — PROMETHAZINE HYDROCHLORIDE 12.5 MG/1
25 TABLET ORAL ONCE AS NEEDED
Status: DISCONTINUED | OUTPATIENT
Start: 2022-04-26 | End: 2022-04-26 | Stop reason: HOSPADM

## 2022-04-26 RX ORDER — PROMETHAZINE HYDROCHLORIDE 25 MG/1
25 SUPPOSITORY RECTAL ONCE AS NEEDED
Status: DISCONTINUED | OUTPATIENT
Start: 2022-04-26 | End: 2022-04-26 | Stop reason: HOSPADM

## 2022-04-26 RX ADMIN — ACETAMINOPHEN 1000 MG: 500 TABLET ORAL at 10:20

## 2022-04-26 RX ADMIN — LEVOFLOXACIN 500 MG: 5 INJECTION, SOLUTION INTRAVENOUS at 14:13

## 2022-04-26 RX ADMIN — PROPOFOL 200 MG: 10 INJECTION, EMULSION INTRAVENOUS at 14:18

## 2022-04-26 RX ADMIN — ONDANSETRON 4 MG: 2 INJECTION INTRAMUSCULAR; INTRAVENOUS at 14:38

## 2022-04-26 RX ADMIN — FENTANYL CITRATE 50 MCG: 50 INJECTION, SOLUTION INTRAMUSCULAR; INTRAVENOUS at 14:25

## 2022-04-26 RX ADMIN — MIDAZOLAM HYDROCHLORIDE 1 MG: 1 INJECTION, SOLUTION INTRAMUSCULAR; INTRAVENOUS at 14:00

## 2022-04-26 RX ADMIN — ROCURONIUM BROMIDE 10 MG: 10 INJECTION INTRAVENOUS at 14:18

## 2022-04-26 RX ADMIN — DEXAMETHASONE SODIUM PHOSPHATE 4 MG: 4 INJECTION, SOLUTION INTRA-ARTICULAR; INTRALESIONAL; INTRAMUSCULAR; INTRAVENOUS; SOFT TISSUE at 14:38

## 2022-04-26 RX ADMIN — Medication 100 MG: at 14:18

## 2022-04-26 RX ADMIN — SODIUM CHLORIDE, POTASSIUM CHLORIDE, SODIUM LACTATE AND CALCIUM CHLORIDE 9 ML/HR: 600; 310; 30; 20 INJECTION, SOLUTION INTRAVENOUS at 10:20

## 2022-04-26 RX ADMIN — FENTANYL CITRATE 50 MCG: 50 INJECTION, SOLUTION INTRAMUSCULAR; INTRAVENOUS at 14:18

## 2022-04-26 RX ADMIN — LIDOCAINE HYDROCHLORIDE 100 MG: 20 INJECTION, SOLUTION EPIDURAL; INFILTRATION; INTRACAUDAL; PERINEURAL at 14:18

## 2022-04-26 NOTE — ANESTHESIA POSTPROCEDURE EVALUATION
Patient: Toney Neri    Procedure Summary     Date: 04/26/22 Room / Location: [facility] OR 07 / [facility] MAIN OR    Anesthesia Start: 1412 Anesthesia Stop: 1512    Procedures:       CYSTOSCOPY TRANSURETHRAL RESECTION OF BLADDER TUMOR (N/A Bladder)      CYSTOSCOPY RETROGRADE PYELOGRAM (Bilateral ) Diagnosis:       Malignant neoplasm of overlapping sites of bladder (HCC)      (Malignant neoplasm of overlapping sites of bladder (HCC) [C67.8])    Surgeons: Yoli Le MD Provider: Mert Cobos MD    Anesthesia Type: general ASA Status: 4          Anesthesia Type: general    Vitals  Vitals Value Taken Time   /71 04/26/22 1515   Temp 36.2 °C (97.2 °F) 04/26/22 1510   Pulse 79 04/26/22 1518   Resp 15 04/26/22 1515   SpO2 92 % 04/26/22 1518   Vitals shown include unvalidated device data.        Post Anesthesia Care and Evaluation    Patient location during evaluation: bedside  Patient participation: complete - patient participated  Level of consciousness: awake, responsive to verbal stimuli, responsive to light touch, responsive to noxious stimuli, responsive to painful stimuli and responsive to physical stimuli  Pain score: 2  Pain management: adequate  Airway patency: patent  Anesthetic complications: No anesthetic complications  PONV Status: none  Cardiovascular status: acceptable and stable  Respiratory status: acceptable and nasal cannula  Hydration status: acceptable    Comments: An Anesthesiologist personally participated in the most demanding procedures (including induction and emergence if applicable) in the anesthesia plan, monitored the course of anesthesia administration at frequent intervals and remained physically present and available for immediate diagnosis and treatment of emergencies.

## 2022-04-26 NOTE — ANESTHESIA PREPROCEDURE EVALUATION
Anesthesia Evaluation     Patient summary reviewed and Nursing notes reviewed   no history of anesthetic complications:  NPO Solid Status: > 8 hours  NPO Liquid Status: > 2 hours           Airway   Mallampati: II  TM distance: >3 FB  Neck ROM: full  No difficulty expected  Dental    (+) upper dentures and lower dentures    Pulmonary - negative pulmonary ROS and normal exam    breath sounds clear to auscultation  Cardiovascular   Exercise tolerance: good (4-7 METS)    Rhythm: irregular  Rate: normal    (+) hypertension, past MI , CAD, dysrhythmias Atrial Fib,     ROS comment:  PT <4METS, CHRONIC SOA, DECREASED MOBILITY; ECHO 2/20 MILD MR, EF 55%, LEFT VENTRICAL HYPERTROPHY, 2/20 stress test was negative for ischemia. PERM. A-FIB TO F/U IN 6MTHS WITH CARDS.     Neuro/Psych  (+) TIA,    GI/Hepatic/Renal/Endo    (+)  GERD,  diabetes mellitus,     Musculoskeletal     Abdominal    Substance History - negative use     OB/GYN negative ob/gyn ROS         Other   arthritis,                      Anesthesia Plan    ASA 4     general       Anesthetic plan, all risks, benefits, and alternatives have been provided, discussed and informed consent has been obtained with: patient.        CODE STATUS:

## 2022-04-27 ENCOUNTER — HOSPITAL ENCOUNTER (EMERGENCY)
Facility: HOSPITAL | Age: 87
Discharge: HOME | End: 2022-04-27
Attending: EMERGENCY MEDICINE | Admitting: EMERGENCY MEDICINE

## 2022-04-27 ENCOUNTER — TELEPHONE (OUTPATIENT)
Dept: UROLOGY | Facility: CLINIC | Age: 87
End: 2022-04-27

## 2022-04-27 VITALS
DIASTOLIC BLOOD PRESSURE: 91 MMHG | OXYGEN SATURATION: 94 % | TEMPERATURE: 97.9 F | BODY MASS INDEX: 39.31 KG/M2 | RESPIRATION RATE: 18 BRPM | SYSTOLIC BLOOD PRESSURE: 147 MMHG | HEART RATE: 70 BPM | WEIGHT: 265.43 LBS | HEIGHT: 69 IN

## 2022-04-27 DIAGNOSIS — R30.0 DYSURIA: ICD-10-CM

## 2022-04-27 DIAGNOSIS — R42 VERTIGO: Primary | ICD-10-CM

## 2022-04-27 LAB
ALBUMIN SERPL-MCNC: 4.2 G/DL (ref 3.5–5.2)
ALBUMIN/GLOB SERPL: 1.8 G/DL
ALP SERPL-CCNC: 48 U/L (ref 39–117)
ALT SERPL W P-5'-P-CCNC: 20 U/L (ref 1–41)
ANION GAP SERPL CALCULATED.3IONS-SCNC: 13.6 MMOL/L (ref 5–15)
AST SERPL-CCNC: 20 U/L (ref 1–40)
BACTERIA UR QL AUTO: ABNORMAL /HPF
BASOPHILS # BLD AUTO: 0.03 10*3/MM3 (ref 0–0.2)
BASOPHILS NFR BLD AUTO: 0.2 % (ref 0–1.5)
BILIRUB SERPL-MCNC: 0.6 MG/DL (ref 0–1.2)
BILIRUB UR QL STRIP: NEGATIVE
BUN SERPL-MCNC: 22 MG/DL (ref 8–23)
BUN/CREAT SERPL: 13.8 (ref 7–25)
CALCIUM SPEC-SCNC: 9.2 MG/DL (ref 8.6–10.5)
CHLORIDE SERPL-SCNC: 98 MMOL/L (ref 98–107)
CLARITY UR: CLEAR
CO2 SERPL-SCNC: 24.4 MMOL/L (ref 22–29)
COLOR UR: YELLOW
CREAT SERPL-MCNC: 1.59 MG/DL (ref 0.76–1.27)
DEPRECATED RDW RBC AUTO: 47 FL (ref 37–54)
EGFRCR SERPLBLD CKD-EPI 2021: 42 ML/MIN/1.73
EOSINOPHIL # BLD AUTO: 0.07 10*3/MM3 (ref 0–0.4)
EOSINOPHIL NFR BLD AUTO: 0.6 % (ref 0.3–6.2)
ERYTHROCYTE [DISTWIDTH] IN BLOOD BY AUTOMATED COUNT: 13.2 % (ref 12.3–15.4)
GLOBULIN UR ELPH-MCNC: 2.4 GM/DL
GLUCOSE SERPL-MCNC: 123 MG/DL (ref 65–99)
GLUCOSE UR STRIP-MCNC: NEGATIVE MG/DL
HCT VFR BLD AUTO: 38.3 % (ref 37.5–51)
HGB BLD-MCNC: 13.1 G/DL (ref 13–17.7)
HGB UR QL STRIP.AUTO: ABNORMAL
HYALINE CASTS UR QL AUTO: ABNORMAL /LPF
IMM GRANULOCYTES # BLD AUTO: 0.02 10*3/MM3 (ref 0–0.05)
IMM GRANULOCYTES NFR BLD AUTO: 0.2 % (ref 0–0.5)
KETONES UR QL STRIP: NEGATIVE
LEUKOCYTE ESTERASE UR QL STRIP.AUTO: ABNORMAL
LYMPHOCYTES # BLD AUTO: 3.15 10*3/MM3 (ref 0.7–3.1)
LYMPHOCYTES NFR BLD AUTO: 24.9 % (ref 19.6–45.3)
MCH RBC QN AUTO: 33.2 PG (ref 26.6–33)
MCHC RBC AUTO-ENTMCNC: 34.2 G/DL (ref 31.5–35.7)
MCV RBC AUTO: 97.2 FL (ref 79–97)
MONOCYTES # BLD AUTO: 1.07 10*3/MM3 (ref 0.1–0.9)
MONOCYTES NFR BLD AUTO: 8.5 % (ref 5–12)
NEUTROPHILS NFR BLD AUTO: 65.6 % (ref 42.7–76)
NEUTROPHILS NFR BLD AUTO: 8.31 10*3/MM3 (ref 1.7–7)
NITRITE UR QL STRIP: NEGATIVE
NRBC BLD AUTO-RTO: 0 /100 WBC (ref 0–0.2)
PH UR STRIP.AUTO: <=5 [PH] (ref 5–8)
PLATELET # BLD AUTO: 182 10*3/MM3 (ref 140–450)
PMV BLD AUTO: 11.3 FL (ref 6–12)
POTASSIUM SERPL-SCNC: 3.2 MMOL/L (ref 3.5–5.2)
PROT SERPL-MCNC: 6.6 G/DL (ref 6–8.5)
PROT UR QL STRIP: NEGATIVE
RBC # BLD AUTO: 3.94 10*6/MM3 (ref 4.14–5.8)
RBC # UR STRIP: ABNORMAL /HPF
REF LAB TEST METHOD: ABNORMAL
SODIUM SERPL-SCNC: 136 MMOL/L (ref 136–145)
SP GR UR STRIP: 1.01 (ref 1–1.03)
SQUAMOUS #/AREA URNS HPF: ABNORMAL /HPF
UROBILINOGEN UR QL STRIP: ABNORMAL
WBC # UR STRIP: ABNORMAL /HPF
WBC NRBC COR # BLD: 12.65 10*3/MM3 (ref 3.4–10.8)

## 2022-04-27 PROCEDURE — 81001 URINALYSIS AUTO W/SCOPE: CPT | Performed by: NURSE PRACTITIONER

## 2022-04-27 PROCEDURE — 99284 EMERGENCY DEPT VISIT MOD MDM: CPT

## 2022-04-27 PROCEDURE — 80053 COMPREHEN METABOLIC PANEL: CPT

## 2022-04-27 PROCEDURE — 87086 URINE CULTURE/COLONY COUNT: CPT | Performed by: NURSE PRACTITIONER

## 2022-04-27 PROCEDURE — 93010 ELECTROCARDIOGRAM REPORT: CPT | Performed by: INTERNAL MEDICINE

## 2022-04-27 PROCEDURE — 36415 COLL VENOUS BLD VENIPUNCTURE: CPT

## 2022-04-27 PROCEDURE — 93005 ELECTROCARDIOGRAM TRACING: CPT

## 2022-04-27 PROCEDURE — 85025 COMPLETE CBC W/AUTO DIFF WBC: CPT

## 2022-04-27 RX ORDER — MECLIZINE HYDROCHLORIDE 25 MG/1
25 TABLET ORAL ONCE
Status: COMPLETED | OUTPATIENT
Start: 2022-04-27 | End: 2022-04-27

## 2022-04-27 RX ORDER — MECLIZINE HYDROCHLORIDE 25 MG/1
25 TABLET ORAL 3 TIMES DAILY PRN
Qty: 15 TABLET | Refills: 0 | Status: SHIPPED | OUTPATIENT
Start: 2022-04-27

## 2022-04-27 RX ADMIN — MECLIZINE HYDROCHLORIDE 25 MG: 25 TABLET ORAL at 21:38

## 2022-04-27 NOTE — TELEPHONE ENCOUNTER
Pt had surgery yesterday. Son called to report that he is having chills, dizziness and hasnt voided much since this morning in spite of pushing fluids. I informed Dr. Le and at her instruction I advised the son to take him to the ER.

## 2022-04-28 LAB
BACTERIA SPEC AEROBE CULT: NO GROWTH
CYTO UR: NORMAL
LAB AP CASE REPORT: NORMAL
LAB AP CLINICAL INFORMATION: NORMAL
PATH REPORT.FINAL DX SPEC: NORMAL
PATH REPORT.GROSS SPEC: NORMAL

## 2022-05-01 LAB — QT INTERVAL: 423 MS

## 2022-05-04 ENCOUNTER — OFFICE VISIT (OUTPATIENT)
Dept: UROLOGY | Facility: CLINIC | Age: 87
End: 2022-05-04

## 2022-05-04 VITALS
WEIGHT: 249.6 LBS | BODY MASS INDEX: 36.97 KG/M2 | SYSTOLIC BLOOD PRESSURE: 107 MMHG | OXYGEN SATURATION: 94 % | DIASTOLIC BLOOD PRESSURE: 58 MMHG | HEART RATE: 81 BPM | HEIGHT: 69 IN

## 2022-05-04 DIAGNOSIS — C67.8 MALIGNANT NEOPLASM OF OVERLAPPING SITES OF BLADDER: Primary | ICD-10-CM

## 2022-05-04 LAB
BILIRUB BLD-MCNC: ABNORMAL MG/DL
CLARITY, POC: CLEAR
COLOR UR: YELLOW
EXPIRATION DATE: ABNORMAL
GLUCOSE UR STRIP-MCNC: NEGATIVE MG/DL
KETONES UR QL: NEGATIVE
LEUKOCYTE EST, POC: ABNORMAL
Lab: ABNORMAL
NITRITE UR-MCNC: NEGATIVE MG/ML
PH UR: 6 [PH] (ref 5–8)
PROT UR STRIP-MCNC: ABNORMAL MG/DL
RBC # UR STRIP: ABNORMAL /UL
SP GR UR: 1.02 (ref 1–1.03)
UROBILINOGEN UR QL: NORMAL

## 2022-05-04 PROCEDURE — 99213 OFFICE O/P EST LOW 20 MIN: CPT | Performed by: UROLOGY

## 2022-05-04 PROCEDURE — 81003 URINALYSIS AUTO W/O SCOPE: CPT | Performed by: UROLOGY

## 2022-05-04 RX ORDER — NIFEDIPINE 90 MG/1
TABLET, FILM COATED ORAL
COMMUNITY
Start: 2022-04-20 | End: 2022-11-07

## 2022-05-04 NOTE — PROGRESS NOTES
"Chief Complaint  Bladder Cancer     History of Present Illness    Mr. Neri is here for follow-up for TURBT. Unfortunately, he had some new tumors on the anterior aspect of his bladder. They were low grade papillary urothelial carcinoma superficial.    The patient reports that he is doing well. He states that he has pain in his groin a lot. He states that he went to the emergency room the other night for dizziness and that his that his blood pressure has been low. He states that he was given pills for vertigo and he thinks they might have helped a little bit.      Subjective          Toney Neri presents to [facility] UROLOGY      Objective   Vital Signs:   /58   Pulse 81   Ht 175.3 cm (69\")   Wt 113 kg (249 lb 9.6 oz)   SpO2 94%   BMI 36.86 kg/m²     Physical Exam  Vitals and nursing note reviewed.   Constitutional:       Appearance: Normal appearance. He is well-developed.   Pulmonary:      Effort: Pulmonary effort is normal.      Breath sounds: Normal air entry.   Neurological:      Mental Status: He is alert and oriented to person, place, and time.      Motor: Motor function is intact.   Psychiatric:         Mood and Affect: Mood normal.         Behavior: Behavior normal.          Result Review :   The following data was reviewed by: Yoli Le MD on 05/04/2022:    Results for orders placed or performed in visit on 05/04/22   POC Urinalysis Dipstick, Automated    Specimen: Urine   Result Value Ref Range    Color Yellow Yellow, Straw, Dark Yellow, Lynne    Clarity, UA Clear Clear    Specific Gravity  1.020 1.005 - 1.030    pH, Urine 6.0 5.0 - 8.0    Leukocytes Small (1+) (A) Negative    Nitrite, UA Negative Negative    Protein,  mg/dL (A) Negative mg/dL    Glucose, UA Negative Negative, 1000 mg/dL (3+) mg/dL    Ketones, UA Negative Negative    Urobilinogen, UA Normal Normal    Bilirubin Small (1+) (A) Negative    Blood, UA Large (A) Negative    Lot Number [id number] "     Expiration Date 2-2,023        Data reviewed: Pathology from TURBT:   Final Diagnosis   Urinary bladder, tumor, transurethral resection:               - Low grade papillary urothelial carcinoma, noninvasive               - Muscularis propria present, without invasion    Electronically signed by Genny Marquez DO on 4/28/2022 at 1332   Gross Description    1. Urinary Bladder.  Received in formalin labeled bladder tumor are 4 tan-white irregular soft tissue fragments ranging in size from 0.4 cm to 0.6 cm, which are submitted in toto as 1A.            Assessment and Plan    Diagnoses and all orders for this visit:    1. Malignant neoplasm of overlapping sites of bladder (HCC) (Primary)  Assessment & Plan:  He has had recurrence of bladder cancer.   We will do a cystoscopy in the office in 3 months.      Orders:  -     POC Urinalysis Dipstick, Automated          Follow Up       Return in about 3 months (around 8/4/2022) for Cystoscopy.  Patient was given instructions and counseling regarding his condition or for health maintenance advice. Please see specific information pulled into the AVS if appropriate.     Transcribed from ambient dictation for Yoli Le MD by JOEL STEVENSON.  05/04/22   12:38 EDT    Patient verbalized consent to the visit recording.

## 2022-05-18 ENCOUNTER — OFFICE VISIT (OUTPATIENT)
Dept: CARDIOLOGY | Facility: CLINIC | Age: 87
End: 2022-05-18

## 2022-05-18 VITALS
SYSTOLIC BLOOD PRESSURE: 96 MMHG | HEART RATE: 90 BPM | WEIGHT: 252 LBS | DIASTOLIC BLOOD PRESSURE: 48 MMHG | HEIGHT: 69 IN | BODY MASS INDEX: 37.33 KG/M2

## 2022-05-18 DIAGNOSIS — I10 HYPERTENSION, ESSENTIAL: ICD-10-CM

## 2022-05-18 DIAGNOSIS — E78.2 HYPERLIPEMIA, MIXED: ICD-10-CM

## 2022-05-18 DIAGNOSIS — I48.21 PERMANENT ATRIAL FIBRILLATION: Primary | ICD-10-CM

## 2022-05-18 PROBLEM — K21.9 GASTROESOPHAGEAL REFLUX DISEASE: Status: ACTIVE | Noted: 2017-08-18

## 2022-05-18 PROBLEM — E11.9 TYPE 2 DIABETES MELLITUS (HCC): Status: ACTIVE | Noted: 2017-08-18

## 2022-05-18 PROBLEM — G47.30 SLEEP APNEA: Status: ACTIVE | Noted: 2022-05-18

## 2022-05-18 PROBLEM — C67.9 UROTHELIAL CARCINOMA OF BLADDER: Status: ACTIVE | Noted: 2021-04-22

## 2022-05-18 PROBLEM — N40.0 BENIGN PROSTATIC HYPERPLASIA: Status: ACTIVE | Noted: 2017-08-18

## 2022-05-18 PROBLEM — M19.90 ARTHRITIS: Status: ACTIVE | Noted: 2017-08-18

## 2022-05-18 PROBLEM — K42.9 UMBILICAL HERNIA: Status: ACTIVE | Noted: 2022-05-18

## 2022-05-18 PROBLEM — J30.9 ALLERGIC RHINITIS: Status: ACTIVE | Noted: 2022-05-18

## 2022-05-18 PROCEDURE — 99214 OFFICE O/P EST MOD 30 MIN: CPT | Performed by: NURSE PRACTITIONER

## 2022-05-18 RX ORDER — HYDROCHLOROTHIAZIDE 25 MG/1
12.5 TABLET ORAL EVERY MORNING
Qty: 45 TABLET | Refills: 0 | Status: SHIPPED | OUTPATIENT
Start: 2022-05-18 | End: 2022-12-16 | Stop reason: SDUPTHER

## 2022-05-18 NOTE — PROGRESS NOTES
"Chief Complaint  ER visit (For Vertigo), Atrial Fibrillation, Hypertension, and Hyperlipidemia    Subjective            History of Present Illness  Toney Neri is an 86-year-old white/ male patient who presents to the office today for follow-up.  He has permanent atrial fibrillation, hypertension, and hyperlipidemia.  He reports that he has been having some dizzy episodes last few weeks and even went to the emergency room for it.  He also reports some associated \"low\" blood pressure readings at home.  He reports compliance with all his medications.  He denies any chest pain, shortness of breath, lightheadedness/dizziness, palpitations, or edema.    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         ALLERGY  No Known Allergies       SURGICALHX  Past Surgical History:   Procedure Laterality Date   • ANKLE SURGERY Right 1989    ankle repair   • BICEPS TENDON REPAIR Right    • COLONOSCOPY  2012   • CYSTOSCOPY     • CYSTOSCOPY RETROGRADE PYELOGRAM Bilateral 4/26/2022    Procedure: CYSTOSCOPY RETROGRADE PYELOGRAM;  Surgeon: Yoli Le MD;  Location: [facility];  Service: Urology;  Laterality: Bilateral;   • ENDOSCOPY  2018 2020   • GALLBLADDER SURGERY     • ROTATOR CUFF REPAIR Bilateral 1988 1990   • TONSILLECTOMY     • TRANSURETHRAL RESECTION OF BLADDER TUMOR N/A 08/19/2021    Procedure: CYSTOSCOPY TRANSURETHRAL RESECTION OF BLADDER TUMOR;  Surgeon: Yoli Le MD;  Location: [facility] OR;  Service: Urology;  Laterality: N/A;   • " TRANSURETHRAL RESECTION OF BLADDER TUMOR N/A 4/26/2022    Procedure: CYSTOSCOPY TRANSURETHRAL RESECTION OF BLADDER TUMOR;  Surgeon: Yoli Le MD;  Location: [facility] OR;  Service: Urology;  Laterality: N/A;          SOC  Social History     Socioeconomic History   • Marital status:    Tobacco Use   • Smoking status: Former Smoker     Types: Cigarettes   • Smokeless tobacco: Never Used   • Tobacco comment: smoked 21-30 years   Vaping Use   • Vaping Use: Never used   Substance and Sexual Activity   • Alcohol use: Yes     Comment: rarely drinks less than 1 drink per day has been drinking for 31 or more years   • Drug use: Never   • Sexual activity: Defer         FAMHX  Family History   Problem Relation Age of Onset   • Heart disease Mother    • Arthritis Mother    • Prostate cancer Father    • Arthritis Father    • Prostate cancer Son    • Diabetes Other    • Malig Hyperthermia Neg Hx           MEDSIGONLY  Current Outpatient Medications on File Prior to Visit   Medication Sig   • acetaminophen (TYLENOL) 325 MG tablet Take 325 mg by mouth Daily.   • Adalat CC 90 MG 24 hr tablet    • albuterol sulfate  (90 Base) MCG/ACT inhaler Inhale 1 puff Daily As Needed.   • apixaban (Eliquis) 5 MG tablet tablet Take 5 mg by mouth 2 (two) times a day. LAST DOSE 4/23/22 P.M.   • Aspirin Low Dose 81 MG EC tablet Take 81 mg by mouth Daily. NO STOP NEEDED ON ASA PER DR. LE   • atenolol (TENORMIN) 50 MG tablet Take 50 mg by mouth Every Morning.   • carboxymethylcellulose (REFRESH PLUS) 0.5 % solution 1 drop 4 (Four) Times a Day.   • finasteride (PROSCAR) 5 MG tablet Take 1 tablet by mouth Daily for 360 days. (Patient taking differently: Take 5 mg by mouth Every Morning.)   • fluticasone (FLONASE) 50 MCG/ACT nasal spray 1 spray by Each Nare route Every Morning.   • hydroCHLOROthiazide (HYDRODIURIL) 25 MG tablet Take 25 mg by mouth Every Morning. INST PER ANESTHESIA PROTOCOL   • loratadine (CLARITIN) 10 MG  "tablet Take 10 mg by mouth Every Morning.   • Magnesium Oxide 400 (240 Mg) MG tablet Take 400 mg by mouth Daily.   • meclizine (ANTIVERT) 25 MG tablet Take 1 tablet by mouth 3 (Three) Times a Day As Needed for Dizziness.   • melatonin 3 MG tablet Take 6 mg by mouth every night at bedtime.   • metFORMIN (GLUCOPHAGE) 1000 MG tablet Take 1,000 mg by mouth 2 (Two) Times a Day. INST PER ANESTHESIA PROTOCOL   • multivitamin with minerals tablet tablet Take 1 tablet by mouth Daily.   • NIFEdipine XL (PROCARDIA XL) 90 MG 24 hr tablet Take 90 mg by mouth Every Morning.   • pantoprazole (PROTONIX) 40 MG EC tablet Take 40 mg by mouth Every Morning.   • potassium chloride 10 MEQ CR tablet Take 10 mEq by mouth Daily.   • simvastatin (ZOCOR) 40 MG tablet Take 40 mg by mouth Every Night.   • tamsulosin (FLOMAX) 0.4 MG capsule 24 hr capsule Take 1 capsule by mouth 2 (Two) Times a Day.   • terazosin (HYTRIN) 10 MG capsule Take 10 mg by mouth Every Night.   • vitamin C (ASCORBIC ACID) 250 MG tablet Take 250 mg by mouth Daily.     No current facility-administered medications on file prior to visit.         Objective   BP 96/48 (BP Location: Right arm, Patient Position: Sitting)   Pulse 90   Ht 175.3 cm (69\")   Wt 114 kg (252 lb)   BMI 37.21 kg/m²       Physical Exam  Constitutional:       Appearance: He is obese.   HENT:      Head: Normocephalic.   Neck:      Vascular: No carotid bruit.   Cardiovascular:      Rate and Rhythm: Normal rate. Rhythm irregular.      Pulses: Normal pulses.      Heart sounds: Normal heart sounds. No murmur heard.  Pulmonary:      Effort: Pulmonary effort is normal.      Breath sounds: Normal breath sounds.   Musculoskeletal:      Cervical back: Neck supple.      Right lower leg: No edema.      Left lower leg: No edema.   Skin:     General: Skin is dry.      Capillary Refill: Capillary refill takes less than 2 seconds.   Neurological:      Mental Status: He is alert and oriented to person, place, and " time.   Psychiatric:         Behavior: Behavior normal.       Result Review :   The following data was reviewed by: NINO Velarde on 05/18/2022:  No results found for: PROBNP  CMP    CMP 4/27/22   Glucose 123 (A)   BUN 22   Creatinine 1.59 (A)   eGFR Non African Am    Sodium 136   Potassium 3.2 (A)   Chloride 98   Calcium 9.2   Albumin 4.20   Total Bilirubin 0.6   Alkaline Phosphatase 48   AST (SGOT) 20   ALT (SGPT) 20   (A) Abnormal value            CBC w/diff    CBC w/Diff 4/27/22   WBC 12.65 (A)   RBC 3.94 (A)   Hemoglobin 13.1   Hematocrit 38.3   MCV 97.2 (A)   MCH 33.2 (A)   MCHC 34.2   RDW 13.2   Platelets 182   Neutrophil Rel % 65.6   Immature Granulocyte Rel % 0.2   Lymphocyte Rel % 24.9   Monocyte Rel % 8.5   Eosinophil Rel % 0.6   Basophil Rel % 0.2   (A) Abnormal value             No results found for: TSH   No results found for: FREET4   No results found for: DDIMERQUANT  Magnesium   Date Value Ref Range Status   07/19/2021 1.6 1.6 - 2.4 mg/dL Final      No results found for: DIGOXIN   Lab Results   Component Value Date    TROPONINT <0.01 07/17/2020 4/13/2022 labs  Glucose 174  BUN 13  Creatinine 1.36  EGFR 51  Sodium 142  Potassium 3.7  Calcium 9.5  ALT 21  Hemoglobin A1c 6.8       Assessment and Plan    Diagnoses and all orders for this visit:    1. Permanent atrial fibrillation (Primary)  Symptomatically stable at this time, continue atenolol 50 mg daily.  Continue Eliquis for CVA prevention.    2. Hypertension, essential  Patient having lower blood pressure readings and some dizziness, cut hydrochlorothiazide dose in half to 12.5 mg daily for 2 weeks.  Advised patient to continue to check blood pressure at home and to notify office of any out of range.  Check BMP in 2 weeks to assess renal function and sodium level.  Continue nifedipine 90 mg daily and atenolol 50 mg daily.  -     Basic Metabolic Panel; Future    3. Hyperlipemia, mixed  Last lipid panel as not available for review,  continue simvastatin 40 mg nightly and recheck lipid panel with next visit.    Other orders  -     hydroCHLOROthiazide (HYDRODIURIL) 25 MG tablet; Take 0.5 tablets by mouth Every Morning. INST PER ANESTHESIA PROTOCOL  Dispense: 45 tablet; Refill: 0        Follow Up   Return in about 6 months (around 11/18/2022) for Follow up with Dr Sweet.    Patient was given instructions and counseling regarding his condition or for health maintenance advice. Please see specific information pulled into the AVS if appropriate.     Toney Neri  reports that he has quit smoking. His smoking use included cigarettes. He has never used smokeless tobacco.           Amparo Arrington, APRN  05/18/22  13:42 EDT    Dictated Utilizing Dragon Dictation

## 2022-06-01 ENCOUNTER — LAB (OUTPATIENT)
Dept: LAB | Facility: HOSPITAL | Age: 87
End: 2022-06-01

## 2022-06-01 DIAGNOSIS — I10 HYPERTENSION, ESSENTIAL: ICD-10-CM

## 2022-06-01 LAB
ANION GAP SERPL CALCULATED.3IONS-SCNC: 11 MMOL/L (ref 5–15)
BUN SERPL-MCNC: 18 MG/DL (ref 8–23)
BUN/CREAT SERPL: 14.8 (ref 7–25)
CALCIUM SPEC-SCNC: 9.5 MG/DL (ref 8.6–10.5)
CHLORIDE SERPL-SCNC: 102 MMOL/L (ref 98–107)
CO2 SERPL-SCNC: 26 MMOL/L (ref 22–29)
CREAT SERPL-MCNC: 1.22 MG/DL (ref 0.76–1.27)
EGFRCR SERPLBLD CKD-EPI 2021: 57.7 ML/MIN/1.73
GLUCOSE SERPL-MCNC: 155 MG/DL (ref 65–99)
POTASSIUM SERPL-SCNC: 3.6 MMOL/L (ref 3.5–5.2)
SODIUM SERPL-SCNC: 139 MMOL/L (ref 136–145)

## 2022-06-01 PROCEDURE — 80048 BASIC METABOLIC PNL TOTAL CA: CPT

## 2022-06-02 ENCOUNTER — TELEPHONE (OUTPATIENT)
Dept: CARDIOLOGY | Facility: CLINIC | Age: 87
End: 2022-06-02

## 2022-06-02 NOTE — TELEPHONE ENCOUNTER
----- Message from NINO Cordon sent at 6/2/2022  7:39 AM EDT -----  Labs are good, continue current meds

## 2022-06-06 ENCOUNTER — TELEPHONE (OUTPATIENT)
Dept: CARDIOLOGY | Facility: CLINIC | Age: 87
End: 2022-06-06

## 2022-08-12 ENCOUNTER — PREP FOR SURGERY (OUTPATIENT)
Dept: OTHER | Facility: HOSPITAL | Age: 87
End: 2022-08-12

## 2022-08-12 ENCOUNTER — PROCEDURE VISIT (OUTPATIENT)
Dept: UROLOGY | Facility: CLINIC | Age: 87
End: 2022-08-12

## 2022-08-12 VITALS — BODY MASS INDEX: 37.83 KG/M2 | WEIGHT: 256.2 LBS

## 2022-08-12 DIAGNOSIS — C67.9 UROTHELIAL CARCINOMA OF BLADDER: Primary | ICD-10-CM

## 2022-08-12 LAB
BILIRUB BLD-MCNC: NEGATIVE MG/DL
CLARITY, POC: CLEAR
COLOR UR: YELLOW
EXPIRATION DATE: NORMAL
GLUCOSE UR STRIP-MCNC: NEGATIVE MG/DL
KETONES UR QL: NEGATIVE
LEUKOCYTE EST, POC: NEGATIVE
Lab: NORMAL
NITRITE UR-MCNC: NEGATIVE MG/ML
PH UR: 5.5 [PH] (ref 5–8)
PROT UR STRIP-MCNC: NEGATIVE MG/DL
RBC # UR STRIP: NEGATIVE /UL
SP GR UR: 1.01 (ref 1–1.03)
UROBILINOGEN UR QL: NORMAL

## 2022-08-12 PROCEDURE — 81003 URINALYSIS AUTO W/O SCOPE: CPT | Performed by: UROLOGY

## 2022-08-12 PROCEDURE — 52000 CYSTOURETHROSCOPY: CPT | Performed by: UROLOGY

## 2022-08-12 RX ORDER — SODIUM CHLORIDE 9 MG/ML
100 INJECTION, SOLUTION INTRAVENOUS CONTINUOUS
Status: CANCELLED | OUTPATIENT
Start: 2022-08-12

## 2022-08-12 RX ORDER — LEVOFLOXACIN 5 MG/ML
500 INJECTION, SOLUTION INTRAVENOUS ONCE
Status: CANCELLED | OUTPATIENT
Start: 2022-08-12 | End: 2022-08-12

## 2022-08-12 RX ORDER — FINASTERIDE 5 MG/1
5 TABLET, FILM COATED ORAL DAILY
COMMUNITY
Start: 2022-07-26

## 2022-08-12 NOTE — PROGRESS NOTES
Cystoscopy    Date/Time: 8/12/2022 1:57 PM  Performed by: Yoli Le MD  Authorized by: Yoli Le MD   Preparation: Patient was prepped and draped in the usual sterile fashion.  Local anesthesia used: no    Anesthesia:  Local anesthesia used: no    Sedation:  Patient sedated: no    Patient tolerance: patient tolerated the procedure well with no immediate complications  Comments: The patient presents for follow-up of superficial bladder cancer.     The patient was last seen three months ago . The patient is scheduled for repeat cysto and u/a . Cystoscopy on the last visit showed low grade superficial UC.     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      Cystoscopy in June 2021 was normal with no residual tumor seen.    Cysto in Dec 2021 was normal with no tumor seen.     Cytoscopy Procedure:     Procedure: Flexible cytoscope was passed per urethra into the bladder without difficulty after proper consent. The bladder was inspected in a systematic meridian fashion. There was a small papillary area on the right lateral wall.  There were no other tumors seen. Of note, there was no increased vascularity as well. Both ureteral orifices were identified and were normal in appearance. The flexible cytoscope was removed. The patient tolerated the procedure well.

## 2022-08-12 NOTE — H&P
[facility]   Urology HISTORY AND PHYSICAL    Patient Name: Toney Neri  : 1935  MRN: [id number]  Primary Care Physician:  Una Neves APRN  Date of admission: (Not on file)    Subjective   Subjective       Patient has bladder tumor and presents for cystoscopy and transurethral section of bladder tumor.      Personal History     Past Medical History:   Diagnosis Date   • Acid reflux    • Arthritis    • Atrial fibrillation (HCC)     CHALLAPPA FOLLOWS PT,   • Coronary artery disease     NO INTERVENTION   • Diabetes mellitus, type 2 (HCC)     METFORMIN   • Elevated PSA    • High cholesterol    • Hypertension    • Limb swelling    • Myocardial infarction (HCC)     NO INTERVENTION, APPROX    • Skin lesion     RECENT SKIN CA REMOVED FROM FACE   • Urinary stream slowing    • Urothelial carcinoma of bladder (HCC) 2021       Past Surgical History:   Procedure Laterality Date   • ANKLE SURGERY Right     ankle repair   • BICEPS TENDON REPAIR Right    • COLONOSCOPY     • CYSTOSCOPY     • CYSTOSCOPY RETROGRADE PYELOGRAM Bilateral 2022    Procedure: CYSTOSCOPY RETROGRADE PYELOGRAM;  Surgeon: Yoli Le MD;  Location: [facility];  Service: Urology;  Laterality: Bilateral;   • ENDOSCOPY  2018   • GALLBLADDER SURGERY     • ROTATOR CUFF REPAIR Bilateral 1988   • TONSILLECTOMY     • TRANSURETHRAL RESECTION OF BLADDER TUMOR N/A 2021    Procedure: CYSTOSCOPY TRANSURETHRAL RESECTION OF BLADDER TUMOR;  Surgeon: Yoli Le MD;  Location: [facility];  Service: Urology;  Laterality: N/A;   • TRANSURETHRAL RESECTION OF BLADDER TUMOR N/A 2022    Procedure: CYSTOSCOPY TRANSURETHRAL RESECTION OF BLADDER TUMOR;  Surgeon: Yoli Le MD;  Location: [facility];  Service: Urology;  Laterality: N/A;       Family History: family history includes Arthritis in his father and mother; Diabetes in an other family member; Heart disease in his mother; Prostate  cancer in his father and son. Otherwise pertinent FHx was reviewed and not pertinent to current issue.    Social History:  reports that he has quit smoking. His smoking use included cigarettes. He has never used smokeless tobacco. He reports current alcohol use. He reports that he does not use drugs.    Home Medications:  Boswellia-Glucosamine-Vit D, Magnesium Oxide, NIFEdipine CC, NIFEdipine XL, acetaminophen, albuterol sulfate HFA, apixaban, aspirin, atenolol, carboxymethylcellulose, finasteride, fluticasone, hydroCHLOROthiazide, loratadine, meclizine, melatonin, metFORMIN, multivitamin with minerals, pantoprazole, potassium chloride, simvastatin, tamsulosin, terazosin, and vitamin C    Allergies:  No Known Allergies    Objective    Objective     Vitals:        Physical Exam  Constitutional:       Appearance: Normal appearance.   Cardiovascular:      Rate and Rhythm: Normal rate and regular rhythm.   Pulmonary:      Effort: Pulmonary effort is normal.      Breath sounds: Normal breath sounds.   Neurological:      Mental Status: He is alert. Mental status is at baseline.   Psychiatric:         Mood and Affect: Mood and affect normal.         Speech: Speech normal.         Judgment: Judgment normal.         Result Review    Result Review:  I have personally reviewed the results from the time of this admission to 8/12/2022 14:07 EDT and agree with these findings:  [x]  Laboratory  []  Microbiology  [x]  Radiology  []  EKG/Telemetry   []  Cardiology/Vascular   [x]  Pathology  [x]  Old records  []  Other:      Assessment & Plan   Assessment / Plan       Active Hospital Problems:  There are no active hospital problems to display for this patient.      Plan: transurethral resection of bladder tumor  Risks and benefits discussed with patient and they are agreeable to proceed.    DVT prophylaxis:  No DVT prophylaxis order currently exists.    CODE STATUS:           Electronically signed by Yoli Le MD, 08/12/22,  2:07 PM EDT.

## 2022-08-16 ENCOUNTER — TELEPHONE (OUTPATIENT)
Dept: UROLOGY | Facility: CLINIC | Age: 87
End: 2022-08-16

## 2022-08-16 NOTE — TELEPHONE ENCOUNTER
I spoke with PT and asked him when he wanted to schedule his procedure to remove his tumor with Dr. Le. Pt questioned this and thought he was just coming back here in a couple months for an office cysto. I told him I would speak with Dr. Le and that she and I would get back with him

## 2022-08-18 ENCOUNTER — TELEPHONE (OUTPATIENT)
Dept: UROLOGY | Facility: CLINIC | Age: 87
End: 2022-08-18

## 2022-08-18 NOTE — TELEPHONE ENCOUNTER
Spoke with PT and scheduled his procedure with Dr. Le for November 10, 2022, I told him I would be mailing instructions to him and went over a few preop instructions. PT voiced that he had this done many times and was familiar with the process. I told him I would be contacting Dr. Sweet's office for instructions as to how many days before the procedure to stop Eliquis. I will send that information in the mail. I told him to call the office if he had any questions.

## 2022-09-28 ENCOUNTER — TELEPHONE (OUTPATIENT)
Dept: UROLOGY | Facility: CLINIC | Age: 87
End: 2022-09-28

## 2022-09-28 NOTE — TELEPHONE ENCOUNTER
[facility] [place] [facility]  [address] [place] [address]  Fax [phone]  Phone [phone]       09/28/22            To Whom It May Concern:        Our records indicate this patient is currently under anticoagulant therapy Toney Neri 1935 is to be cleared to hold his eliquis  for 5 days prior to his Cystoscopy, Transurethral resection of bladder tumor on 11/10/22. You may contact our office at [phone] with any questions. I appreciate your prompt response in this matter. Please return this form to our office as soon as possible to [phone]. Thank you for your time and assistance.     [] I approve my patient, Toney Neri , to stop taking anticoagulant therapy medication 5 days prior to procedure  [] I do NOT approve my patient, Toney Neri , to stop taking anticoagulant therapy medication at this time.   [] I approve my patient, Toney Neri from a cardiac standpoint  [] I do NOT approve my patient, Toney Neri from a cardiac standpoint      Thank you,          Approving physician name (please print): Jacobo Sweet MD      Approving Physician signature: _________________________________     Date: _____________________      Sincerely,     Yoli Le MD

## 2022-09-29 NOTE — TELEPHONE ENCOUNTER
Procedure: Cystoscopy, Transurethral Resection of Bladder Tumor    Med Directive: Eliquis    PMH: afib, HTN, hyperlipidemia, MI 2010    Last Seen: 5/18/2022

## 2022-11-07 NOTE — PRE-PROCEDURE INSTRUCTIONS
Patient instructed to have no food past midnight, clears up to 2 hours prior to arrival time. Patient instructed to wear no lotions, jewelry or piercing's day of surgery.  Patient desires to only take Atenolol am of surgery.

## 2022-11-08 ENCOUNTER — ANESTHESIA EVENT (OUTPATIENT)
Dept: PERIOP | Facility: HOSPITAL | Age: 87
End: 2022-11-08

## 2022-11-10 ENCOUNTER — ANESTHESIA (OUTPATIENT)
Dept: PERIOP | Facility: HOSPITAL | Age: 87
End: 2022-11-10

## 2022-11-10 ENCOUNTER — HOSPITAL ENCOUNTER (OUTPATIENT)
Facility: HOSPITAL | Age: 87
Setting detail: HOSPITAL OUTPATIENT SURGERY
Discharge: HOME | End: 2022-11-10
Attending: UROLOGY | Admitting: UROLOGY

## 2022-11-10 VITALS
SYSTOLIC BLOOD PRESSURE: 140 MMHG | RESPIRATION RATE: 16 BRPM | BODY MASS INDEX: 36.93 KG/M2 | TEMPERATURE: 96.6 F | WEIGHT: 249.34 LBS | HEART RATE: 67 BPM | OXYGEN SATURATION: 95 % | DIASTOLIC BLOOD PRESSURE: 77 MMHG | HEIGHT: 69 IN

## 2022-11-10 DIAGNOSIS — C67.9 UROTHELIAL CARCINOMA OF BLADDER: ICD-10-CM

## 2022-11-10 LAB
GLUCOSE BLDC GLUCOMTR-MCNC: 134 MG/DL (ref 70–99)
GLUCOSE BLDC GLUCOMTR-MCNC: 163 MG/DL (ref 70–99)

## 2022-11-10 PROCEDURE — 25010000002 ONDANSETRON PER 1 MG: Performed by: NURSE ANESTHETIST, CERTIFIED REGISTERED

## 2022-11-10 PROCEDURE — 25010000002 PROPOFOL 10 MG/ML EMULSION: Performed by: NURSE ANESTHETIST, CERTIFIED REGISTERED

## 2022-11-10 PROCEDURE — 25010000002 FENTANYL CITRATE (PF) 50 MCG/ML SOLUTION: Performed by: NURSE ANESTHETIST, CERTIFIED REGISTERED

## 2022-11-10 PROCEDURE — 88307 TISSUE EXAM BY PATHOLOGIST: CPT | Performed by: UROLOGY

## 2022-11-10 PROCEDURE — 25010000002 LEVOFLOXACIN PER 250 MG: Performed by: UROLOGY

## 2022-11-10 PROCEDURE — 82962 GLUCOSE BLOOD TEST: CPT

## 2022-11-10 PROCEDURE — 52235 CYSTOSCOPY AND TREATMENT: CPT | Performed by: UROLOGY

## 2022-11-10 RX ORDER — PROMETHAZINE HYDROCHLORIDE 12.5 MG/1
12.5 TABLET ORAL ONCE AS NEEDED
Status: DISCONTINUED | OUTPATIENT
Start: 2022-11-10 | End: 2022-11-10 | Stop reason: HOSPADM

## 2022-11-10 RX ORDER — MEPERIDINE HYDROCHLORIDE 25 MG/ML
12.5 INJECTION INTRAMUSCULAR; INTRAVENOUS; SUBCUTANEOUS
Status: DISCONTINUED | OUTPATIENT
Start: 2022-11-10 | End: 2022-11-10 | Stop reason: HOSPADM

## 2022-11-10 RX ORDER — PROMETHAZINE HYDROCHLORIDE 25 MG/1
25 SUPPOSITORY RECTAL ONCE AS NEEDED
Status: DISCONTINUED | OUTPATIENT
Start: 2022-11-10 | End: 2022-11-10 | Stop reason: HOSPADM

## 2022-11-10 RX ORDER — IBUPROFEN 600 MG/1
600 TABLET ORAL EVERY 6 HOURS PRN
Status: DISCONTINUED | OUTPATIENT
Start: 2022-11-10 | End: 2022-11-10 | Stop reason: HOSPADM

## 2022-11-10 RX ORDER — LIDOCAINE HYDROCHLORIDE 20 MG/ML
INJECTION, SOLUTION EPIDURAL; INFILTRATION; INTRACAUDAL; PERINEURAL AS NEEDED
Status: DISCONTINUED | OUTPATIENT
Start: 2022-11-10 | End: 2022-11-10 | Stop reason: SURG

## 2022-11-10 RX ORDER — PROMETHAZINE HYDROCHLORIDE 12.5 MG/1
25 TABLET ORAL ONCE AS NEEDED
Status: DISCONTINUED | OUTPATIENT
Start: 2022-11-10 | End: 2022-11-10 | Stop reason: HOSPADM

## 2022-11-10 RX ORDER — SUCCINYLCHOLINE/SOD CL,ISO/PF 100 MG/5ML
SYRINGE (ML) INTRAVENOUS AS NEEDED
Status: DISCONTINUED | OUTPATIENT
Start: 2022-11-10 | End: 2022-11-10 | Stop reason: SURG

## 2022-11-10 RX ORDER — SODIUM CHLORIDE, SODIUM LACTATE, POTASSIUM CHLORIDE, CALCIUM CHLORIDE 600; 310; 30; 20 MG/100ML; MG/100ML; MG/100ML; MG/100ML
9 INJECTION, SOLUTION INTRAVENOUS CONTINUOUS PRN
Status: DISCONTINUED | OUTPATIENT
Start: 2022-11-10 | End: 2022-11-10 | Stop reason: HOSPADM

## 2022-11-10 RX ORDER — FENTANYL CITRATE 50 UG/ML
INJECTION, SOLUTION INTRAMUSCULAR; INTRAVENOUS AS NEEDED
Status: DISCONTINUED | OUTPATIENT
Start: 2022-11-10 | End: 2022-11-10 | Stop reason: SURG

## 2022-11-10 RX ORDER — SODIUM CHLORIDE 9 MG/ML
100 INJECTION, SOLUTION INTRAVENOUS CONTINUOUS
Status: DISCONTINUED | OUTPATIENT
Start: 2022-11-10 | End: 2022-11-10 | Stop reason: HOSPADM

## 2022-11-10 RX ORDER — ONDANSETRON 2 MG/ML
4 INJECTION INTRAMUSCULAR; INTRAVENOUS ONCE AS NEEDED
Status: DISCONTINUED | OUTPATIENT
Start: 2022-11-10 | End: 2022-11-10 | Stop reason: HOSPADM

## 2022-11-10 RX ORDER — OXYCODONE HYDROCHLORIDE 5 MG/1
5 TABLET ORAL
Status: DISCONTINUED | OUTPATIENT
Start: 2022-11-10 | End: 2022-11-10 | Stop reason: HOSPADM

## 2022-11-10 RX ORDER — ROCURONIUM BROMIDE 10 MG/ML
INJECTION, SOLUTION INTRAVENOUS AS NEEDED
Status: DISCONTINUED | OUTPATIENT
Start: 2022-11-10 | End: 2022-11-10 | Stop reason: SURG

## 2022-11-10 RX ORDER — LEVOFLOXACIN 5 MG/ML
500 INJECTION, SOLUTION INTRAVENOUS ONCE
Status: COMPLETED | OUTPATIENT
Start: 2022-11-10 | End: 2022-11-10

## 2022-11-10 RX ORDER — MAGNESIUM HYDROXIDE 1200 MG/15ML
LIQUID ORAL AS NEEDED
Status: DISCONTINUED | OUTPATIENT
Start: 2022-11-10 | End: 2022-11-10 | Stop reason: HOSPADM

## 2022-11-10 RX ORDER — ACETAMINOPHEN 500 MG
1000 TABLET ORAL ONCE
Status: COMPLETED | OUTPATIENT
Start: 2022-11-10 | End: 2022-11-10

## 2022-11-10 RX ORDER — ACETAMINOPHEN 325 MG/1
650 TABLET ORAL ONCE
Status: DISCONTINUED | OUTPATIENT
Start: 2022-11-10 | End: 2022-11-10 | Stop reason: HOSPADM

## 2022-11-10 RX ORDER — ONDANSETRON 2 MG/ML
INJECTION INTRAMUSCULAR; INTRAVENOUS AS NEEDED
Status: DISCONTINUED | OUTPATIENT
Start: 2022-11-10 | End: 2022-11-10 | Stop reason: SURG

## 2022-11-10 RX ORDER — PROPOFOL 10 MG/ML
VIAL (ML) INTRAVENOUS AS NEEDED
Status: DISCONTINUED | OUTPATIENT
Start: 2022-11-10 | End: 2022-11-10 | Stop reason: SURG

## 2022-11-10 RX ADMIN — Medication 100 MG: at 07:49

## 2022-11-10 RX ADMIN — SUGAMMADEX 200 MG: 100 INJECTION, SOLUTION INTRAVENOUS at 08:24

## 2022-11-10 RX ADMIN — ROCURONIUM BROMIDE 5 MG: 10 INJECTION INTRAVENOUS at 07:49

## 2022-11-10 RX ADMIN — FENTANYL CITRATE 50 MCG: 50 INJECTION, SOLUTION INTRAMUSCULAR; INTRAVENOUS at 07:49

## 2022-11-10 RX ADMIN — LIDOCAINE HYDROCHLORIDE 100 MG: 20 INJECTION, SOLUTION EPIDURAL; INFILTRATION; INTRACAUDAL; PERINEURAL at 07:49

## 2022-11-10 RX ADMIN — PROPOFOL 160 MG: 10 INJECTION, EMULSION INTRAVENOUS at 07:49

## 2022-11-10 RX ADMIN — ACETAMINOPHEN 1000 MG: 500 TABLET ORAL at 06:55

## 2022-11-10 RX ADMIN — FENTANYL CITRATE 50 MCG: 50 INJECTION, SOLUTION INTRAMUSCULAR; INTRAVENOUS at 08:18

## 2022-11-10 RX ADMIN — LEVOFLOXACIN 500 MG: 500 INJECTION, SOLUTION INTRAVENOUS at 07:43

## 2022-11-10 RX ADMIN — SODIUM CHLORIDE, POTASSIUM CHLORIDE, SODIUM LACTATE AND CALCIUM CHLORIDE 9 ML/HR: 600; 310; 30; 20 INJECTION, SOLUTION INTRAVENOUS at 06:53

## 2022-11-10 RX ADMIN — ROCURONIUM BROMIDE 45 MG: 10 INJECTION INTRAVENOUS at 08:00

## 2022-11-10 RX ADMIN — ONDANSETRON 4 MG: 2 INJECTION INTRAMUSCULAR; INTRAVENOUS at 08:00

## 2022-11-10 NOTE — DISCHARGE INSTRUCTIONS
DISCHARGE INSTRUCTIONS CYSTOSCOPY      For your surgery you had:  General anesthesia (you may have a sore throat for the first 24 hours)  You may experience dizziness, drowsiness, or lightheadedness for several hours following surgery.  Do not stay alone today or tonight.  Limit your activity for 24 hours.  You should not drive, operate machinery, drink alcohol, or sign legally binding documents for 24 hours or while you are taking pain medication.  Resume your diet slowly.  Follow any special dietary instructions you may have been given by your doctor.    NOTIFY YOUR DOCTOR IF YOU EXPERIENCE ANY OF THE FOLLOWING:  Temperature greater than 101 degrees Fahrenheit  Shaking Chills  Redness or excessive drainage from incision  Nausea, vomiting and/or pain that is not controlled by prescribed medications  Increase in bleeding or bleeding that is excessive  Unable to urinate in 6 hours after surgery  If unable to reach your doctor, please go to the closest Emergency Room   Following your cystoscopy exam, you may experience burning upon urination.  You may also pass some bloody urine.  If the burning sensation and/or bloody urine should persist beyond 48 hours, call your doctor.  To encourage kidney and bladder function, you should drink as much fluid as possible.  If you have difficulty urinating, try sitting in a bath tub of warm water.  If you become uncomfortable because you cannot urinate, call your doctor or come to the Emergency Room at the hospital.  Medications per physician instructions as indicated on Discharge Medication Information Sheet.    Last dose of pain medication given at:   Tylenol (1000mg) last at 7am. Do not exceed 4000mg of tylenol in a 24 hour period.  May take tylenol next at 1pm if needed.

## 2022-11-10 NOTE — H&P
[facility]   Urology HISTORY AND PHYSICAL    Patient Name: Toney Neri  : 1935  MRN: [id number]  Primary Care Physician:  Una Neves APRN  Date of admission: 11/10/2022    Subjective   Subjective       History of Present Illness  Patient has bladder tumor and presents for cystoscopy and transurethral section of bladder tumor.      Personal History     Past Medical History:   Diagnosis Date   • Acid reflux    • Arthritis    • Atrial fibrillation (HCC)     CHALLAPPA FOLLOWS PT,   • Coronary artery disease     NO INTERVENTION   • Diabetes mellitus, type 2 (HCC)     METFORMIN   • Elevated PSA    • High cholesterol    • Hypertension    • Limb swelling    • Myocardial infarction (HCC)     NO INTERVENTION, APPROX    • Skin lesion     RECENT SKIN CA REMOVED FROM FACE   • Urinary stream slowing    • Urothelial carcinoma of bladder (HCC) 2021       Past Surgical History:   Procedure Laterality Date   • ANKLE SURGERY Right     ankle repair   • BICEPS TENDON REPAIR Right    • COLONOSCOPY     • CYSTOSCOPY     • CYSTOSCOPY RETROGRADE PYELOGRAM Bilateral 2022    Procedure: CYSTOSCOPY RETROGRADE PYELOGRAM;  Surgeon: Yoli Le MD;  Location: [facility];  Service: Urology;  Laterality: Bilateral;   • ENDOSCOPY  2018   • GALLBLADDER SURGERY     • ROTATOR CUFF REPAIR Bilateral 1988   • TONSILLECTOMY     • TRANSURETHRAL RESECTION OF BLADDER TUMOR N/A 2021    Procedure: CYSTOSCOPY TRANSURETHRAL RESECTION OF BLADDER TUMOR;  Surgeon: Yoli Le MD;  Location: [facility];  Service: Urology;  Laterality: N/A;   • TRANSURETHRAL RESECTION OF BLADDER TUMOR N/A 2022    Procedure: CYSTOSCOPY TRANSURETHRAL RESECTION OF BLADDER TUMOR;  Surgeon: Yoli Le MD;  Location: [facility];  Service: Urology;  Laterality: N/A;       Family History: family history includes Arthritis in his father and mother; Diabetes in an other family member; Heart  disease in his mother; Prostate cancer in his father and son. Otherwise pertinent FHx was reviewed and not pertinent to current issue.    Social History:  reports that he has quit smoking. His smoking use included cigarettes. He has never used smokeless tobacco. He reports current alcohol use. He reports that he does not use drugs.    Home Medications:  Magnesium Oxide, NIFEdipine XL, acetaminophen, albuterol sulfate HFA, apixaban, aspirin, atenolol, carboxymethylcellulose, finasteride, fluticasone, hydroCHLOROthiazide, loratadine, meclizine, melatonin, metFORMIN, multivitamin with minerals, pantoprazole, potassium chloride, simvastatin, tamsulosin, and vitamin C    Allergies:  No Known Allergies    Objective    Objective     Vitals:   Temp:  [97.5 °F (36.4 °C)] 97.5 °F (36.4 °C)  Heart Rate:  [70] 70  Resp:  [20] 20  BP: (130)/(77) 130/77    Physical Exam  Constitutional:       Appearance: Normal appearance.   Cardiovascular:      Rate and Rhythm: Normal rate and regular rhythm.   Pulmonary:      Effort: Pulmonary effort is normal.      Breath sounds: Normal breath sounds.   Neurological:      Mental Status: He is alert. Mental status is at baseline.   Psychiatric:         Mood and Affect: Mood and affect normal.         Speech: Speech normal.         Judgment: Judgment normal.         Result Review    Result Review:  I have personally reviewed the results from the time of this admission to 11/10/2022 07:38 EST and agree with these findings:  [x]  Laboratory  []  Microbiology  [x]  Radiology  []  EKG/Telemetry   []  Cardiology/Vascular   [x]  Pathology  [x]  Old records  []  Other:      Assessment & Plan   Assessment / Plan       Active Hospital Problems:  Active Hospital Problems    Diagnosis    • **Urothelial carcinoma of bladder (HCC)        Plan: transurethral resection of bladder tumor  Risks and benefits discussed with patient and they are agreeable to proceed.    DVT prophylaxis:  No DVT prophylaxis order  currently exists.    CODE STATUS:

## 2022-11-10 NOTE — OP NOTE
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    A Urinary Bladder Tissue · TISSUE PATHOLOGY EXAM   Yoli Le MD 11/10/22 [id number]     Description: bladder tumor    This specimen was not marked as sent.              Complications: None    Description of Procedure:     After proper consent was obtained, patient was taken to operating room and general anesthesia was performed.  The patient was placed in the dorsal lithotomy position and prepped and draped in the normal sterile fashion for cystoscopy.      A 22 Syrian rigid cystoscope was inserted into the bladder.  The bladder was inspected in a systemic meridian fashion using a 30 degree lens.  There was a papillary appearing bladder tumor with calcifications just behind the right ureteral orifice on the right lateral wall.  The tumor was papillary in nature.  A gyrus resectoscope was inserted into the bladder with a medium loop.  The loop was then used to resect the tumor in its entirety.  There was no perforation of the bladder noted.  The area of resection was then fulgurated to stop any bleeding.  Good muscle was sent with the specimen.  The entire area of resection was approximately 3 cm in size.  Again, no peroration of the bladder was noted.      The pieces of bladder tumor were then removed from the bladder and sent to pathology  labeled as bladder tumor.  There was no remaining bleeding.      The patient tolerated the procedure well and was transferred to the PACU in stable condition.      Yoli Le MD     Date: 11/10/2022  Time: 08:43 EST

## 2022-11-10 NOTE — ANESTHESIA PREPROCEDURE EVALUATION
" Anesthesia Evaluation     Patient summary reviewed and Nursing notes reviewed   no history of anesthetic complications:  NPO Solid Status: > 8 hours  NPO Liquid Status: > 2 hours           Airway   Mallampati: II  TM distance: >3 FB  Neck ROM: full  No difficulty expected  Dental    (+) upper dentures and lower dentures    Pulmonary - normal exam    breath sounds clear to auscultation  (+) sleep apnea,   Cardiovascular - normal exam  Exercise tolerance: poor (<4 METS)    Rhythm: regular  Rate: normal    (+) hypertension, past MI , CAD, dysrhythmias Atrial Fib, hyperlipidemia,     ROS comment: ECHO 2/20 MILD MR, EF 55%, LEFT VENTRICAL HYPERTROPHY,   2/20 stress test was negative for ischemia.     Neuro/Psych  (+) CVA,    GI/Hepatic/Renal/Endo    (+) obesity,  GERD,  diabetes mellitus,     Musculoskeletal     Abdominal    Substance History      OB/GYN          Other   arthritis,      ROS/Med Hx Other: <4METS, DECREASED MOBILITIY. HX PERM. AFIB, MI X2 (UNKNOWN ORIGIN), HTN. CARDS OV 5/18/22 \"DIZZINESS, LOW B/P\", MEDS ADJUSTED, F/U 6MTH[facility]. CARDS CLEARANCE.  KT                   Anesthesia Plan    ASA 4     general and MAC   total IV anesthesia    Anesthetic plan, risks, benefits, and alternatives have been provided, discussed and informed consent has been obtained with: patient.        CODE STATUS:       "

## 2022-11-10 NOTE — ANESTHESIA POSTPROCEDURE EVALUATION
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        Post Anesthesia Care and Evaluation    Patient location during evaluation: bedside  Patient participation: complete - patient participated  Level of consciousness: awake  Pain management: adequate    Airway patency: patent  Anesthetic complications: No anesthetic complications  PONV Status: none  Cardiovascular status: acceptable and stable  Respiratory status: acceptable  Hydration status: acceptable    Comments: An Anesthesiologist personally participated in the most demanding procedures (including induction and emergence if applicable) in the anesthesia plan, monitored the course of anesthesia administration at frequent intervals and remained physically present and available for immediate diagnosis and treatment of emergencies.

## 2022-11-11 ENCOUNTER — TELEPHONE (OUTPATIENT)
Dept: UROLOGY | Facility: CLINIC | Age: 87
End: 2022-11-11

## 2022-11-11 NOTE — TELEPHONE ENCOUNTER
Caller: Toney Neri    Relationship to patient: SELF    Best call back number: [phone]  Patient is needing: PT HAS POST OP APPT ON 11/16/22 AT 9:00. PT HAS A CONFLICT WITH ANOTHER MD APPT AT 8:50. PT NEEDS TO RESCHEDULE.     PT ASKING IF IT CAN BE A TELEPHONE APPT.      PER DISCHARGE SUMMARY, PT IS TO FOLLOW UP WITH PCP. PT WOULD LIKE CLARIFICATION ON THIS . PLEASE GIVE PT A CALL BACK TO SCHEDULE

## 2022-11-16 ENCOUNTER — OFFICE VISIT (OUTPATIENT)
Dept: UROLOGY | Facility: CLINIC | Age: 87
End: 2022-11-16

## 2022-11-16 VITALS — WEIGHT: 249.2 LBS | HEIGHT: 69 IN | RESPIRATION RATE: 16 BRPM | BODY MASS INDEX: 36.91 KG/M2

## 2022-11-16 DIAGNOSIS — C67.8 MALIGNANT NEOPLASM OF OVERLAPPING SITES OF BLADDER: Primary | ICD-10-CM

## 2022-11-16 DIAGNOSIS — N30.01 ACUTE CYSTITIS WITH HEMATURIA: ICD-10-CM

## 2022-11-16 LAB
BILIRUB BLD-MCNC: NEGATIVE MG/DL
CLARITY, POC: CLEAR
COLOR UR: YELLOW
EXPIRATION DATE: ABNORMAL
GLUCOSE UR STRIP-MCNC: NEGATIVE MG/DL
KETONES UR QL: ABNORMAL
LEUKOCYTE EST, POC: ABNORMAL
Lab: ABNORMAL
NITRITE UR-MCNC: NEGATIVE MG/ML
PH UR: 6 [PH] (ref 5–8)
PROT UR STRIP-MCNC: ABNORMAL MG/DL
RBC # UR STRIP: ABNORMAL /UL
SP GR UR: 1.01 (ref 1–1.03)
UROBILINOGEN UR QL: ABNORMAL

## 2022-11-16 PROCEDURE — 87147 CULTURE TYPE IMMUNOLOGIC: CPT | Performed by: UROLOGY

## 2022-11-16 PROCEDURE — 99213 OFFICE O/P EST LOW 20 MIN: CPT | Performed by: UROLOGY

## 2022-11-16 PROCEDURE — 81003 URINALYSIS AUTO W/O SCOPE: CPT | Performed by: UROLOGY

## 2022-11-16 PROCEDURE — 87086 URINE CULTURE/COLONY COUNT: CPT | Performed by: UROLOGY

## 2022-11-16 NOTE — PROGRESS NOTES
"Chief Complaint  Malignant neoplasm of overlapping sites of bladder    Subjective          Toney Neri presents to [facility] UROLOGY  History of Present Illness    The patient presents today for a follow-up.    He reports he is doing well. He was told he had a possible infection in his kidneys. He has a little bit of irritation when he urinates.      Objective   Vital Signs:   Resp 16   Ht 175.3 cm (69\")   Wt 113 kg (249 lb 3.2 oz)   BMI 36.80 kg/m²       Physical Exam  Vitals and nursing note reviewed.   Constitutional:       Appearance: Normal appearance. He is well-developed.   Pulmonary:      Effort: Pulmonary effort is normal.      Breath sounds: Normal air entry.   Neurological:      Mental Status: He is alert and oriented to person, place, and time.      Motor: Motor function is intact.   Psychiatric:         Mood and Affect: Mood normal.         Behavior: Behavior normal.          Result Review :   The following data was reviewed by: Yoli Le MD on 11/16/2022:    Results for orders placed or performed in visit on 11/16/22   POC Urinalysis Dipstick, Automated    Specimen: Urine   Result Value Ref Range    Color Yellow Yellow, Straw, Dark Yellow, Lynne    Clarity, UA Clear Clear    Specific Gravity  1.015 1.005 - 1.030    pH, Urine 6.0 5.0 - 8.0    Leukocytes Small (1+) (A) Negative    Nitrite, UA Negative Negative    Protein, POC 30 mg/dL (A) Negative mg/dL    Glucose, UA Negative Negative mg/dL    Ketones, UA Trace (A) Negative    Urobilinogen, UA 0.2 E.U./dL Normal, 0.2 E.U./dL    Bilirubin Negative Negative    Blood, UA Large (A) Negative    Lot Number [id number]     Expiration Date 112,023        Data reviewed: Bladder pathology results:   Clinical Information    Urothelial carcinoma of bladder (HCC)   Final Diagnosis   Urinary bladder, tumor, transurethral resection:               -Chronic cystitis               -Negative for malignancy   Electronically signed by " Pamela Horn MD on 11/11/2022 at 1041            Assessment and Plan    Diagnoses and all orders for this visit:    1. Malignant neoplasm of overlapping sites of bladder (HCC) (Primary)  -     POC Urinalysis Dipstick, Automated    - Will see him back for a cystoscopy in 3 to 4 months.  - Will send urine for a urine culture and will notify patient of results.          Follow Up       No follow-ups on file.  Patient was given instructions and counseling regarding his condition or for health maintenance advice. Please see specific information pulled into the AVS if appropriate.     Transcribed from ambient dictation for Yoli Le MD by Verna Ramsey.  11/16/22   13:10 EST    Patient or patient representative verbalized consent to the visit recording.  I have personally performed the services described in this document as transcribed by the above individual, and it is both accurate and complete.  Yoli Le MD  11/17/2022  15:01 EST

## 2022-11-18 LAB — BACTERIA SPEC AEROBE CULT: ABNORMAL

## 2022-12-15 NOTE — PROGRESS NOTES
[facility]  Cardiology progress Note    Patient Name: Toney Neri  : 1935    CHIEF COMPLAINT  Atrial fibrillation        Subjective   Subjective     HISTORY OF PRESENT ILLNESS    Toney Neri is a 87 y.o. male with history of atrial fibrillation.  No chest pain or shortness of breath.    REVIEW OF SYSTEMS    Constitutional:    No fever, no weight loss  Skin:     No rash  Otolaryngeal:    No difficulty swallowing  Cardiovascular: See HPI.  Pulmonary:    No cough, no sputum production    Personal History     Social History:    reports that he has quit smoking. His smoking use included cigarettes. He has never used smokeless tobacco. He reports current alcohol use. He reports that he does not use drugs.    Home Medications:  Current Outpatient Medications on File Prior to Visit   Medication Sig   • acetaminophen (TYLENOL) 325 MG tablet Take 325 mg by mouth Daily.   • albuterol sulfate  (90 Base) MCG/ACT inhaler Inhale 1 puff Daily As Needed.   • apixaban (Eliquis) 5 MG tablet tablet Take 1 tablet by mouth 2 (two) times a day. Last dose to be 2 days prior to surgery per Dr. Sweet . Patient aware   • Aspirin Low Dose 81 MG EC tablet Take 81 mg by mouth Daily. NO STOP NEEDED ON ASA PER DR. HENDRICKS   • atenolol (TENORMIN) 50 MG tablet Take 50 mg by mouth Every Morning.   • carboxymethylcellulose (REFRESH PLUS) 0.5 % solution 1 drop 4 (Four) Times a Day.   • finasteride (PROSCAR) 5 MG tablet Take 5 mg by mouth Daily.   • fluticasone (FLONASE) 50 MCG/ACT nasal spray 1 spray by Each Nare route Every Morning.   • loratadine (CLARITIN) 10 MG tablet Take 10 mg by mouth Every Morning.   • Magnesium Oxide 400 (240 Mg) MG tablet Take 400 mg by mouth Daily.   • meclizine (ANTIVERT) 25 MG tablet Take 1 tablet by mouth 3 (Three) Times a Day As Needed for Dizziness.   • melatonin 3 MG tablet Take 6 mg by mouth every night at bedtime.   • metFORMIN (GLUCOPHAGE) 1000 MG tablet Take 1,000 mg by  mouth 2 (Two) Times a Day. INST PER ANESTHESIA PROTOCOL   • multivitamin with minerals tablet tablet Take 1 tablet by mouth Daily.   • NIFEdipine XL (PROCARDIA XL) 90 MG 24 hr tablet Take 90 mg by mouth Every Morning.   • pantoprazole (PROTONIX) 40 MG EC tablet Take 1 tablet by mouth As Needed.   • potassium chloride 10 MEQ CR tablet Take 10 mEq by mouth Daily.   • simvastatin (ZOCOR) 40 MG tablet Take 40 mg by mouth Every Night.   • tamsulosin (FLOMAX) 0.4 MG capsule 24 hr capsule Take 1 capsule by mouth 2 (Two) Times a Day.   • vitamin C (ASCORBIC ACID) 250 MG tablet Take 250 mg by mouth Daily.   • [DISCONTINUED] hydroCHLOROthiazide (HYDRODIURIL) 25 MG tablet Take 0.5 tablets by mouth Every Morning. INST PER ANESTHESIA PROTOCOL     No current facility-administered medications on file prior to visit.       Past Medical History:   Diagnosis Date   • Acid reflux    • Arthritis    • Atrial fibrillation (HCC)     CHALLAPPA FOLLOWS PT,   • Coronary artery disease     NO INTERVENTION   • Diabetes mellitus, type 2 (HCC)     METFORMIN   • Elevated PSA    • High cholesterol    • Hypertension    • Limb swelling    • Myocardial infarction (HCC)     NO INTERVENTION, APPROX 2010   • Skin lesion     RECENT SKIN CA REMOVED FROM FACE   • Urinary stream slowing    • Urothelial carcinoma of bladder (HCC) 04/22/2021       Allergies:  No Known Allergies    Objective    Objective       Vitals:   Heart Rate:  [75] 75  BP: (135)/(64) 135/64  Body mass index is 37.51 kg/m².     PHYSICAL EXAM:    General Appearance:   · well developed  · well nourished  HENT:   · oropharynx moist  · lips not cyanotic  Neck:  · thyroid not enlarged  · supple  Respiratory:  · no respiratory distress  · normal breath sounds  · no rales  Cardiovascular:  · no jugular venous distention  · regular rhythm  · apical impulse normal  · S1 normal, S2 normal  · no S3, no S4   · no murmur  · no rub, no thrill  · carotid pulses normal; no bruit  · pedal pulses  normal  · lower extremity edema: none    Skin:   · warm, dry  Psychiatric:  · judgement and insight appropriate  · normal mood and affect        Result Review:  I have personally reviewed the available results from  [x]  Laboratory  [x]  EKG  [x]  Cardiology  [x]  Medications  [x]  Old records  []  Other:     Procedures    Impression/Plan:  1.  Permanent atrial fibrillation controlled heart rate: Continue Eliquis 5 mg twice a day     for stroke prevention.  Continue atenolol 50 mg a day for rate control.  2.  Essential hypertension controlled: Continue Procardia XL 90 mg a day.  Blood pressure controlled at home.  3.  Hyperlipidemia: Continue Zocor 40 mg a day.  Monitor lipid and hepatic profile           Jacobo Sweet MD   12/16/22   11:54 EST

## 2022-12-16 ENCOUNTER — OFFICE VISIT (OUTPATIENT)
Dept: CARDIOLOGY | Facility: CLINIC | Age: 87
End: 2022-12-16

## 2022-12-16 VITALS
BODY MASS INDEX: 37.62 KG/M2 | WEIGHT: 254 LBS | SYSTOLIC BLOOD PRESSURE: 135 MMHG | DIASTOLIC BLOOD PRESSURE: 64 MMHG | HEIGHT: 69 IN | HEART RATE: 75 BPM

## 2022-12-16 DIAGNOSIS — I48.21 PERMANENT ATRIAL FIBRILLATION: ICD-10-CM

## 2022-12-16 DIAGNOSIS — E78.2 HYPERLIPEMIA, MIXED: ICD-10-CM

## 2022-12-16 DIAGNOSIS — I10 HYPERTENSION, ESSENTIAL: Primary | ICD-10-CM

## 2022-12-16 PROCEDURE — 99214 OFFICE O/P EST MOD 30 MIN: CPT | Performed by: SPECIALIST

## 2022-12-16 RX ORDER — HYDROCHLOROTHIAZIDE 25 MG/1
12.5 TABLET ORAL EVERY MORNING
Qty: 45 TABLET | Refills: 3 | Status: SHIPPED | OUTPATIENT
Start: 2022-12-16

## 2023-02-24 ENCOUNTER — PROCEDURE VISIT (OUTPATIENT)
Dept: UROLOGY | Facility: CLINIC | Age: 88
End: 2023-02-24
Payer: MEDICARE

## 2023-02-24 ENCOUNTER — PREP FOR SURGERY (OUTPATIENT)
Dept: OTHER | Facility: HOSPITAL | Age: 88
End: 2023-02-24
Payer: MEDICARE

## 2023-02-24 VITALS — BODY MASS INDEX: 36.64 KG/M2 | WEIGHT: 247.4 LBS | HEIGHT: 69 IN

## 2023-02-24 DIAGNOSIS — C67.8 MALIGNANT NEOPLASM OF OVERLAPPING SITES OF BLADDER: Primary | ICD-10-CM

## 2023-02-24 LAB
BILIRUB BLD-MCNC: NEGATIVE MG/DL
CLARITY, POC: CLEAR
COLOR UR: YELLOW
EXPIRATION DATE: ABNORMAL
GLUCOSE UR STRIP-MCNC: NEGATIVE MG/DL
KETONES UR QL: NEGATIVE
LEUKOCYTE EST, POC: ABNORMAL
Lab: ABNORMAL
NITRITE UR-MCNC: NEGATIVE MG/ML
PH UR: 6 [PH] (ref 5–8)
PROT UR STRIP-MCNC: NEGATIVE MG/DL
RBC # UR STRIP: ABNORMAL /UL
SP GR UR: 1.01 (ref 1–1.03)
UROBILINOGEN UR QL: ABNORMAL

## 2023-02-24 PROCEDURE — 52000 CYSTOURETHROSCOPY: CPT | Performed by: UROLOGY

## 2023-02-24 PROCEDURE — 81003 URINALYSIS AUTO W/O SCOPE: CPT | Performed by: UROLOGY

## 2023-02-24 RX ORDER — LEVOFLOXACIN 5 MG/ML
500 INJECTION, SOLUTION INTRAVENOUS ONCE
Status: CANCELLED | OUTPATIENT
Start: 2023-02-24 | End: 2023-02-24

## 2023-02-24 RX ORDER — LEVOFLOXACIN 250 MG/1
TABLET ORAL EVERY 24 HOURS
COMMUNITY
End: 2023-02-24

## 2023-02-24 RX ORDER — SODIUM CHLORIDE 9 MG/ML
100 INJECTION, SOLUTION INTRAVENOUS CONTINUOUS
Status: CANCELLED | OUTPATIENT
Start: 2023-02-24

## 2023-02-24 NOTE — H&P
[facility]   Urology HISTORY AND PHYSICAL    Patient Name: Toney Neri  : 1935  MRN: [id number]  Primary Care Physician:  Una Neves APRN  Date of admission: (Not on file)    Subjective   Subjective       History of Present Illness  Patient has a history of bladder cancer, a lesion on the dome of his bladder and presents for cystoscopy, bilateral retrograde pyelograms and possible transurethral resection of bladder tumor.       Personal History     Past Medical History:   Diagnosis Date   • Acid reflux    • Arthritis    • Atrial fibrillation (HCC)     CHALLAPPA FOLLOWS PT,   • Coronary artery disease     NO INTERVENTION   • Diabetes mellitus, type 2 (HCC)     METFORMIN   • Elevated PSA    • High cholesterol    • Hypertension    • Limb swelling    • Myocardial infarction (HCC)     NO INTERVENTION, APPROX    • Skin lesion     RECENT SKIN CA REMOVED FROM FACE   • Urinary stream slowing    • Urothelial carcinoma of bladder (HCC) 2021       Past Surgical History:   Procedure Laterality Date   • ANKLE SURGERY Right     ankle repair   • BICEPS TENDON REPAIR Right    • COLONOSCOPY     • CYSTOSCOPY     • CYSTOSCOPY RETROGRADE PYELOGRAM Bilateral 2022    Procedure: CYSTOSCOPY RETROGRADE PYELOGRAM;  Surgeon: Yoli Le MD;  Location: [facility];  Service: Urology;  Laterality: Bilateral;   • ENDOSCOPY  2018   • GALLBLADDER SURGERY     • ROTATOR CUFF REPAIR Bilateral 1988   • TONSILLECTOMY     • TRANSURETHRAL RESECTION OF BLADDER TUMOR N/A 2021    Procedure: CYSTOSCOPY TRANSURETHRAL RESECTION OF BLADDER TUMOR;  Surgeon: Yoli Le MD;  Location: [facility] OR;  Service: Urology;  Laterality: N/A;   • TRANSURETHRAL RESECTION OF BLADDER TUMOR N/A 2022    Procedure: CYSTOSCOPY TRANSURETHRAL RESECTION OF BLADDER TUMOR;  Surgeon: Yoli Le MD;  Location: [facility] OR;  Service: Urology;  Laterality: N/A;   • TRANSURETHRAL RESECTION OF  BLADDER TUMOR N/A 11/10/2022    Procedure: CYSTOSCOPY TRANSURETHRAL RESECTION OF BLADDER TUMOR;  Surgeon: Yoli Le MD;  Location: [facility] MAIN OR;  Service: Urology;  Laterality: N/A;       Family History: family history includes Arthritis in his father and mother; Diabetes in an other family member; Heart disease in his mother; Prostate cancer in his father and son. Otherwise pertinent FHx was reviewed and not pertinent to current issue.    Social History:  reports that he has quit smoking. His smoking use included cigarettes. He has never used smokeless tobacco. He reports current alcohol use. He reports that he does not use drugs.    Home Medications:  Magnesium Oxide, NIFEdipine XL, acetaminophen, albuterol sulfate HFA, apixaban, aspirin, atenolol, carboxymethylcellulose, finasteride, fluticasone, hydroCHLOROthiazide, loratadine, meclizine, melatonin, metFORMIN, pantoprazole, potassium chloride, simvastatin, tamsulosin, and vitamin C    Allergies:  No Known Allergies    Objective    Objective     Vitals:        Physical Exam  Constitutional:       Appearance: Normal appearance.   Cardiovascular:      Rate and Rhythm: Normal rate and regular rhythm.   Pulmonary:      Effort: Pulmonary effort is normal.      Breath sounds: Normal breath sounds.   Neurological:      Mental Status: He is alert. Mental status is at baseline.   Psychiatric:         Mood and Affect: Mood and affect normal.         Speech: Speech normal.         Judgment: Judgment normal.         Result Review    Result Review:  I have personally reviewed the results from the time of this admission to 2/24/2023 14:49 EST and agree with these findings:  [x]  Laboratory  []  Microbiology  [x]  Radiology  []  EKG/Telemetry   []  Cardiology/Vascular   [x]  Pathology  [x]  Old records  []  Other:      Assessment & Plan   Assessment / Plan       Active Hospital Problems:  There are no active hospital problems to display for this patient.      Plan:  cystoscopy, bilateral retrograde pyelograms and possible transurethral resection of bladder tumor  Risks and benefits discussed with patient and they are agreeable to proceed.    DVT prophylaxis:  No DVT prophylaxis order currently exists.    CODE STATUS:           Electronically signed by Yoli Le MD, 02/24/23, 2:49 PM EST.

## 2023-02-24 NOTE — H&P (VIEW-ONLY)
[facility]   Urology HISTORY AND PHYSICAL    Patient Name: Toney Neri  : 1935  MRN: [id number]  Primary Care Physician:  Una Neves APRN  Date of admission: (Not on file)    Subjective   Subjective       History of Present Illness  Patient has a history of bladder cancer, a lesion on the dome of his bladder and presents for cystoscopy, bilateral retrograde pyelograms and possible transurethral resection of bladder tumor.       Personal History     Past Medical History:   Diagnosis Date   • Acid reflux    • Arthritis    • Atrial fibrillation (HCC)     CHALLAPPA FOLLOWS PT,   • Coronary artery disease     NO INTERVENTION   • Diabetes mellitus, type 2 (HCC)     METFORMIN   • Elevated PSA    • High cholesterol    • Hypertension    • Limb swelling    • Myocardial infarction (HCC)     NO INTERVENTION, APPROX    • Skin lesion     RECENT SKIN CA REMOVED FROM FACE   • Urinary stream slowing    • Urothelial carcinoma of bladder (HCC) 2021       Past Surgical History:   Procedure Laterality Date   • ANKLE SURGERY Right     ankle repair   • BICEPS TENDON REPAIR Right    • COLONOSCOPY     • CYSTOSCOPY     • CYSTOSCOPY RETROGRADE PYELOGRAM Bilateral 2022    Procedure: CYSTOSCOPY RETROGRADE PYELOGRAM;  Surgeon: Yoli Le MD;  Location: [facility];  Service: Urology;  Laterality: Bilateral;   • ENDOSCOPY  2018   • GALLBLADDER SURGERY     • ROTATOR CUFF REPAIR Bilateral 1988   • TONSILLECTOMY     • TRANSURETHRAL RESECTION OF BLADDER TUMOR N/A 2021    Procedure: CYSTOSCOPY TRANSURETHRAL RESECTION OF BLADDER TUMOR;  Surgeon: Yoli Le MD;  Location: [facility] OR;  Service: Urology;  Laterality: N/A;   • TRANSURETHRAL RESECTION OF BLADDER TUMOR N/A 2022    Procedure: CYSTOSCOPY TRANSURETHRAL RESECTION OF BLADDER TUMOR;  Surgeon: Yoli Le MD;  Location: [facility] OR;  Service: Urology;  Laterality: N/A;   • TRANSURETHRAL RESECTION OF  BLADDER TUMOR N/A 11/10/2022    Procedure: CYSTOSCOPY TRANSURETHRAL RESECTION OF BLADDER TUMOR;  Surgeon: Yoli Le MD;  Location: [facility] MAIN OR;  Service: Urology;  Laterality: N/A;       Family History: family history includes Arthritis in his father and mother; Diabetes in an other family member; Heart disease in his mother; Prostate cancer in his father and son. Otherwise pertinent FHx was reviewed and not pertinent to current issue.    Social History:  reports that he has quit smoking. His smoking use included cigarettes. He has never used smokeless tobacco. He reports current alcohol use. He reports that he does not use drugs.    Home Medications:  Magnesium Oxide, NIFEdipine XL, acetaminophen, albuterol sulfate HFA, apixaban, aspirin, atenolol, carboxymethylcellulose, finasteride, fluticasone, hydroCHLOROthiazide, loratadine, meclizine, melatonin, metFORMIN, pantoprazole, potassium chloride, simvastatin, tamsulosin, and vitamin C    Allergies:  No Known Allergies    Objective    Objective     Vitals:        Physical Exam  Constitutional:       Appearance: Normal appearance.   Cardiovascular:      Rate and Rhythm: Normal rate and regular rhythm.   Pulmonary:      Effort: Pulmonary effort is normal.      Breath sounds: Normal breath sounds.   Neurological:      Mental Status: He is alert. Mental status is at baseline.   Psychiatric:         Mood and Affect: Mood and affect normal.         Speech: Speech normal.         Judgment: Judgment normal.         Result Review    Result Review:  I have personally reviewed the results from the time of this admission to 2/24/2023 14:49 EST and agree with these findings:  [x]  Laboratory  []  Microbiology  [x]  Radiology  []  EKG/Telemetry   []  Cardiology/Vascular   [x]  Pathology  [x]  Old records  []  Other:      Assessment & Plan   Assessment / Plan       Active Hospital Problems:  There are no active hospital problems to display for this patient.      Plan:  cystoscopy, bilateral retrograde pyelograms and possible transurethral resection of bladder tumor  Risks and benefits discussed with patient and they are agreeable to proceed.    DVT prophylaxis:  No DVT prophylaxis order currently exists.    CODE STATUS:           Electronically signed by Yoli Le MD, 02/24/23, 2:49 PM EST.

## 2023-02-24 NOTE — PROGRESS NOTES
Cystoscopy    Date/Time: 2/24/2023 10:39 AM  Performed by: Yoli Le MD  Authorized by: Yoli eL MD   Preparation: Patient was prepped and draped in the usual sterile fashion.  Local anesthesia used: no    Anesthesia:  Local anesthesia used: no    Sedation:  Patient sedated: no    Patient tolerance: patient tolerated the procedure well with no immediate complications  Comments: The patient presents for follow-up of superficial bladder cancer.     The patient was last seen three months ago . The patient is scheduled for repeat cysto and u/a . Cystoscopy on the last visit showed low grade superficial UC.     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      Cystoscopy in June 2021 was normal with no residual tumor seen.    Cysto in Dec 2021 was normal with no tumor seen.   Cysto in Aug 2022 with erythema  Cysto/TURBT in Nov 2022 negative for tumor        Cytoscopy Procedure:     Procedure: Flexible cytoscope was passed per urethra into the bladder without difficulty after proper consent. The bladder was inspected in a systematic meridian fashion. There was a small papillary appearing tumor at the dome.  No other tumors were seen. Of note, there was no increased vascularity as well. Both ureteral orifices were identified and were normal in appearance. The flexible cytoscope was removed. The patient tolerated the procedure well.

## 2023-02-28 ENCOUNTER — TELEPHONE (OUTPATIENT)
Dept: UROLOGY | Facility: CLINIC | Age: 88
End: 2023-02-28
Payer: MEDICARE

## 2023-02-28 NOTE — TELEPHONE ENCOUNTER
Spoke with patient and scheduled his surgery for 3/21/23. I went over prop instructions and told him I would be mailing the information to him. I told him to hold his eliquis 2 days prior to surgery and continue aspirin as he has done in the past per Dr. Le. He verbalized understanding.

## 2023-03-16 NOTE — PRE-PROCEDURE INSTRUCTIONS
Patient instructed to have no food past midnight, clears up to 2 hours prior to arrival time. Patient instructed to wear no lotions, jewelry or piercing's day of surgery. PATIENT TO BRING MEDICATIONS DAY OF SURGERY. PATIENT UNABLE TO VERYIFY ON PHONE.

## 2023-03-17 ENCOUNTER — ANESTHESIA EVENT (OUTPATIENT)
Dept: PERIOP | Facility: HOSPITAL | Age: 88
End: 2023-03-17
Payer: MEDICARE

## 2023-03-21 ENCOUNTER — APPOINTMENT (OUTPATIENT)
Dept: GENERAL RADIOLOGY | Facility: HOSPITAL | Age: 88
End: 2023-03-21
Payer: MEDICARE

## 2023-03-21 ENCOUNTER — HOSPITAL ENCOUNTER (OUTPATIENT)
Facility: HOSPITAL | Age: 88
Setting detail: HOSPITAL OUTPATIENT SURGERY
Discharge: HOME | End: 2023-03-21
Attending: UROLOGY | Admitting: UROLOGY
Payer: MEDICARE

## 2023-03-21 ENCOUNTER — ANESTHESIA (OUTPATIENT)
Dept: PERIOP | Facility: HOSPITAL | Age: 88
End: 2023-03-21
Payer: MEDICARE

## 2023-03-21 VITALS
RESPIRATION RATE: 16 BRPM | WEIGHT: 248.24 LBS | DIASTOLIC BLOOD PRESSURE: 80 MMHG | TEMPERATURE: 97.8 F | SYSTOLIC BLOOD PRESSURE: 162 MMHG | BODY MASS INDEX: 36.77 KG/M2 | HEIGHT: 69 IN | HEART RATE: 75 BPM | OXYGEN SATURATION: 96 %

## 2023-03-21 DIAGNOSIS — C67.8 MALIGNANT NEOPLASM OF OVERLAPPING SITES OF BLADDER: ICD-10-CM

## 2023-03-21 LAB
GLUCOSE BLDC GLUCOMTR-MCNC: 130 MG/DL (ref 70–99)
GLUCOSE BLDC GLUCOMTR-MCNC: 155 MG/DL (ref 70–99)

## 2023-03-21 PROCEDURE — C1758 CATHETER, URETERAL: HCPCS | Performed by: UROLOGY

## 2023-03-21 PROCEDURE — 88305 TISSUE EXAM BY PATHOLOGIST: CPT | Performed by: UROLOGY

## 2023-03-21 PROCEDURE — 52005 CYSTO W/URTRL CATHJ: CPT | Performed by: UROLOGY

## 2023-03-21 PROCEDURE — 25010000002 FENTANYL CITRATE (PF) 50 MCG/ML SOLUTION: Performed by: NURSE ANESTHETIST, CERTIFIED REGISTERED

## 2023-03-21 PROCEDURE — 25010000002 METOCLOPRAMIDE PER 10 MG: Performed by: ANESTHESIOLOGY

## 2023-03-21 PROCEDURE — 25010000002 MIDAZOLAM PER 1MG: Performed by: ANESTHESIOLOGY

## 2023-03-21 PROCEDURE — 25010000002 LEVOFLOXACIN PER 250 MG: Performed by: UROLOGY

## 2023-03-21 PROCEDURE — 74420 UROGRAPHY RTRGR +-KUB: CPT

## 2023-03-21 PROCEDURE — 25010000002 PROPOFOL 10 MG/ML EMULSION: Performed by: NURSE ANESTHETIST, CERTIFIED REGISTERED

## 2023-03-21 PROCEDURE — 52224 CYSTOSCOPY AND TREATMENT: CPT | Performed by: UROLOGY

## 2023-03-21 PROCEDURE — 82962 GLUCOSE BLOOD TEST: CPT

## 2023-03-21 RX ORDER — PROPOFOL 10 MG/ML
VIAL (ML) INTRAVENOUS AS NEEDED
Status: DISCONTINUED | OUTPATIENT
Start: 2023-03-21 | End: 2023-03-21 | Stop reason: SURG

## 2023-03-21 RX ORDER — OXYCODONE HYDROCHLORIDE 5 MG/1
5 TABLET ORAL
Status: DISCONTINUED | OUTPATIENT
Start: 2023-03-21 | End: 2023-03-21 | Stop reason: HOSPADM

## 2023-03-21 RX ORDER — MIDAZOLAM HYDROCHLORIDE 2 MG/2ML
2 INJECTION, SOLUTION INTRAMUSCULAR; INTRAVENOUS ONCE
Status: COMPLETED | OUTPATIENT
Start: 2023-03-21 | End: 2023-03-21

## 2023-03-21 RX ORDER — IBUPROFEN 600 MG/1
600 TABLET ORAL EVERY 6 HOURS PRN
Status: DISCONTINUED | OUTPATIENT
Start: 2023-03-21 | End: 2023-03-21 | Stop reason: HOSPADM

## 2023-03-21 RX ORDER — LEVOFLOXACIN 5 MG/ML
500 INJECTION, SOLUTION INTRAVENOUS ONCE
Status: COMPLETED | OUTPATIENT
Start: 2023-03-21 | End: 2023-03-21

## 2023-03-21 RX ORDER — SODIUM CHLORIDE 9 MG/ML
100 INJECTION, SOLUTION INTRAVENOUS CONTINUOUS
Status: DISCONTINUED | OUTPATIENT
Start: 2023-03-21 | End: 2023-03-21 | Stop reason: HOSPADM

## 2023-03-21 RX ORDER — ONDANSETRON 2 MG/ML
4 INJECTION INTRAMUSCULAR; INTRAVENOUS ONCE AS NEEDED
Status: DISCONTINUED | OUTPATIENT
Start: 2023-03-21 | End: 2023-03-21 | Stop reason: HOSPADM

## 2023-03-21 RX ORDER — PROMETHAZINE HYDROCHLORIDE 12.5 MG/1
12.5 TABLET ORAL ONCE AS NEEDED
Status: DISCONTINUED | OUTPATIENT
Start: 2023-03-21 | End: 2023-03-21 | Stop reason: HOSPADM

## 2023-03-21 RX ORDER — MAGNESIUM HYDROXIDE 1200 MG/15ML
LIQUID ORAL AS NEEDED
Status: DISCONTINUED | OUTPATIENT
Start: 2023-03-21 | End: 2023-03-21 | Stop reason: HOSPADM

## 2023-03-21 RX ORDER — PROMETHAZINE HYDROCHLORIDE 25 MG/1
25 SUPPOSITORY RECTAL ONCE AS NEEDED
Status: DISCONTINUED | OUTPATIENT
Start: 2023-03-21 | End: 2023-03-21 | Stop reason: HOSPADM

## 2023-03-21 RX ORDER — FENTANYL CITRATE 50 UG/ML
INJECTION, SOLUTION INTRAMUSCULAR; INTRAVENOUS AS NEEDED
Status: DISCONTINUED | OUTPATIENT
Start: 2023-03-21 | End: 2023-03-21 | Stop reason: SURG

## 2023-03-21 RX ORDER — METOCLOPRAMIDE HYDROCHLORIDE 5 MG/ML
10 INJECTION INTRAMUSCULAR; INTRAVENOUS ONCE AS NEEDED
Status: COMPLETED | OUTPATIENT
Start: 2023-03-21 | End: 2023-03-21

## 2023-03-21 RX ORDER — ACETAMINOPHEN 500 MG
1000 TABLET ORAL ONCE
Status: COMPLETED | OUTPATIENT
Start: 2023-03-21 | End: 2023-03-21

## 2023-03-21 RX ORDER — PROMETHAZINE HYDROCHLORIDE 12.5 MG/1
25 TABLET ORAL ONCE AS NEEDED
Status: DISCONTINUED | OUTPATIENT
Start: 2023-03-21 | End: 2023-03-21 | Stop reason: HOSPADM

## 2023-03-21 RX ORDER — ACETAMINOPHEN 325 MG/1
650 TABLET ORAL ONCE
Status: DISCONTINUED | OUTPATIENT
Start: 2023-03-21 | End: 2023-03-21 | Stop reason: HOSPADM

## 2023-03-21 RX ORDER — SODIUM CHLORIDE, SODIUM LACTATE, POTASSIUM CHLORIDE, CALCIUM CHLORIDE 600; 310; 30; 20 MG/100ML; MG/100ML; MG/100ML; MG/100ML
9 INJECTION, SOLUTION INTRAVENOUS CONTINUOUS PRN
Status: DISCONTINUED | OUTPATIENT
Start: 2023-03-21 | End: 2023-03-21 | Stop reason: HOSPADM

## 2023-03-21 RX ORDER — LIDOCAINE HYDROCHLORIDE 20 MG/ML
INJECTION, SOLUTION EPIDURAL; INFILTRATION; INTRACAUDAL; PERINEURAL AS NEEDED
Status: DISCONTINUED | OUTPATIENT
Start: 2023-03-21 | End: 2023-03-21 | Stop reason: SURG

## 2023-03-21 RX ADMIN — MIDAZOLAM HYDROCHLORIDE 2 MG: 1 INJECTION, SOLUTION INTRAMUSCULAR; INTRAVENOUS at 10:57

## 2023-03-21 RX ADMIN — SODIUM CHLORIDE, POTASSIUM CHLORIDE, SODIUM LACTATE AND CALCIUM CHLORIDE 9 ML/HR: 600; 310; 30; 20 INJECTION, SOLUTION INTRAVENOUS at 12:24

## 2023-03-21 RX ADMIN — LIDOCAINE HYDROCHLORIDE 80 MG: 20 INJECTION, SOLUTION EPIDURAL; INFILTRATION; INTRACAUDAL; PERINEURAL at 11:18

## 2023-03-21 RX ADMIN — PROPOFOL 30 MG: 10 INJECTION, EMULSION INTRAVENOUS at 11:12

## 2023-03-21 RX ADMIN — METOCLOPRAMIDE HYDROCHLORIDE 10 MG: 5 INJECTION INTRAMUSCULAR; INTRAVENOUS at 10:57

## 2023-03-21 RX ADMIN — FENTANYL CITRATE 50 MCG: 50 INJECTION, SOLUTION INTRAMUSCULAR; INTRAVENOUS at 11:25

## 2023-03-21 RX ADMIN — LIDOCAINE HYDROCHLORIDE 20 MG: 20 INJECTION, SOLUTION EPIDURAL; INFILTRATION; INTRACAUDAL; PERINEURAL at 11:19

## 2023-03-21 RX ADMIN — LEVOFLOXACIN 500 MG: 5 INJECTION, SOLUTION INTRAVENOUS at 11:12

## 2023-03-21 RX ADMIN — ACETAMINOPHEN 1000 MG: 500 TABLET ORAL at 09:43

## 2023-03-21 RX ADMIN — PROPOFOL 30 MG: 10 INJECTION, EMULSION INTRAVENOUS at 11:10

## 2023-03-21 RX ADMIN — SODIUM CHLORIDE, POTASSIUM CHLORIDE, SODIUM LACTATE AND CALCIUM CHLORIDE 9 ML/HR: 600; 310; 30; 20 INJECTION, SOLUTION INTRAVENOUS at 09:43

## 2023-03-21 RX ADMIN — PROPOFOL 50 MG: 10 INJECTION, EMULSION INTRAVENOUS at 11:19

## 2023-03-21 RX ADMIN — PROPOFOL 50 MCG/KG/MIN: 10 INJECTION, EMULSION INTRAVENOUS at 11:12

## 2023-03-21 NOTE — OP NOTE
CYSTOSCOPY RETROGRADE PYELOGRAM, CYSTOSCOPY TRANSURETHRAL RESECTION OF BLADDER TUMOR  Procedure Report    Patient Name:  Toney Neri  YOB: 1935    Date of Surgery:  3/21/2023     Pre-op Diagnosis:   Malignant neoplasm of overlapping sites of bladder (HCC) [C67.8]       Post-Op Diagnosis Codes:     * Malignant neoplasm of overlapping sites of bladder (HCC) [C67.8]      Procedure/CPT® Codes:    Procedure(s):  CYSTOSCOPY BILATERAL RETROGRADE PYELOGRAM  CYSTOSCOPY , BLADDER BIOPSY AND FULGURATION      Staff:  Surgeon(s):  Yoli Le MD       Anesthesia: Sedation    Estimated Blood Loss: 2 mL    Implants:    Nothing was implanted during the procedure    Specimen:          Specimens     ID Source Type Tests Collected By Collected At Frozen?    A Urinary Bladder Tissue · TISSUE PATHOLOGY EXAM   Yoli Le MD 3/21/23 [id number]     Description: Bladder Biopsy              Complications: None    Description of Procedure:     After proper consent was obtained, patient was taken to operating room and MAC anesthesia was performed.  The patient was placed in the dorsal lithotomy position and prepped and draped in the normal sterile fashion for cystoscopy.       A 22 Armenian rigid cystoscope was inserted into the bladder.  The bladder was inspected in a systemic meridian fashion using a 30 degree lens.  There were no tumors, lesions, stones or other abnormalities seen except for some papillary changes seen near areas of previous biopsy on the dome.  Both ureteral orifices were normal in appearance.       Bilateral retrograde pyelograms were then performed, first on the patient's left side and then on the right.  There were no filling defects, tumors, stones or other abnormalities seen.       Biopsies were taken of the suspicious areas using biopsy forceps.  The areas were then fulgerated with a bugbee.  There areas were less than 0.5cm in size.       The bladder was emptied and the cystoscope  removed.       The patient tolerated the procedure well and was transferred to the PACU in stable condition.        Yoli Le MD     Date: 3/21/2023  Time: 12:14 EDT

## 2023-03-21 NOTE — ANESTHESIA PREPROCEDURE EVALUATION
Anesthesia Evaluation     Patient summary reviewed and Nursing notes reviewed   no history of anesthetic complications:  NPO Solid Status: > 8 hours  NPO Liquid Status: > 2 hours           Airway   Mallampati: II  TM distance: >3 FB  Neck ROM: full  No difficulty expected  Dental      Pulmonary - normal exam    breath sounds clear to auscultation  (+) sleep apnea,   Cardiovascular   Exercise tolerance: good (4-7 METS)    ECG reviewed  Rhythm: irregular  Rate: normal    (+) hypertension, past MI , CAD, dysrhythmias, hyperlipidemia,       Neuro/Psych- negative ROS  GI/Hepatic/Renal/Endo    (+)  GERD,  diabetes mellitus type 2,     Musculoskeletal     Abdominal    Substance History - negative use     OB/GYN negative ob/gyn ROS         Other   arthritis,    history of cancer    ROS/Med Hx Other: >4METS, HX MI 2010, AFIB, DM, CD, TOMI. ECHO 2/20 EF 55%, MILD MR, STRESS 2/20 EF 48%, NO ISCHMEIA. CARD OV 12/16/22 F/U 6MTH[facility]. PREV. CARDS CLEARANCE 9/22 FOR CYSTO,TURBT.  KT        ABNORMAL ECG - 4/27//22  Atrial fibrillation  Inferior infarct, old  Anterior infarct, old  When compared with ECG of 30-Apr-2021 20:58:44,         Anesthesia Plan    ASA 4     general     (Patient understands anesthesia not responsible for dental damage.)  intravenous induction     Anesthetic plan, risks, benefits, and alternatives have been provided, discussed and informed consent has been obtained with: patient.    Use of blood products discussed with patient .   Plan discussed with CRNA.        CODE STATUS:

## 2023-03-21 NOTE — DISCHARGE INSTRUCTIONS
DISCHARGE INSTRUCTIONS CYSTOSCOPY      For your surgery you had:  General anesthesia (you may have a sore throat for the first 24 hours)  IV sedation  Local anesthesia  Monitored anesthesia care  You received a medicated patch for nausea prevention today (behind your ear). It is recommended that you remove it 24-48 hours post-operatively. It must be removed within 72 hours.  You have received an anesthesia medication today that can cause hormonal forms of birth control to be ineffective. You should use a different form of birth control (to prevent pregnancy) for 7 days.   You may experience dizziness, drowsiness, or lightheadedness for several hours following surgery.  Do not stay alone today or tonight.  Limit your activity for 24 hours.  You should not drive, operate machinery, drink alcohol, or sign legally binding documents for 24 hours or while you are taking pain medication.  Resume your diet slowly.  Follow any special dietary instructions you may have been given by your doctor.  Last dose of pain medication given at: tylenol@9:43  .  NOTIFY YOUR DOCTOR IF YOU EXPERIENCE ANY OF THE FOLLOWING:  Temperature greater than 101 degrees Fahrenheit  Shaking Chills  Redness or excessive drainage from incision  Nausea, vomiting and/or pain that is not controlled by prescribed medications  Increase in bleeding or bleeding that is excessive  Unable to urinate in 6 hours after surgery  If unable to reach your doctor, please go to the closest Emergency Room   Following your cystoscopy exam, you may experience burning upon urination.  You may also pass some bloody urine.  If the burning sensation and/or bloody urine should persist beyond 48 hours, call your doctor.  To encourage kidney and bladder function, you should drink as much fluid as possible.  If you have difficulty urinating, try sitting in a bath tub of warm water.  If you become uncomfortable because you cannot urinate, call your doctor or come to the Emergency  Room at the hospital.  Medications per physician instructions as indicated on Discharge Medication Information Sheet.

## 2023-03-21 NOTE — ANESTHESIA POSTPROCEDURE EVALUATION
Patient: Toney Neri    Procedure Summary     Date: 03/21/23 Room / Location: [facility] OR 07 / [facility] MAIN OR    Anesthesia Start: 1104 Anesthesia Stop: 1147    Procedures:       CYSTOSCOPY RETROGRADE PYELOGRAM (Bilateral)      CYSTOSCOPY TRANSURETHRAL RESECTION OF BLADDER TUMOR Diagnosis:       Malignant neoplasm of overlapping sites of bladder (HCC)      (Malignant neoplasm of overlapping sites of bladder (HCC) [C67.8])    Surgeons: Yoli Le MD Provider: Enoch Thorpe MD    Anesthesia Type: general ASA Status: 4          Anesthesia Type: general    Vitals  Vitals Value Taken Time   /77 03/21/23 1230   Temp 36.4 °C (97.6 °F) 03/21/23 1220   Pulse 68 03/21/23 1233   Resp 16 03/21/23 1230   SpO2 89 % 03/21/23 1233   Vitals shown include unvalidated device data.        Post Anesthesia Care and Evaluation    Patient location during evaluation: bedside  Patient participation: complete - patient participated  Level of consciousness: awake  Pain management: adequate    Airway patency: patent  PONV Status: none  Cardiovascular status: acceptable  Respiratory status: acceptable  Hydration status: acceptable    Comments: An Anesthesiologist personally participated in the most demanding procedures (including induction and emergence if applicable) in the anesthesia plan, monitored the course of anesthesia administration at frequent intervals and remained physically present and available for immediate diagnosis and treatment of emergencies.

## 2023-03-23 ENCOUNTER — TELEPHONE (OUTPATIENT)
Dept: UROLOGY | Facility: CLINIC | Age: 88
End: 2023-03-23
Payer: MEDICARE

## 2023-03-23 NOTE — TELEPHONE ENCOUNTER
Spoke with patient and scheduled his next office cystoscopy/follow up with Dr. Le on 7/26/23 at 1:00. He verbalized understanding.

## 2023-03-23 NOTE — PROGRESS NOTES
Called patient.  His biopsy was negative.  He is doing well.  We can cancel his appt for next week.  He will need a cysto in office in 3 to 4 months.  Please call him to schedule. Thanks.

## 2023-06-21 ENCOUNTER — TELEPHONE (OUTPATIENT)
Dept: CARDIOLOGY | Facility: CLINIC | Age: 88
End: 2023-06-21

## 2023-06-21 NOTE — TELEPHONE ENCOUNTER
"The [facility] received a fax that requires your attention. The document has been indexed to the patient’s chart for your review.      Reason for sending: EXTERNAL MEDICAL RECORD NOTIFICATION     Documents Description: LAB RESULTS- REF SHEET STATES \"BNP IS HIGH. NOT SURE IF HE WILL NEED TO BE SEEN DUE TO THAT\"    Name of Sender: [facility]     Date Indexed: 6.21.23    "

## 2023-07-26 ENCOUNTER — PROCEDURE VISIT (OUTPATIENT)
Dept: UROLOGY | Facility: CLINIC | Age: 88
End: 2023-07-26
Payer: MEDICARE

## 2023-07-26 VITALS — WEIGHT: 246.8 LBS | HEIGHT: 69 IN | BODY MASS INDEX: 36.56 KG/M2

## 2023-07-26 DIAGNOSIS — C67.8 MALIGNANT NEOPLASM OF OVERLAPPING SITES OF BLADDER: Primary | ICD-10-CM

## 2023-07-26 LAB
BILIRUB BLD-MCNC: ABNORMAL MG/DL
CLARITY, POC: CLEAR
COLOR UR: YELLOW
EXPIRATION DATE: ABNORMAL
GLUCOSE UR STRIP-MCNC: NEGATIVE MG/DL
KETONES UR QL: ABNORMAL
LEUKOCYTE EST, POC: NEGATIVE
Lab: ABNORMAL
NITRITE UR-MCNC: NEGATIVE MG/ML
PH UR: 5.5 [PH] (ref 5–8)
PROT UR STRIP-MCNC: ABNORMAL MG/DL
RBC # UR STRIP: NEGATIVE /UL
SP GR UR: 1.01 (ref 1–1.03)
UROBILINOGEN UR QL: ABNORMAL

## 2023-07-26 NOTE — PROGRESS NOTES
Cystoscopy    Date/Time: 7/26/2023 12:58 PM  Performed by: Yoli Le MD  Authorized by: Yoli Le MD   Preparation: Patient was prepped and draped in the usual sterile fashion.  Local anesthesia used: no    Anesthesia:  Local anesthesia used: no    Sedation:  Patient sedated: no    Patient tolerance: patient tolerated the procedure well with no immediate complications  Comments:  The patient presents for follow-up of superficial bladder cancer.     The patient was last seen three months ago . The patient is scheduled for repeat cysto and u/a . Cystoscopy on the last visit showed low grade superficial UC.     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      Cystoscopy in June 2021 was normal with no residual tumor seen.    Cysto in Dec 2021 was normal with no tumor seen.   Cysto in Aug 2022 with erythema  Cysto/TURBT in Nov 2022 negative for tumor   Cysto/TURBT in March 2023 negative for tumor, retrogrades normal        Cytoscopy Procedure:     Procedure: Flexible cytoscope was passed per urethra into the bladder without difficulty after proper consent. The bladder was inspected in a systematic meridian fashion. There were no tumors, lesions, stones, or other abnormalities noted within the bladder. Of note, there was no increased vascularity as well. Both ureteral orifices were identified and were normal in appearance. The flexible cytoscope was removed. The patient tolerated the procedure well.

## 2023-10-21 NOTE — PROCEDURES
Procedure Note      Patient Name: Toney Neri   Patient ID: [id number]   Sex: Male   YOB: 1935    Primary Care Provider: Alverto Lopez MD   Referring Provider: Alverto Lopez MD    Visit Date: April 21, 2021    Provider: Yoli Le MD   Location: [facility]   Location Address: [address] [place] [address]  [id number]   Location Phone: [phone]          The patient presents for follow-up of superficial bladder cancer.   The patient was last seen three months ago . The patient is scheduled for repeat cysto and u/a . Cystoscopy on the last visit showed low grade superficial UC.   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 The most recent tumor pathology has not been done.   Imaging:  No imaging studies have been done.   Cystoscopy Procedure:  PROCEDURE: Flexible cystoscope was passed per urethra into the bladder without difficulty after proper consent. The bladder was inspected in a systematic meridian fashion. There were two small papillary tumors, one at the dome and one on the trigone. Both ureteral orifices were identified and were normal in appearance. The flexible cystoscope was removed. The patient tolerated the procedure well.           Assessment  · Elevated PSA     790.93/R97.20  · Hematuria     599.70/R31.9  · Bladder cancer     188.9/C67.9      Plan  · Orders  o Cystoscopy (54141) - 599.70/R31.9 - 04/21/2021  · Medications  o Medications have been Reconciled  o Transition of Care or Provider Policy  · Instructions  o TIME OUT PROCEDURE: Correct patient and birth date; Correct procedure; Correct Physician; Consent signed  o Will need a TURBT in the operating room.   o Continue Flomax.             Electronically Signed by: oYli Le MD -Author on April 21, 2021 03:39:39 PM   no

## 2024-02-15 ENCOUNTER — OFFICE VISIT (OUTPATIENT)
Dept: CARDIOLOGY | Facility: CLINIC | Age: 89
End: 2024-02-15
Payer: MEDICARE

## 2024-02-15 VITALS
SYSTOLIC BLOOD PRESSURE: 142 MMHG | DIASTOLIC BLOOD PRESSURE: 70 MMHG | HEIGHT: 69 IN | WEIGHT: 253 LBS | BODY MASS INDEX: 37.47 KG/M2 | HEART RATE: 76 BPM

## 2024-02-15 DIAGNOSIS — E78.2 HYPERLIPEMIA, MIXED: ICD-10-CM

## 2024-02-15 DIAGNOSIS — I48.21 PERMANENT ATRIAL FIBRILLATION: ICD-10-CM

## 2024-02-15 DIAGNOSIS — I10 HYPERTENSION, ESSENTIAL: Primary | ICD-10-CM

## 2024-02-15 PROCEDURE — 99214 OFFICE O/P EST MOD 30 MIN: CPT | Performed by: SPECIALIST

## 2024-02-23 ENCOUNTER — PROCEDURE VISIT (OUTPATIENT)
Dept: UROLOGY | Facility: CLINIC | Age: 89
End: 2024-02-23
Payer: MEDICARE

## 2024-02-23 VITALS
HEIGHT: 69 IN | WEIGHT: 252.4 LBS | DIASTOLIC BLOOD PRESSURE: 57 MMHG | SYSTOLIC BLOOD PRESSURE: 121 MMHG | BODY MASS INDEX: 37.38 KG/M2

## 2024-02-23 DIAGNOSIS — C67.8 MALIGNANT NEOPLASM OF OVERLAPPING SITES OF BLADDER: Primary | ICD-10-CM

## 2024-02-23 LAB
BILIRUB BLD-MCNC: NEGATIVE MG/DL
CLARITY, POC: CLEAR
COLOR UR: YELLOW
EXPIRATION DATE: ABNORMAL
GLUCOSE UR STRIP-MCNC: NEGATIVE MG/DL
KETONES UR QL: NEGATIVE
LEUKOCYTE EST, POC: NEGATIVE
Lab: ABNORMAL
NITRITE UR-MCNC: NEGATIVE MG/ML
PH UR: 6 [PH] (ref 5–8)
PROT UR STRIP-MCNC: ABNORMAL MG/DL
RBC # UR STRIP: ABNORMAL /UL
SP GR UR: 1.01 (ref 1–1.03)
UROBILINOGEN UR QL: ABNORMAL

## 2024-02-23 RX ORDER — FLUNISOLIDE 0.25 MG/ML
SOLUTION NASAL
COMMUNITY
Start: 2023-04-18 | End: 2024-04-16

## 2024-02-23 RX ORDER — POLYVINYL ALCOHOL 14 MG/ML
1 SOLUTION/ DROPS OPHTHALMIC
COMMUNITY

## 2024-02-23 NOTE — PROGRESS NOTES
Cystoscopy    Date/Time: 2/23/2024 11:40 AM    Performed by: Yoli Le MD  Authorized by: Yoli Le MD  Preparation: Patient was prepped and draped in the usual sterile fashion.  Local anesthesia used: no    Anesthesia:  Local anesthesia used: no    Sedation:  Patient sedated: no    Patient tolerance: patient tolerated the procedure well with no immediate complications  Comments:  The patient presents for follow-up of superficial bladder cancer.      The patient was last seen three months ago . The patient is scheduled for repeat cysto and u/a . Cystoscopy on the last visit showed low grade superficial UC.      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       Cystoscopy in June 2021 was normal with no residual tumor seen.    Cysto in Dec 2021 was normal with no tumor seen.   Cysto in Aug 2022 with erythema  Cysto/TURBT in Nov 2022 negative for tumor   Cysto/TURBT in March 2023 negative for tumor, retrogrades normal  Cysto in July 2023 normal  Cysto in Feb 2024 normal        Cytoscopy Procedure:     Procedure: Flexible cytoscope was passed per urethra into the bladder without difficulty after proper consent. The bladder was inspected in a systematic meridian fashion. There were no tumors, lesions, stones, or other abnormalities noted within the bladder. Of note, there was no increased vascularity as well. Both ureteral orifices were identified and were normal in appearance. The flexible cytoscope was removed. The patient tolerated the procedure well.

## 2024-08-30 NOTE — PROGRESS NOTES
[facility]  Cardiology progress Note    Patient Name: Toney Neri  : 1935    CHIEF COMPLAINT  Atrial fibrillation        Subjective   Subjective     HISTORY OF PRESENT ILLNESS    Toney Neri is a 89 y.o. male with history of atrial fibrillation.  No palpitations.    REVIEW OF SYSTEMS    Constitutional:    No fever, no weight loss  Skin:     No rash  Otolaryngeal:    No difficulty swallowing  Cardiovascular: See HPI.  Pulmonary:    No cough, no sputum production    Personal History     Social History:    reports that he has quit smoking. His smoking use included cigarettes. He has never used smokeless tobacco. He reports current alcohol use. He reports that he does not use drugs.    Home Medications:  Current Outpatient Medications on File Prior to Visit   Medication Sig    acetaminophen (TYLENOL) 325 MG tablet Take 1 tablet by mouth Daily.    albuterol sulfate  (90 Base) MCG/ACT inhaler Inhale 1 puff Daily As Needed.    apixaban (Eliquis) 5 MG tablet tablet Take 1 tablet by mouth 2 (two) times a day. PATIENT STATES WAS DIRECTED BY PHYSICIAN TO STOP 2 DAYS PRIOR TO SURGERY DATE.    Aspirin Low Dose 81 MG EC tablet Take 1 tablet by mouth Daily. NO STOP NEEDED ON ASA PER DR. HENDRICKS    atenolol (TENORMIN) 50 MG tablet Take 1 tablet by mouth Every Morning.    carboxymethylcellulose (REFRESH PLUS) 0.5 % solution 1 drop 4 (Four) Times a Day.    finasteride (PROSCAR) 5 MG tablet Take 1 tablet by mouth Daily.    flunisolide (NASALIDE) 25 MCG/ACT (0.025%) solution nasal spray Take or use exactly as directed.For the nose.    fluticasone (FLONASE) 50 MCG/ACT nasal spray 1 spray by Each Nare route Every Morning.    hydroCHLOROthiazide (HYDRODIURIL) 25 MG tablet Take 0.5 tablets by mouth Every Morning.    loratadine (CLARITIN) 10 MG tablet Take 1 tablet by mouth Every Morning.    Magnesium Oxide 400 (240 Mg) MG tablet Take 1 tablet by mouth Daily.    meclizine (ANTIVERT) 25 MG tablet Take 1  tablet by mouth 3 (Three) Times a Day As Needed for Dizziness.    melatonin 3 MG tablet Take 2 tablets by mouth every night at bedtime.    metFORMIN (GLUCOPHAGE) 1000 MG tablet Take 1 tablet by mouth 2 (Two) Times a Day. INST PER ANESTHESIA PROTOCOL    NIFEdipine XL (PROCARDIA XL) 90 MG 24 hr tablet Take 1 tablet by mouth Every Morning.    polyvinyl alcohol (LIQUIFILM) 1.4 % ophthalmic solution Apply 1 drop to eye(s) as directed by provider.    potassium chloride 10 MEQ CR tablet Take 1 tablet by mouth Daily.    simvastatin (ZOCOR) 40 MG tablet Take 1 tablet by mouth Every Night.    tamsulosin (FLOMAX) 0.4 MG capsule 24 hr capsule Take 1 capsule by mouth 2 (Two) Times a Day.    vitamin C (ASCORBIC ACID) 250 MG tablet Take 1 tablet by mouth Daily.     No current facility-administered medications on file prior to visit.       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     PHYSICAL EXAM:    General Appearance:   well developed  well nourished  HENT:   oropharynx moist  lips not cyanotic  Neck:  thyroid not enlarged  supple  Respiratory:  no respiratory distress  normal breath sounds  no rales  Cardiovascular:  no jugular venous distention  regular rhythm  apical impulse normal  S1 normal, S2 normal  no S3, no S4   no murmur  no rub, no thrill  carotid pulses normal; no bruit  pedal pulses normal  lower extremity edema: none    Skin:    warm, dry  Psychiatric:  judgement and insight appropriate  normal mood and affect        Result Review:  I have personally reviewed the available results from  [x]  Laboratory  [x]  EKG  [x]  Cardiology  [x]  Medications  [x]  Old records  []  Other:     Procedures         Impression/Plan:   1.  Permanent atrial fibrillation with controlled heart rate: Continue Eliquis 5 mg twice a day.  Continue atenolol 50 mg once a day for rate control.  Able to tolerate Eliquis without any side effects.  2.  Mixed hyperlipidemia: Continue Zocor 40 mg once a day.  Monitor lipid and hepatic profile.  3.  Essential hypertension controlled: Continue Procardia XL 90 mg once a day.  Monitor blood pressure regularly.           Jacobo Sweet MD   09/03/24   12:18 EDT

## 2024-09-03 ENCOUNTER — OFFICE VISIT (OUTPATIENT)
Dept: CARDIOLOGY | Facility: CLINIC | Age: 89
End: 2024-09-03
Payer: MEDICARE

## 2024-09-03 VITALS
BODY MASS INDEX: 37.03 KG/M2 | HEART RATE: 85 BPM | HEIGHT: 69 IN | WEIGHT: 250 LBS | SYSTOLIC BLOOD PRESSURE: 130 MMHG | DIASTOLIC BLOOD PRESSURE: 70 MMHG

## 2024-09-03 DIAGNOSIS — E78.2 HYPERLIPEMIA, MIXED: ICD-10-CM

## 2024-09-03 DIAGNOSIS — I10 HYPERTENSION, ESSENTIAL: ICD-10-CM

## 2024-09-03 DIAGNOSIS — I48.21 PERMANENT ATRIAL FIBRILLATION: Primary | ICD-10-CM

## 2024-09-03 PROCEDURE — 1159F MED LIST DOCD IN RCRD: CPT | Performed by: SPECIALIST

## 2024-09-03 PROCEDURE — 99214 OFFICE O/P EST MOD 30 MIN: CPT | Performed by: SPECIALIST

## 2024-09-03 PROCEDURE — 1160F RVW MEDS BY RX/DR IN RCRD: CPT | Performed by: SPECIALIST

## 2024-09-16 ENCOUNTER — TELEPHONE (OUTPATIENT)
Dept: UROLOGY | Facility: CLINIC | Age: 89
End: 2024-09-16

## 2024-09-16 NOTE — TELEPHONE ENCOUNTER
Procedure: Cystoscopy, Bilateral Retrograde Pyelograms     Med Directive: Eliquis    PMH:afib, HTN, HLD    Last Seen: 9/3/24

## 2024-09-16 NOTE — TELEPHONE ENCOUNTER
Caller: AMITA ELLSWORTH    Relationship to patient: SELF    Best call back number: [phone]    Chief complaint: INCOMING CALL FROM PT. PT WAS TOLD HE NEEDED TO BE SCHEDULED FOR A CYSTOSCOPY PROCEDURE AT THE HOSPITAL THE LAST TIME HE WAS SEEN. PT CALLING TO CHECK ON THIS.    Type of visit: CYSTOSCOPY    Requested date: N/A     If rescheduling, when is the original appointment:  N/A    Additional notes: N/A

## 2024-09-16 NOTE — TELEPHONE ENCOUNTER
[facility] [place] [facility] [place] [facility]  [address] [place] [address]  Fax [phone]  Phone [phone]       09/16/24            To Whom It May Concern:        Our records indicate this patient is currently under anticoagulant therapy Toney Neri 1935 is to be cleared to hold his eliquis for 2 days prior to Cystoscopy, Bilateral Retrograde Pyelograms. You may contact our office at [phone] with any questions. I appreciate your prompt response in this matter. Please return this form to our office as soon as possible to [phone]. Thank you for your time and assistance.     [] I approve my patient, Toney Neri , to stop taking anticoagulant therapy medication 2 days prior to procedure  [] I do NOT approve my patient, Toney Neri , to stop taking anticoagulant therapy medication at this time.   [] I approve my patient, Toney Neri from a cardiac standpoint  [] I do NOT approve my patient, Toney Neri from a cardiac standpoint      Thank you,          Approving physician name (please print): Jacobo Sweet MD    Approving Physician signature: _________________________________   Date: _____________________      Sincerely,     Yoli Le MD

## 2024-09-19 ENCOUNTER — PREP FOR SURGERY (OUTPATIENT)
Dept: OTHER | Facility: HOSPITAL | Age: 89
End: 2024-09-19
Payer: MEDICARE

## 2024-09-19 ENCOUNTER — TELEPHONE (OUTPATIENT)
Dept: UROLOGY | Facility: CLINIC | Age: 89
End: 2024-09-19
Payer: MEDICARE

## 2024-09-19 DIAGNOSIS — C67.8 MALIGNANT NEOPLASM OF OVERLAPPING SITES OF BLADDER: Primary | ICD-10-CM

## 2024-09-19 RX ORDER — SODIUM CHLORIDE 9 MG/ML
100 INJECTION, SOLUTION INTRAVENOUS CONTINUOUS
OUTPATIENT
Start: 2024-09-19

## 2024-09-19 RX ORDER — SODIUM CHLORIDE 9 MG/ML
40 INJECTION, SOLUTION INTRAVENOUS AS NEEDED
OUTPATIENT
Start: 2024-09-19

## 2024-09-19 RX ORDER — SODIUM CHLORIDE 0.9 % (FLUSH) 0.9 %
10 SYRINGE (ML) INJECTION EVERY 12 HOURS SCHEDULED
OUTPATIENT
Start: 2024-09-19

## 2024-09-19 RX ORDER — SODIUM CHLORIDE 0.9 % (FLUSH) 0.9 %
10 SYRINGE (ML) INJECTION AS NEEDED
OUTPATIENT
Start: 2024-09-19

## 2024-09-19 NOTE — H&P (VIEW-ONLY)
[facility]   Urology HISTORY AND PHYSICAL    Patient Name: Toney Neri  : 1935  MRN: [id number]  Primary Care Physician:  Una Neves APRN  Date of admission: (Not on file)    Subjective   Subjective       History of Present Illness  Patient has history of bladder cancer presents for cystoscopy and bilateral retrograde pyelograms      Personal History     Past Medical History:   Diagnosis Date    Acid reflux     Arthritis     Atrial fibrillation     CHALLAPPA FOLLOWS PT,    Coronary artery disease     NO INTERVENTION    Diabetes mellitus, type 2     METFORMIN    Elevated PSA     High cholesterol     Hypertension     Limb swelling     Myocardial infarction     NO INTERVENTION, APPROX     Skin lesion     RECENT SKIN CA REMOVED FROM FACE    Sleep apnea     Urinary stream slowing     Urothelial carcinoma of bladder 2021       Past Surgical History:   Procedure Laterality Date    ANKLE SURGERY Right     ankle repair    BICEPS TENDON REPAIR Right     COLONOSCOPY      CYSTOSCOPY      CYSTOSCOPY RETROGRADE PYELOGRAM Bilateral 2022    Procedure: CYSTOSCOPY RETROGRADE PYELOGRAM;  Surgeon: Yoli Le MD;  [facility];  Service: Urology;  Laterality: Bilateral;    CYSTOSCOPY RETROGRADE PYELOGRAM Bilateral 3/21/2023    Procedure: CYSTOSCOPY RETROGRADE PYELOGRAM;  Surgeon: Yoli Le MD;  Location: [facility] OR;  Service: Urology;  Laterality: Bilateral;    ENDOSCOPY  2018    GALLBLADDER SURGERY      ROTATOR CUFF REPAIR Bilateral 1988    TONSILLECTOMY      TRANSURETHRAL RESECTION OF BLADDER TUMOR N/A 2021    Procedure: CYSTOSCOPY TRANSURETHRAL RESECTION OF BLADDER TUMOR;  Surgeon: Yoli Le MD;  Location: [facility] OR;  Service: Urology;  Laterality: N/A;    TRANSURETHRAL RESECTION OF BLADDER TUMOR N/A 2022    Procedure: CYSTOSCOPY TRANSURETHRAL RESECTION OF BLADDER TUMOR;  Surgeon: Yoli Le MD;  Location: [facility]  MAIN OR;  Service: Urology;  Laterality: N/A;    TRANSURETHRAL RESECTION OF BLADDER TUMOR N/A 11/10/2022    Procedure: CYSTOSCOPY TRANSURETHRAL RESECTION OF BLADDER TUMOR;  Surgeon: Yoli Le MD;  Location: [facility] MAIN OR;  Service: Urology;  Laterality: N/A;    TRANSURETHRAL RESECTION OF BLADDER TUMOR N/A 3/21/2023    Procedure: CYSTOSCOPY TRANSURETHRAL RESECTION OF BLADDER TUMOR;  Surgeon: Yoli Le MD;  Location: [facility] MAIN OR;  Service: Urology;  Laterality: N/A;       Family History: family history includes Arthritis in his father and mother; Diabetes in an other family member; Heart disease in his mother; Prostate cancer in his father and son. Otherwise pertinent FHx was reviewed and not pertinent to current issue.    Social History:  reports that he has quit smoking. His smoking use included cigarettes. He has never used smokeless tobacco. He reports current alcohol use. He reports that he does not use drugs.    Home Medications:  Magnesium Oxide -Mg Supplement, NIFEdipine XL, acetaminophen, albuterol sulfate HFA, apixaban, aspirin, atenolol, carboxymethylcellulose, finasteride, flunisolide, fluticasone, hydroCHLOROthiazide, loratadine, meclizine, melatonin, metFORMIN, polyvinyl alcohol, potassium chloride, simvastatin, tamsulosin, and vitamin C    Allergies:  No Known Allergies    Objective    Objective     Vitals:        Physical Exam  Constitutional:       Appearance: Normal appearance.   Cardiovascular:      Rate and Rhythm: Normal rate and regular rhythm.   Pulmonary:      Effort: Pulmonary effort is normal.      Breath sounds: Normal breath sounds.   Neurological:      Mental Status: He is alert. Mental status is at baseline.   Psychiatric:         Mood and Affect: Mood and affect normal.         Speech: Speech normal.         Judgment: Judgment normal.         Result Review    Result Review:  I have personally reviewed the results from the time of this admission to 9/19/2024 10:01  EDT and agree with these findings:  [x]  Laboratory  []  Microbiology  [x]  Radiology  []  EKG/Telemetry   []  Cardiology/Vascular   []  Pathology  [x]  Old records  []  Other:      Assessment & Plan   Assessment / Plan       Active Hospital Problems:  There are no active hospital problems to display for this patient.      Plan: cystoscopy and bilateral retrograde pyelograms  Risks and benefits discussed with patient and they are agreeable to proceed.    VTE Prophylaxis:  No VTE prophylaxis order currently exists.        CODE STATUS:           Electronically signed by Yoli Le MD, 09/19/24, 10:01 AM EDT.

## 2024-10-08 RX ORDER — ANTIOX #8/OM3/DHA/EPA/LUT/ZEAX 250-2.5 MG
1 CAPSULE ORAL 2 TIMES DAILY
COMMUNITY
Start: 2024-09-16

## 2024-10-08 NOTE — PRE-PROCEDURE INSTRUCTIONS
ATIENT INSTRUCTED TO BE:    - NOTHING TO EAT AFTER MIDNIGHT OR CHEW, EXCEPT CAN HAVE CLEAR LIQUIDS 2 HOURS PRIOR TO SURGERY ARRIVAL TIME , NO MORE THAN 8 OZ. (NOTHING RED)     - TO HOLD ALL VITAMINS, SUPPLEMENTS, NSAIDS FOR ONE WEEK PRIOR TO THEIR SURGICAL PROCEDURE    - DO NOT TAKE ___________NA___________ 7 DAYS PRIOR TO PROCEDURE PER ANESTHESIA RECOMMENDATIONS/INSTRUCTIONS     - INSTRUCTED PT TO USE SURGICAL SOAP 1 TIME THE NIGHT PRIOR TO SURGERY ___________ OR THE AM OF SURGERY _____________   USE THE SOAP FROM NECK TO TOES, AVOID THEIR FACE, HAIR, AND PRIVATE PARTS. IF USE THE SOAP THE NIGHT PRIOR TO SURGERY, CHANGE BED LINENS AND NO PETS IN THE BED.     INSTRUCTED NO LOTIONS, JEWELRY, PIERCINGS,  NAIL POLISH, OR DEODORANT DAY OF SURGERY    - IF DIABETIC, CHECK BLOOD GLUCOSE IF LESS THAN 70 OR HAVING SYMPTOMS CALL THE PREOP AREA FOR INSTRUCTIONS ON AM OF SURGERY ([phone] )    -INSTRUCTED TO TAKE THE FOLLOWING MEDICATIONS THE DAY OF SURGERY WITH SIPS OF WATER:   ALBUTEROL INHALER AS NEEDED,  TRELEGY INHALER, NIFEDIPINE, TAMSULOSIN, ALBUTEROL, MAGNESIUM, BABY ASA, FINASTERIDE, FLONASE.         - DO NOT BRING ANY MEDICATIONS WITH YOU TO THE HOSPITAL THE DAY OF SURGERY, EXCEPT IF USE INHALERS. BRING INHALERS DAY OF SURGERY       - BRING CPAP OR BIPAP TO THE HOSPITAL ONLY IF YOU ARE SPENDING THE NIGHT    - DO NOT SMOKE OR VAPE 24 HOURS PRIOR TO PROCEDURE PER ANESTHESIA REQUEST     -MAKE SURE YOU HAVE A RIDE HOME OR SOMEONE TO STAY WITH YOU THE DAY OF THE PROCEDURE AFTER YOU GO HOME     - FOLLOW ANY OTHER INSTRUCTIONS GIVEN TO YOU BY YOUR SURGEON'S OFFICE.     - DAY OF SURGERY ____________, COME TO ELEVATOR A, THIRD FLOOR, CHECK IN AT THE DESK FOR REGISTRATION/SURGERY    - YOU WILL RECEIVE A PHONE CALL THE DAY PRIOR TO SURGERY BETWEEN 1PM AND 4 PM WITH ARRIVAL TIME, IF YOUR SURGERY IS ON A MONDAY YOU WILL RECEIVE A CALL THE FRIDAY PRIOR TO SURGERY DATE    - BRING CASH OR CREDIT CARD FOR COPAYMENT OF MEDICATIONS  AFTER SURGERY IF YOU USE THE HOSPITAL PHARMACY ([facility] TO BED)    - PREADMISSION TESTING NURSE VIELKA PEARCE [phone] IF HAVE ANY QUESTIONS     -PATIENT PROVIDED THE NUMBER FOR PREOP SURGICAL DEPT IF HAD QUESTIONS AFTER HOURS PRIOR TO SURGERY ([phone] ).  INFORMED PT IF NO ANSWER, LEAVE A MESSAGE AND SOMEONE WILL RETURN THEIR CALL       PATIENT VERBALIZED UNDERSTANDING

## 2024-10-09 ENCOUNTER — ANESTHESIA EVENT (OUTPATIENT)
Dept: PERIOP | Facility: HOSPITAL | Age: 89
End: 2024-10-09
Payer: MEDICARE

## 2024-10-10 ENCOUNTER — ANESTHESIA (OUTPATIENT)
Dept: PERIOP | Facility: HOSPITAL | Age: 89
End: 2024-10-10
Payer: MEDICARE

## 2024-10-10 ENCOUNTER — APPOINTMENT (OUTPATIENT)
Dept: GENERAL RADIOLOGY | Facility: HOSPITAL | Age: 89
End: 2024-10-10
Payer: MEDICARE

## 2024-10-10 ENCOUNTER — HOSPITAL ENCOUNTER (OUTPATIENT)
Facility: HOSPITAL | Age: 89
Setting detail: HOSPITAL OUTPATIENT SURGERY
Discharge: HOME | End: 2024-10-10
Attending: UROLOGY | Admitting: UROLOGY
Payer: MEDICARE

## 2024-10-10 VITALS
RESPIRATION RATE: 16 BRPM | HEART RATE: 88 BPM | HEIGHT: 69 IN | TEMPERATURE: 97.6 F | WEIGHT: 250 LBS | BODY MASS INDEX: 37.03 KG/M2 | SYSTOLIC BLOOD PRESSURE: 110 MMHG | OXYGEN SATURATION: 91 % | DIASTOLIC BLOOD PRESSURE: 77 MMHG

## 2024-10-10 DIAGNOSIS — C67.8 MALIGNANT NEOPLASM OF OVERLAPPING SITES OF BLADDER: ICD-10-CM

## 2024-10-10 LAB
GLUCOSE BLDC GLUCOMTR-MCNC: 153 MG/DL (ref 70–99)
GLUCOSE BLDC GLUCOMTR-MCNC: 153 MG/DL (ref 70–99)

## 2024-10-10 PROCEDURE — 25010000002 ONDANSETRON PER 1 MG

## 2024-10-10 PROCEDURE — 25010000002 CEFAZOLIN PER 500 MG: Performed by: UROLOGY

## 2024-10-10 PROCEDURE — 52235 CYSTOSCOPY AND TREATMENT: CPT | Performed by: UROLOGY

## 2024-10-10 PROCEDURE — 76000 FLUOROSCOPY <1 HR PHYS/QHP: CPT

## 2024-10-10 PROCEDURE — 25010000002 MIDAZOLAM PER 1MG: Performed by: ANESTHESIOLOGY

## 2024-10-10 PROCEDURE — 25010000002 DEXAMETHASONE PER 1 MG

## 2024-10-10 PROCEDURE — 25010000002 PROPOFOL 10 MG/ML EMULSION

## 2024-10-10 PROCEDURE — 74420 UROGRAPHY RTRGR +-KUB: CPT | Performed by: UROLOGY

## 2024-10-10 PROCEDURE — 25010000002 LIDOCAINE PF 2% 2 % SOLUTION

## 2024-10-10 PROCEDURE — 82948 REAGENT STRIP/BLOOD GLUCOSE: CPT | Performed by: UROLOGY

## 2024-10-10 PROCEDURE — C1758 CATHETER, URETERAL: HCPCS | Performed by: UROLOGY

## 2024-10-10 PROCEDURE — 82948 REAGENT STRIP/BLOOD GLUCOSE: CPT

## 2024-10-10 PROCEDURE — 25810000003 LACTATED RINGERS PER 1000 ML: Performed by: ANESTHESIOLOGY

## 2024-10-10 PROCEDURE — 88307 TISSUE EXAM BY PATHOLOGIST: CPT | Performed by: UROLOGY

## 2024-10-10 PROCEDURE — 25510000001 IOPAMIDOL PER 1 ML: Performed by: UROLOGY

## 2024-10-10 RX ORDER — ACETAMINOPHEN 325 MG/1
650 TABLET ORAL ONCE
Status: DISCONTINUED | OUTPATIENT
Start: 2024-10-10 | End: 2024-10-10

## 2024-10-10 RX ORDER — IBUPROFEN 600 MG/1
600 TABLET, FILM COATED ORAL EVERY 6 HOURS PRN
Status: DISCONTINUED | OUTPATIENT
Start: 2024-10-10 | End: 2024-10-10 | Stop reason: HOSPADM

## 2024-10-10 RX ORDER — OXYCODONE HYDROCHLORIDE 5 MG/1
5 TABLET ORAL
Status: DISCONTINUED | OUTPATIENT
Start: 2024-10-10 | End: 2024-10-10 | Stop reason: HOSPADM

## 2024-10-10 RX ORDER — ALBUTEROL SULFATE 0.83 MG/ML
SOLUTION RESPIRATORY (INHALATION) AS NEEDED
Status: DISCONTINUED | OUTPATIENT
Start: 2024-10-10 | End: 2024-10-10 | Stop reason: SURG

## 2024-10-10 RX ORDER — PROMETHAZINE HYDROCHLORIDE 12.5 MG/1
12.5 TABLET ORAL ONCE AS NEEDED
Status: DISCONTINUED | OUTPATIENT
Start: 2024-10-10 | End: 2024-10-10

## 2024-10-10 RX ORDER — PHENYLEPHRINE HCL IN 0.9% NACL 1 MG/10 ML
SYRINGE (ML) INTRAVENOUS AS NEEDED
Status: DISCONTINUED | OUTPATIENT
Start: 2024-10-10 | End: 2024-10-10 | Stop reason: SURG

## 2024-10-10 RX ORDER — PROPOFOL 10 MG/ML
VIAL (ML) INTRAVENOUS AS NEEDED
Status: DISCONTINUED | OUTPATIENT
Start: 2024-10-10 | End: 2024-10-10 | Stop reason: SURG

## 2024-10-10 RX ORDER — ONDANSETRON 2 MG/ML
INJECTION INTRAMUSCULAR; INTRAVENOUS AS NEEDED
Status: DISCONTINUED | OUTPATIENT
Start: 2024-10-10 | End: 2024-10-10 | Stop reason: SURG

## 2024-10-10 RX ORDER — LIDOCAINE HYDROCHLORIDE 20 MG/ML
INJECTION, SOLUTION EPIDURAL; INFILTRATION; INTRACAUDAL; PERINEURAL AS NEEDED
Status: DISCONTINUED | OUTPATIENT
Start: 2024-10-10 | End: 2024-10-10 | Stop reason: SURG

## 2024-10-10 RX ORDER — SODIUM CHLORIDE 0.9 % (FLUSH) 0.9 %
10 SYRINGE (ML) INJECTION AS NEEDED
Status: DISCONTINUED | OUTPATIENT
Start: 2024-10-10 | End: 2024-10-10 | Stop reason: HOSPADM

## 2024-10-10 RX ORDER — DEXAMETHASONE SODIUM PHOSPHATE 4 MG/ML
INJECTION, SOLUTION INTRA-ARTICULAR; INTRALESIONAL; INTRAMUSCULAR; INTRAVENOUS; SOFT TISSUE AS NEEDED
Status: DISCONTINUED | OUTPATIENT
Start: 2024-10-10 | End: 2024-10-10 | Stop reason: SURG

## 2024-10-10 RX ORDER — SODIUM CHLORIDE 9 MG/ML
100 INJECTION, SOLUTION INTRAVENOUS CONTINUOUS
Status: DISCONTINUED | OUTPATIENT
Start: 2024-10-10 | End: 2024-10-10 | Stop reason: HOSPADM

## 2024-10-10 RX ORDER — MIDAZOLAM HYDROCHLORIDE 2 MG/2ML
0.5 INJECTION, SOLUTION INTRAMUSCULAR; INTRAVENOUS ONCE
Status: COMPLETED | OUTPATIENT
Start: 2024-10-10 | End: 2024-10-10

## 2024-10-10 RX ORDER — PROMETHAZINE HYDROCHLORIDE 12.5 MG/1
25 TABLET ORAL ONCE AS NEEDED
Status: DISCONTINUED | OUTPATIENT
Start: 2024-10-10 | End: 2024-10-10 | Stop reason: HOSPADM

## 2024-10-10 RX ORDER — SODIUM CHLORIDE 0.9 % (FLUSH) 0.9 %
10 SYRINGE (ML) INJECTION EVERY 12 HOURS SCHEDULED
Status: DISCONTINUED | OUTPATIENT
Start: 2024-10-10 | End: 2024-10-10 | Stop reason: HOSPADM

## 2024-10-10 RX ORDER — SODIUM CHLORIDE, SODIUM LACTATE, POTASSIUM CHLORIDE, CALCIUM CHLORIDE 600; 310; 30; 20 MG/100ML; MG/100ML; MG/100ML; MG/100ML
9 INJECTION, SOLUTION INTRAVENOUS CONTINUOUS PRN
Status: DISCONTINUED | OUTPATIENT
Start: 2024-10-10 | End: 2024-10-10 | Stop reason: HOSPADM

## 2024-10-10 RX ORDER — PROMETHAZINE HYDROCHLORIDE 12.5 MG/1
12.5 TABLET ORAL ONCE AS NEEDED
Status: DISCONTINUED | OUTPATIENT
Start: 2024-10-10 | End: 2024-10-10 | Stop reason: HOSPADM

## 2024-10-10 RX ORDER — PROMETHAZINE HYDROCHLORIDE 25 MG/1
25 SUPPOSITORY RECTAL ONCE AS NEEDED
Status: DISCONTINUED | OUTPATIENT
Start: 2024-10-10 | End: 2024-10-10 | Stop reason: HOSPADM

## 2024-10-10 RX ORDER — SODIUM CHLORIDE 9 MG/ML
40 INJECTION, SOLUTION INTRAVENOUS AS NEEDED
Status: DISCONTINUED | OUTPATIENT
Start: 2024-10-10 | End: 2024-10-10 | Stop reason: HOSPADM

## 2024-10-10 RX ORDER — IOPAMIDOL 510 MG/ML
INJECTION, SOLUTION INTRAVASCULAR AS NEEDED
Status: DISCONTINUED | OUTPATIENT
Start: 2024-10-10 | End: 2024-10-10 | Stop reason: HOSPADM

## 2024-10-10 RX ORDER — ONDANSETRON 2 MG/ML
4 INJECTION INTRAMUSCULAR; INTRAVENOUS ONCE AS NEEDED
Status: DISCONTINUED | OUTPATIENT
Start: 2024-10-10 | End: 2024-10-10 | Stop reason: HOSPADM

## 2024-10-10 RX ORDER — ACETAMINOPHEN 500 MG
1000 TABLET ORAL ONCE
Status: COMPLETED | OUTPATIENT
Start: 2024-10-10 | End: 2024-10-10

## 2024-10-10 RX ORDER — IBUPROFEN 600 MG/1
600 TABLET, FILM COATED ORAL EVERY 6 HOURS PRN
Status: DISCONTINUED | OUTPATIENT
Start: 2024-10-10 | End: 2024-10-10

## 2024-10-10 RX ORDER — ACETAMINOPHEN 325 MG/1
650 TABLET ORAL ONCE
Status: DISCONTINUED | OUTPATIENT
Start: 2024-10-10 | End: 2024-10-10 | Stop reason: HOSPADM

## 2024-10-10 RX ADMIN — LIDOCAINE HYDROCHLORIDE 100 MG: 20 INJECTION, SOLUTION INTRAVENOUS at 09:42

## 2024-10-10 RX ADMIN — DEXAMETHASONE SODIUM PHOSPHATE 4 MG: 4 INJECTION, SOLUTION INTRAMUSCULAR; INTRAVENOUS at 09:50

## 2024-10-10 RX ADMIN — SODIUM CHLORIDE 2000 MG: 9 INJECTION, SOLUTION INTRAVENOUS at 09:46

## 2024-10-10 RX ADMIN — ACETAMINOPHEN 1000 MG: 500 TABLET ORAL at 08:53

## 2024-10-10 RX ADMIN — ALBUTEROL SULFATE 2.5 MG: 2.5 SOLUTION RESPIRATORY (INHALATION) at 09:59

## 2024-10-10 RX ADMIN — PROPOFOL 60 MG: 10 INJECTION, EMULSION INTRAVENOUS at 10:00

## 2024-10-10 RX ADMIN — MIDAZOLAM HYDROCHLORIDE 0.5 MG: 1 INJECTION, SOLUTION INTRAMUSCULAR; INTRAVENOUS at 09:30

## 2024-10-10 RX ADMIN — Medication 100 MCG: at 09:55

## 2024-10-10 RX ADMIN — SODIUM CHLORIDE, POTASSIUM CHLORIDE, SODIUM LACTATE AND CALCIUM CHLORIDE 9 ML/HR: 600; 310; 30; 20 INJECTION, SOLUTION INTRAVENOUS at 08:41

## 2024-10-10 RX ADMIN — PROPOFOL 140 MG: 10 INJECTION, EMULSION INTRAVENOUS at 09:42

## 2024-10-10 RX ADMIN — Medication 100 MCG: at 10:00

## 2024-10-10 RX ADMIN — ONDANSETRON HYDROCHLORIDE 4 MG: 2 SOLUTION INTRAMUSCULAR; INTRAVENOUS at 09:50

## 2024-10-10 NOTE — ANESTHESIA PREPROCEDURE EVALUATION
Anesthesia Evaluation     Patient summary reviewed and Nursing notes reviewed   no history of anesthetic complications:   NPO Solid Status: > 8 hours  NPO Liquid Status: > 2 hours           Airway   Mallampati: II  TM distance: >3 FB  Neck ROM: full  No difficulty expected  Dental      Pulmonary - normal exam    breath sounds clear to auscultation  (+) ,sleep apnea  Cardiovascular     ECG reviewed  Rhythm: irregular  Rate: normal    (+) hypertension, past MI , CAD, dysrhythmias Atrial Fib, hyperlipidemia      Neuro/Psych- negative ROS  GI/Hepatic/Renal/Endo    (+) obesity, GERD, diabetes mellitus type 2    Musculoskeletal     Abdominal    Substance History - negative use     OB/GYN negative ob/gyn ROS         Other   arthritis,   history of cancer    ROS/Med Hx Other: >4METS, HX MI 2010, AFIB, DM, CD, TOMI. ECHO 2/20 EF 55%, MILD MR, STRESS 2/20 EF 48%, NO ISCHMEIA. CARD OV 12/16/22 F/U 6[facility]TH[facility]. PREV. CARDS CLEARANCE 9/22 FOR CYSTO,TURBT.  KT        ABNORMAL ECG - 4/27//22  Atrial fibrillation  Inferior infarct, old  Anterior infarct, old  When compared with ECG of 30-Apr-2021 20:58:44,         Anesthesia Plan    ASA 4     general     (Patient understands anesthesia not responsible for dental damage.)  intravenous induction     Anesthetic plan, risks, benefits, and alternatives have been provided, discussed and informed consent has been obtained with: patient.      CODE STATUS:

## 2024-10-10 NOTE — ANESTHESIA POSTPROCEDURE EVALUATION
Patient: Toney Neri    Procedure Summary       Date: 10/10/24 Room / Location: [facility] OR  / [facility] MAIN OR    Anesthesia Start: 0936 Anesthesia Stop: 1029    Procedure: CYSTOSCOPY RETROGRADE PYELOGRAM (Bilateral) Diagnosis:       Malignant neoplasm of overlapping sites of bladder      (Malignant neoplasm of overlapping sites of bladder [C67.8])    Surgeons: Yoli Le MD Provider: Martinez Araujo MD    Anesthesia Type: general ASA Status: 4            Anesthesia Type: general    Vitals  Vitals Value Taken Time   /74 10/10/24 1059   Temp 36.4 °C (97.6 °F) 10/10/24 1050   Pulse 80 10/10/24 1102   Resp 13 10/10/24 1050   SpO2 95 % 10/10/24 1102   Vitals shown include unfiled device data.      Post Anesthesia Care and Evaluation    Patient location during evaluation: bedside  Patient participation: complete - patient participated  Level of consciousness: awake    Airway patency: patent  PONV Status: none  Cardiovascular status: acceptable  Respiratory status: acceptable  Hydration status: acceptable

## 2024-10-10 NOTE — OP NOTE
CYSTOSCOPY RETROGRADE PYELOGRAM  Procedure Report    Patient Name:  Toney Neri  YOB: 1935    Date of Surgery:  10/10/2024     Pre-op Diagnosis:   Malignant neoplasm of overlapping sites of bladder [C67.8]       Post-Op Diagnosis Codes:     * Malignant neoplasm of overlapping sites of bladder [C67.8]      Procedure/CPT® Codes:    Procedure(s):  CYSTOSCOPY BILATERAL RETROGRADE PYELOGRAM  TRANSURETHRAL RESECTION OF BLADDER TUMOR      Staff:  Surgeon(s):  Yoli Le MD         Anesthesia: Choice    Estimated Blood Loss: none    Implants:    Nothing was implanted during the procedure    Specimen:          Specimens       ID Source Type Tests Collected By Collected At Frozen?    A Urinary Bladder Tissue TISSUE PATHOLOGY EXAM   Yoli Le MD 10/10/24 1014 No    Description: BLADDER TUMOR                Complications: None    Description of Procedure:     After proper consent was obtained, patient was taken to operating room and general anesthesia was performed.  The patient was placed in the dorsal lithotomy position and prepped and draped in the normal sterile fashion for cystoscopy.      A 22 Wolof rigid cystoscope was inserted into the bladder.  The bladder was inspected in a systemic meridian fashion using a 30 degree lens.  There was a papillary bladder tumor on the junction of the left lateral lobe of the prostate and the bladder neck.  The tumor was papillary in nature.  A gyrus resectoscope was inserted into the bladder with a medium loop.  The loop was then used to resect the tumor in its entirety.  There was no perforation of the bladder noted.  The area of resection was then fulgurated to stop any bleeding.  Good muscle was sent with the specimen.  The entire area of resection was approximately 3 cm in size.  Again, no peroration of the bladder was noted.      Bilateral retrograde pyelograms and then performed.  There were no filling defects tumors stones or other  abnormalities noted in either ureter or renal pelvis.    The pieces of bladder tumor were then removed from the bladder and sent to pathology labeled as bladder tumor.  There was no remaining bleeding.      The patient tolerated the procedure well and was transferred to the PACU in stable condition.        Yoli Le MD     Date: 10/10/2024  Time: 10:39 EDT

## 2024-10-14 ENCOUNTER — APPOINTMENT (OUTPATIENT)
Dept: MRI IMAGING | Facility: HOSPITAL | Age: 89
End: 2024-10-14
Payer: MEDICARE

## 2024-10-14 ENCOUNTER — APPOINTMENT (OUTPATIENT)
Dept: GENERAL RADIOLOGY | Facility: HOSPITAL | Age: 89
End: 2024-10-14
Payer: MEDICARE

## 2024-10-14 ENCOUNTER — HOSPITAL ENCOUNTER (INPATIENT)
Facility: HOSPITAL | Age: 89
LOS: 1 days | Discharge: HOME | End: 2024-10-16
Attending: EMERGENCY MEDICINE | Admitting: FAMILY MEDICINE
Payer: MEDICARE

## 2024-10-14 DIAGNOSIS — E87.6 HYPOKALEMIA: Primary | ICD-10-CM

## 2024-10-14 DIAGNOSIS — R26.2 DIFFICULTY WALKING: ICD-10-CM

## 2024-10-14 DIAGNOSIS — E83.42 HYPOMAGNESEMIA: ICD-10-CM

## 2024-10-14 PROBLEM — R53.1 GENERALIZED WEAKNESS: Status: ACTIVE | Noted: 2024-10-14

## 2024-10-14 LAB
ALBUMIN SERPL-MCNC: 3.8 G/DL (ref 3.5–5.2)
ALBUMIN/GLOB SERPL: 1.3 G/DL
ALP SERPL-CCNC: 57 U/L (ref 39–117)
ALT SERPL W P-5'-P-CCNC: 12 U/L (ref 1–41)
ANION GAP SERPL CALCULATED.3IONS-SCNC: 14.5 MMOL/L (ref 5–15)
AST SERPL-CCNC: 18 U/L (ref 1–40)
BACTERIA UR QL AUTO: ABNORMAL /HPF
BASOPHILS # BLD AUTO: 0.05 10*3/MM3 (ref 0–0.2)
BASOPHILS NFR BLD AUTO: 0.5 % (ref 0–1.5)
BILIRUB SERPL-MCNC: 0.4 MG/DL (ref 0–1.2)
BILIRUB UR QL STRIP: NEGATIVE
BUN SERPL-MCNC: 19 MG/DL (ref 8–23)
BUN/CREAT SERPL: 12.2 (ref 7–25)
CALCIUM SPEC-SCNC: 8.6 MG/DL (ref 8.6–10.5)
CHLORIDE SERPL-SCNC: 94 MMOL/L (ref 98–107)
CLARITY UR: CLEAR
CO2 SERPL-SCNC: 27.5 MMOL/L (ref 22–29)
COLOR UR: YELLOW
CREAT SERPL-MCNC: 1.56 MG/DL (ref 0.76–1.27)
DEPRECATED RDW RBC AUTO: 49.1 FL (ref 37–54)
EGFRCR SERPLBLD CKD-EPI 2021: 42.2 ML/MIN/1.73
EOSINOPHIL # BLD AUTO: 0.12 10*3/MM3 (ref 0–0.4)
EOSINOPHIL NFR BLD AUTO: 1.1 % (ref 0.3–6.2)
ERYTHROCYTE [DISTWIDTH] IN BLOOD BY AUTOMATED COUNT: 14 % (ref 12.3–15.4)
GLOBULIN UR ELPH-MCNC: 2.9 GM/DL
GLUCOSE SERPL-MCNC: 216 MG/DL (ref 65–99)
GLUCOSE UR STRIP-MCNC: NEGATIVE MG/DL
HCT VFR BLD AUTO: 36.9 % (ref 37.5–51)
HGB BLD-MCNC: 12.1 G/DL (ref 13–17.7)
HGB UR QL STRIP.AUTO: ABNORMAL
HOLD SPECIMEN: NORMAL
HOLD SPECIMEN: NORMAL
HYALINE CASTS UR QL AUTO: ABNORMAL /LPF
IMM GRANULOCYTES # BLD AUTO: 0.05 10*3/MM3 (ref 0–0.05)
IMM GRANULOCYTES NFR BLD AUTO: 0.5 % (ref 0–0.5)
KETONES UR QL STRIP: NEGATIVE
LEUKOCYTE ESTERASE UR QL STRIP.AUTO: NEGATIVE
LYMPHOCYTES # BLD AUTO: 2.31 10*3/MM3 (ref 0.7–3.1)
LYMPHOCYTES NFR BLD AUTO: 21.5 % (ref 19.6–45.3)
MAGNESIUM SERPL-MCNC: 1.3 MG/DL (ref 1.6–2.4)
MCH RBC QN AUTO: 31.8 PG (ref 26.6–33)
MCHC RBC AUTO-ENTMCNC: 32.8 G/DL (ref 31.5–35.7)
MCV RBC AUTO: 97.1 FL (ref 79–97)
MONOCYTES # BLD AUTO: 0.98 10*3/MM3 (ref 0.1–0.9)
MONOCYTES NFR BLD AUTO: 9.1 % (ref 5–12)
NEUTROPHILS NFR BLD AUTO: 67.3 % (ref 42.7–76)
NEUTROPHILS NFR BLD AUTO: 7.24 10*3/MM3 (ref 1.7–7)
NITRITE UR QL STRIP: NEGATIVE
NRBC BLD AUTO-RTO: 0 /100 WBC (ref 0–0.2)
NT-PROBNP SERPL-MCNC: 3193 PG/ML (ref 0–1800)
PH UR STRIP.AUTO: 6.5 [PH] (ref 5–8)
PLATELET # BLD AUTO: 233 10*3/MM3 (ref 140–450)
PMV BLD AUTO: 12.1 FL (ref 6–12)
POTASSIUM SERPL-SCNC: 2.9 MMOL/L (ref 3.5–5.2)
PROT SERPL-MCNC: 6.7 G/DL (ref 6–8.5)
PROT UR QL STRIP: ABNORMAL
RBC # BLD AUTO: 3.8 10*6/MM3 (ref 4.14–5.8)
RBC # UR STRIP: ABNORMAL /HPF
REF LAB TEST METHOD: ABNORMAL
SODIUM SERPL-SCNC: 136 MMOL/L (ref 136–145)
SP GR UR STRIP: 1.01 (ref 1–1.03)
SQUAMOUS #/AREA URNS HPF: ABNORMAL /HPF
TROPONIN T SERPL HS-MCNC: 27 NG/L
UROBILINOGEN UR QL STRIP: ABNORMAL
WBC # UR STRIP: ABNORMAL /HPF
WBC NRBC COR # BLD AUTO: 10.75 10*3/MM3 (ref 3.4–10.8)
WHOLE BLOOD HOLD COAG: NORMAL
WHOLE BLOOD HOLD SPECIMEN: NORMAL

## 2024-10-14 PROCEDURE — 93010 ELECTROCARDIOGRAM REPORT: CPT | Performed by: INTERNAL MEDICINE

## 2024-10-14 PROCEDURE — 80053 COMPREHEN METABOLIC PANEL: CPT

## 2024-10-14 PROCEDURE — 36415 COLL VENOUS BLD VENIPUNCTURE: CPT

## 2024-10-14 PROCEDURE — 93005 ELECTROCARDIOGRAM TRACING: CPT | Performed by: EMERGENCY MEDICINE

## 2024-10-14 PROCEDURE — 83735 ASSAY OF MAGNESIUM: CPT | Performed by: EMERGENCY MEDICINE

## 2024-10-14 PROCEDURE — 25010000002 POTASSIUM CHLORIDE 10 MEQ/100ML SOLUTION: Performed by: EMERGENCY MEDICINE

## 2024-10-14 PROCEDURE — G0378 HOSPITAL OBSERVATION PER HR: HCPCS

## 2024-10-14 PROCEDURE — 83880 ASSAY OF NATRIURETIC PEPTIDE: CPT

## 2024-10-14 PROCEDURE — 81001 URINALYSIS AUTO W/SCOPE: CPT | Performed by: EMERGENCY MEDICINE

## 2024-10-14 PROCEDURE — 99291 CRITICAL CARE FIRST HOUR: CPT

## 2024-10-14 PROCEDURE — 70551 MRI BRAIN STEM W/O DYE: CPT

## 2024-10-14 PROCEDURE — 85025 COMPLETE CBC W/AUTO DIFF WBC: CPT

## 2024-10-14 PROCEDURE — 84484 ASSAY OF TROPONIN QUANT: CPT

## 2024-10-14 PROCEDURE — 71045 X-RAY EXAM CHEST 1 VIEW: CPT

## 2024-10-14 PROCEDURE — 93005 ELECTROCARDIOGRAM TRACING: CPT

## 2024-10-14 RX ORDER — ALBUTEROL SULFATE 90 UG/1
2 INHALANT RESPIRATORY (INHALATION) EVERY 4 HOURS PRN
COMMUNITY

## 2024-10-14 RX ORDER — NIFEDIPINE 90 MG/1
90 TABLET, FILM COATED, EXTENDED RELEASE ORAL DAILY
COMMUNITY
Start: 2024-10-11

## 2024-10-14 RX ORDER — TERAZOSIN 10 MG/1
10 CAPSULE ORAL NIGHTLY
COMMUNITY
End: 2024-10-16 | Stop reason: HOSPADM

## 2024-10-14 RX ORDER — SODIUM CHLORIDE 0.9 % (FLUSH) 0.9 %
10 SYRINGE (ML) INJECTION AS NEEDED
Status: DISCONTINUED | OUTPATIENT
Start: 2024-10-14 | End: 2024-10-16 | Stop reason: HOSPADM

## 2024-10-14 RX ORDER — POTASSIUM CHLORIDE 7.45 MG/ML
10 INJECTION INTRAVENOUS ONCE
Status: COMPLETED | OUTPATIENT
Start: 2024-10-14 | End: 2024-10-15

## 2024-10-14 RX ORDER — ALBUTEROL SULFATE AND BUDESONIDE 90; 80 UG/1; UG/1
2 AEROSOL, METERED RESPIRATORY (INHALATION) AS NEEDED
COMMUNITY

## 2024-10-14 RX ORDER — MAGNESIUM SULFATE HEPTAHYDRATE 40 MG/ML
2 INJECTION, SOLUTION INTRAVENOUS ONCE
Status: COMPLETED | OUTPATIENT
Start: 2024-10-14 | End: 2024-10-15

## 2024-10-14 RX ORDER — FLUTICASONE FUROATE, UMECLIDINIUM BROMIDE AND VILANTEROL TRIFENATATE 200; 62.5; 25 UG/1; UG/1; UG/1
1 POWDER RESPIRATORY (INHALATION)
COMMUNITY
Start: 2024-10-01

## 2024-10-14 RX ADMIN — POTASSIUM CHLORIDE 10 MEQ: 7.46 INJECTION, SOLUTION INTRAVENOUS at 23:14

## 2024-10-14 NOTE — PROGRESS NOTES
I called the patient and went over the results of his bladder biopsy which were negative.  He did not have any bladder cancer seen.  He will need a cystoscopy in the office in 6 months.  Please call him to schedule.  Thanks.

## 2024-10-15 ENCOUNTER — APPOINTMENT (OUTPATIENT)
Dept: CARDIOLOGY | Facility: HOSPITAL | Age: 89
End: 2024-10-15
Payer: MEDICARE

## 2024-10-15 PROBLEM — E87.6 HYPOKALEMIA: Status: ACTIVE | Noted: 2024-10-15

## 2024-10-15 PROBLEM — R26.81 UNSTEADY GAIT: Status: ACTIVE | Noted: 2024-10-15

## 2024-10-15 PROBLEM — R42 DIZZINESS: Status: ACTIVE | Noted: 2024-10-15

## 2024-10-15 PROBLEM — E83.42 HYPOMAGNESEMIA: Status: ACTIVE | Noted: 2024-10-15

## 2024-10-15 LAB
ANION GAP SERPL CALCULATED.3IONS-SCNC: 11.4 MMOL/L (ref 5–15)
AORTIC DIMENSIONLESS INDEX: 0.56 (DI)
ASCENDING AORTA: 3.2 CM
BH CV ECHO MEAS - AO MEAN PG: 5 MMHG
BH CV ECHO MEAS - AO ROOT DIAM: 3.1 CM
BH CV ECHO MEAS - AO V2 VTI: 30 CM
BH CV ECHO MEAS - AVA(I,D): 2.12 CM2
BH CV ECHO MEAS - EDV(CUBED): 177.5 ML
BH CV ECHO MEAS - EDV(MOD-SP2): 127 ML
BH CV ECHO MEAS - EDV(MOD-SP4): 129 ML
BH CV ECHO MEAS - EF(MOD-BP): 49.6 %
BH CV ECHO MEAS - EF(MOD-SP2): 44.1 %
BH CV ECHO MEAS - EF(MOD-SP4): 51.1 %
BH CV ECHO MEAS - ESV(CUBED): 41.1 ML
BH CV ECHO MEAS - ESV(MOD-SP2): 71 ML
BH CV ECHO MEAS - ESV(MOD-SP4): 63.1 ML
BH CV ECHO MEAS - FS: 38.6 %
BH CV ECHO MEAS - IVS/LVPW: 1.14 CM
BH CV ECHO MEAS - IVSD: 1.19 CM
BH CV ECHO MEAS - LA DIMENSION: 5.7 CM
BH CV ECHO MEAS - LV MASS(C)D: 255.4 GRAMS
BH CV ECHO MEAS - LV MAX PG: 2.6 MMHG
BH CV ECHO MEAS - LV MEAN PG: 1 MMHG
BH CV ECHO MEAS - LV V1 MAX: 80.6 CM/SEC
BH CV ECHO MEAS - LV V1 VTI: 16.7 CM
BH CV ECHO MEAS - LVIDD: 5.6 CM
BH CV ECHO MEAS - LVIDS: 3.5 CM
BH CV ECHO MEAS - LVOT AREA: 3.8 CM2
BH CV ECHO MEAS - LVOT DIAM: 2.2 CM
BH CV ECHO MEAS - LVPWD: 1.04 CM
BH CV ECHO MEAS - MV A MAX VEL: 46.3 CM/SEC
BH CV ECHO MEAS - MV DEC TIME: 0.12 SEC
BH CV ECHO MEAS - MV E MAX VEL: 125 CM/SEC
BH CV ECHO MEAS - MV E/A: 2.7
BH CV ECHO MEAS - PA V2 MAX: 86.9 CM/SEC
BH CV ECHO MEAS - RVDD: 3.5 CM
BH CV ECHO MEAS - SV(LVOT): 63.5 ML
BH CV ECHO MEAS - SV(MOD-SP2): 56 ML
BH CV ECHO MEAS - SV(MOD-SP4): 65.9 ML
BH CV ECHO MEAS - TAPSE (>1.6): 1.78 CM
BH CV ECHO MEAS - TR MAX PG: 41 MMHG
BH CV ECHO MEAS - TR MAX VEL: 320 CM/SEC
BH CV XLRA - TDI S': 12.8 CM/SEC
BUN SERPL-MCNC: 16 MG/DL (ref 8–23)
BUN/CREAT SERPL: 13.1 (ref 7–25)
CALCIUM SPEC-SCNC: 9.2 MG/DL (ref 8.6–10.5)
CHLORIDE SERPL-SCNC: 100 MMOL/L (ref 98–107)
CO2 SERPL-SCNC: 29.6 MMOL/L (ref 22–29)
CREAT SERPL-MCNC: 1.22 MG/DL (ref 0.76–1.27)
DEPRECATED RDW RBC AUTO: 49.1 FL (ref 37–54)
EGFRCR SERPLBLD CKD-EPI 2021: 56.7 ML/MIN/1.73
ERYTHROCYTE [DISTWIDTH] IN BLOOD BY AUTOMATED COUNT: 13.7 % (ref 12.3–15.4)
GLUCOSE BLDC GLUCOMTR-MCNC: 146 MG/DL (ref 70–99)
GLUCOSE BLDC GLUCOMTR-MCNC: 147 MG/DL (ref 70–99)
GLUCOSE BLDC GLUCOMTR-MCNC: 159 MG/DL (ref 70–99)
GLUCOSE BLDC GLUCOMTR-MCNC: 192 MG/DL (ref 70–99)
GLUCOSE BLDC GLUCOMTR-MCNC: 254 MG/DL (ref 70–99)
GLUCOSE SERPL-MCNC: 166 MG/DL (ref 65–99)
HCT VFR BLD AUTO: 38.5 % (ref 37.5–51)
HGB BLD-MCNC: 12.9 G/DL (ref 13–17.7)
IVRT: 68 MS
LEFT ATRIUM VOLUME INDEX: 46.3 ML/M2
MAGNESIUM SERPL-MCNC: 1.7 MG/DL (ref 1.6–2.4)
MCH RBC QN AUTO: 32.4 PG (ref 26.6–33)
MCHC RBC AUTO-ENTMCNC: 33.5 G/DL (ref 31.5–35.7)
MCV RBC AUTO: 96.7 FL (ref 79–97)
PLATELET # BLD AUTO: 211 10*3/MM3 (ref 140–450)
PMV BLD AUTO: 11.3 FL (ref 6–12)
POTASSIUM SERPL-SCNC: 3.1 MMOL/L (ref 3.5–5.2)
QT INTERVAL: 429 MS
QTC INTERVAL: 473 MS
RBC # BLD AUTO: 3.98 10*6/MM3 (ref 4.14–5.8)
SODIUM SERPL-SCNC: 141 MMOL/L (ref 136–145)
WBC NRBC COR # BLD AUTO: 11.24 10*3/MM3 (ref 3.4–10.8)

## 2024-10-15 PROCEDURE — 25810000003 SODIUM CHLORIDE 0.9 % SOLUTION: Performed by: FAMILY MEDICINE

## 2024-10-15 PROCEDURE — 93306 TTE W/DOPPLER COMPLETE: CPT | Performed by: SPECIALIST

## 2024-10-15 PROCEDURE — 85027 COMPLETE CBC AUTOMATED: CPT | Performed by: FAMILY MEDICINE

## 2024-10-15 PROCEDURE — 99223 1ST HOSP IP/OBS HIGH 75: CPT | Performed by: FAMILY MEDICINE

## 2024-10-15 PROCEDURE — 63710000001 INSULIN LISPRO (HUMAN) PER 5 UNITS: Performed by: FAMILY MEDICINE

## 2024-10-15 PROCEDURE — 82948 REAGENT STRIP/BLOOD GLUCOSE: CPT

## 2024-10-15 PROCEDURE — 83735 ASSAY OF MAGNESIUM: CPT | Performed by: INTERNAL MEDICINE

## 2024-10-15 PROCEDURE — 94799 UNLISTED PULMONARY SVC/PX: CPT

## 2024-10-15 PROCEDURE — 80048 BASIC METABOLIC PNL TOTAL CA: CPT | Performed by: FAMILY MEDICINE

## 2024-10-15 PROCEDURE — 93306 TTE W/DOPPLER COMPLETE: CPT

## 2024-10-15 PROCEDURE — 97161 PT EVAL LOW COMPLEX 20 MIN: CPT

## 2024-10-15 PROCEDURE — 97165 OT EVAL LOW COMPLEX 30 MIN: CPT

## 2024-10-15 PROCEDURE — 25010000002 MAGNESIUM SULFATE 2 GM/50ML SOLUTION: Performed by: EMERGENCY MEDICINE

## 2024-10-15 PROCEDURE — 94640 AIRWAY INHALATION TREATMENT: CPT

## 2024-10-15 PROCEDURE — 25510000001 PERFLUTREN 6.52 MG/ML SUSPENSION 2 ML VIAL: Performed by: INTERNAL MEDICINE

## 2024-10-15 RX ORDER — POTASSIUM CHLORIDE 750 MG/1
40 CAPSULE, EXTENDED RELEASE ORAL ONCE
Status: COMPLETED | OUTPATIENT
Start: 2024-10-15 | End: 2024-10-15

## 2024-10-15 RX ORDER — POLYETHYLENE GLYCOL 3350 17 G/17G
17 POWDER, FOR SOLUTION ORAL DAILY PRN
Status: DISCONTINUED | OUTPATIENT
Start: 2024-10-15 | End: 2024-10-16 | Stop reason: HOSPADM

## 2024-10-15 RX ORDER — AMOXICILLIN 250 MG
2 CAPSULE ORAL 2 TIMES DAILY PRN
Status: DISCONTINUED | OUTPATIENT
Start: 2024-10-15 | End: 2024-10-16 | Stop reason: HOSPADM

## 2024-10-15 RX ORDER — NICOTINE POLACRILEX 4 MG
15 LOZENGE BUCCAL
Status: DISCONTINUED | OUTPATIENT
Start: 2024-10-15 | End: 2024-10-16 | Stop reason: HOSPADM

## 2024-10-15 RX ORDER — SODIUM CHLORIDE 9 MG/ML
100 INJECTION, SOLUTION INTRAVENOUS CONTINUOUS
Status: ACTIVE | OUTPATIENT
Start: 2024-10-15 | End: 2024-10-15

## 2024-10-15 RX ORDER — ONDANSETRON 2 MG/ML
4 INJECTION INTRAMUSCULAR; INTRAVENOUS EVERY 6 HOURS PRN
Status: DISCONTINUED | OUTPATIENT
Start: 2024-10-15 | End: 2024-10-16 | Stop reason: HOSPADM

## 2024-10-15 RX ORDER — BUDESONIDE AND FORMOTEROL FUMARATE DIHYDRATE 160; 4.5 UG/1; UG/1
2 AEROSOL RESPIRATORY (INHALATION)
Status: DISCONTINUED | OUTPATIENT
Start: 2024-10-15 | End: 2024-10-16 | Stop reason: HOSPADM

## 2024-10-15 RX ORDER — MECLIZINE HYDROCHLORIDE 25 MG/1
25 TABLET ORAL 3 TIMES DAILY PRN
Status: DISCONTINUED | OUTPATIENT
Start: 2024-10-15 | End: 2024-10-16 | Stop reason: HOSPADM

## 2024-10-15 RX ORDER — BISACODYL 10 MG
10 SUPPOSITORY, RECTAL RECTAL DAILY PRN
Status: DISCONTINUED | OUTPATIENT
Start: 2024-10-15 | End: 2024-10-16 | Stop reason: HOSPADM

## 2024-10-15 RX ORDER — ASPIRIN 81 MG/1
81 TABLET ORAL DAILY
Status: DISCONTINUED | OUTPATIENT
Start: 2024-10-15 | End: 2024-10-16 | Stop reason: HOSPADM

## 2024-10-15 RX ORDER — INSULIN LISPRO 100 [IU]/ML
2-9 INJECTION, SOLUTION INTRAVENOUS; SUBCUTANEOUS
Status: DISCONTINUED | OUTPATIENT
Start: 2024-10-15 | End: 2024-10-16 | Stop reason: HOSPADM

## 2024-10-15 RX ORDER — ATORVASTATIN CALCIUM 20 MG/1
20 TABLET, FILM COATED ORAL NIGHTLY
Status: DISCONTINUED | OUTPATIENT
Start: 2024-10-15 | End: 2024-10-16 | Stop reason: HOSPADM

## 2024-10-15 RX ORDER — SODIUM CHLORIDE 0.9 % (FLUSH) 0.9 %
10 SYRINGE (ML) INJECTION EVERY 12 HOURS SCHEDULED
Status: DISCONTINUED | OUTPATIENT
Start: 2024-10-15 | End: 2024-10-16 | Stop reason: HOSPADM

## 2024-10-15 RX ORDER — SODIUM CHLORIDE 0.9 % (FLUSH) 0.9 %
10 SYRINGE (ML) INJECTION AS NEEDED
Status: DISCONTINUED | OUTPATIENT
Start: 2024-10-15 | End: 2024-10-16 | Stop reason: HOSPADM

## 2024-10-15 RX ORDER — IBUPROFEN 600 MG/1
1 TABLET ORAL
Status: DISCONTINUED | OUTPATIENT
Start: 2024-10-15 | End: 2024-10-16 | Stop reason: HOSPADM

## 2024-10-15 RX ORDER — BISACODYL 5 MG/1
5 TABLET, DELAYED RELEASE ORAL DAILY PRN
Status: DISCONTINUED | OUTPATIENT
Start: 2024-10-15 | End: 2024-10-16 | Stop reason: HOSPADM

## 2024-10-15 RX ORDER — DEXTROSE MONOHYDRATE 25 G/50ML
25 INJECTION, SOLUTION INTRAVENOUS
Status: DISCONTINUED | OUTPATIENT
Start: 2024-10-15 | End: 2024-10-16 | Stop reason: HOSPADM

## 2024-10-15 RX ORDER — TERAZOSIN 5 MG/1
10 CAPSULE ORAL NIGHTLY
Status: DISCONTINUED | OUTPATIENT
Start: 2024-10-15 | End: 2024-10-16

## 2024-10-15 RX ORDER — IPRATROPIUM BROMIDE AND ALBUTEROL SULFATE 2.5; .5 MG/3ML; MG/3ML
3 SOLUTION RESPIRATORY (INHALATION) EVERY 4 HOURS PRN
Status: DISCONTINUED | OUTPATIENT
Start: 2024-10-15 | End: 2024-10-16 | Stop reason: HOSPADM

## 2024-10-15 RX ADMIN — ASPIRIN 81 MG: 81 TABLET, COATED ORAL at 08:07

## 2024-10-15 RX ADMIN — INSULIN LISPRO 6 UNITS: 100 INJECTION, SOLUTION INTRAVENOUS; SUBCUTANEOUS at 12:21

## 2024-10-15 RX ADMIN — POTASSIUM CHLORIDE 40 MEQ: 750 CAPSULE, EXTENDED RELEASE ORAL at 08:07

## 2024-10-15 RX ADMIN — TERAZOSIN HYDROCHLORIDE 10 MG: 5 CAPSULE ORAL at 21:59

## 2024-10-15 RX ADMIN — BUDESONIDE AND FORMOTEROL FUMARATE DIHYDRATE 2 PUFF: 160; 4.5 AEROSOL RESPIRATORY (INHALATION) at 09:53

## 2024-10-15 RX ADMIN — ATORVASTATIN CALCIUM 20 MG: 20 TABLET, FILM COATED ORAL at 21:58

## 2024-10-15 RX ADMIN — TIOTROPIUM BROMIDE INHALATION SPRAY 2 PUFF: 3.12 SPRAY, METERED RESPIRATORY (INHALATION) at 09:53

## 2024-10-15 RX ADMIN — Medication 10 ML: at 00:50

## 2024-10-15 RX ADMIN — SODIUM CHLORIDE 100 ML/HR: 9 INJECTION, SOLUTION INTRAVENOUS at 00:49

## 2024-10-15 RX ADMIN — Medication 10 ML: at 22:01

## 2024-10-15 RX ADMIN — SODIUM CHLORIDE 4 ML: 9 INJECTION INTRAMUSCULAR; INTRAVENOUS; SUBCUTANEOUS at 13:17

## 2024-10-15 RX ADMIN — MAGNESIUM SULFATE HEPTAHYDRATE 2 G: 40 INJECTION, SOLUTION INTRAVENOUS at 00:38

## 2024-10-15 RX ADMIN — APIXABAN 5 MG: 5 TABLET, FILM COATED ORAL at 08:07

## 2024-10-15 RX ADMIN — INSULIN LISPRO 2 UNITS: 100 INJECTION, SOLUTION INTRAVENOUS; SUBCUTANEOUS at 22:01

## 2024-10-15 RX ADMIN — APIXABAN 5 MG: 5 TABLET, FILM COATED ORAL at 21:59

## 2024-10-15 NOTE — PLAN OF CARE
Problem: Adult Inpatient Plan of Care  Goal: Plan of Care Review  Outcome: Progressing   Goal Outcome Evaluation:

## 2024-10-15 NOTE — PLAN OF CARE
Problem: Adult Inpatient Plan of Care  Goal: Plan of Care Review  10/15/2024 0150 by Akiko Andrade, RN  Outcome: Progressing   Goal Outcome Evaluation:      Patient AAOx4 able to make wants and needs known, Patient resting in bed eyes closed respiration even and unlabored, no c/o pain, Blood pressure is slightly elevated. No acute events overnight, call light within reach

## 2024-10-15 NOTE — THERAPY EVALUATION
Acute Care - Physical Therapy Initial Evaluation   [place]     Patient Name: Toney Neri  : 1935  MRN: [id number]  Today's Date: 10/15/2024      Visit Dx:     ICD-10-CM ICD-9-CM   1. Hypokalemia  E87.6 276.8   2. Hypomagnesemia  E83.42 275.2   3. Difficulty walking  R26.2 719.7     Patient Active Problem List   Diagnosis    Malignant neoplasm of urinary bladder    Permanent atrial fibrillation    Hypertension, essential    Hyperlipemia, mixed    Allergic rhinitis    Arthritis    Benign prostatic hyperplasia    Type 2 diabetes mellitus    Gastroesophageal reflux disease    Sleep apnea    Umbilical hernia    Urothelial carcinoma of bladder    Generalized weakness    Hypokalemia    Hypomagnesemia    Unsteady gait    Dizziness     Past Medical History:   Diagnosis Date    Acid reflux     Arthritis     Atrial fibrillation     [place] FOLLOWS PT,    Cancer     Coronary artery disease     NO INTERVENTION    Diabetes mellitus, type 2     METFORMIN    Elevated PSA     Former smoker     INHALERS: TRELEGY, ALBUTEROL, NON SPECIFIC LUNG HX    High cholesterol     Hypertension     Limb swelling     Myocardial infarction     NO INTERVENTION, APPROX     Skin lesion     RECENT SKIN CA REMOVED FROM FACE    Sleep apnea     NO CPAP    Urinary stream slowing      Past Surgical History:   Procedure Laterality Date    ANKLE SURGERY Right     ankle repair    BICEPS TENDON REPAIR Right     COLONOSCOPY  2012    CYSTOSCOPY      CYSTOSCOPY RETROGRADE PYELOGRAM Bilateral 2022    Procedure: CYSTOSCOPY RETROGRADE PYELOGRAM;  Surgeon: Yoli Le MD;  Location: [facility];  Service: Urology;  Laterality: Bilateral;    CYSTOSCOPY RETROGRADE PYELOGRAM Bilateral 3/21/2023    Procedure: CYSTOSCOPY RETROGRADE PYELOGRAM;  Surgeon: Yoli Le MD;  Location: [facility] MAIN OR;  Service: Urology;  Laterality: Bilateral;    CYSTOSCOPY RETROGRADE PYELOGRAM Bilateral 10/10/2024    Procedure: CYSTOSCOPY RETROGRADE  PYELOGRAM;  Surgeon: Yoli Le MD;  Location: [facility] MAIN OR;  Service: Urology;  Laterality: Bilateral;    ENDOSCOPY  2018 2020    GALLBLADDER SURGERY      ROTATOR CUFF REPAIR Bilateral 1988 1990    TONSILLECTOMY      TRANSURETHRAL RESECTION OF BLADDER TUMOR N/A 08/19/2021    Procedure: CYSTOSCOPY TRANSURETHRAL RESECTION OF BLADDER TUMOR;  Surgeon: Yoli Le MD;  Location: [facility] MAIN OR;  Service: Urology;  Laterality: N/A;    TRANSURETHRAL RESECTION OF BLADDER TUMOR N/A 4/26/2022    Procedure: CYSTOSCOPY TRANSURETHRAL RESECTION OF BLADDER TUMOR;  Surgeon: Yoli Le MD;  Location: [facility] MAIN OR;  Service: Urology;  Laterality: N/A;    TRANSURETHRAL RESECTION OF BLADDER TUMOR N/A 11/10/2022    Procedure: CYSTOSCOPY TRANSURETHRAL RESECTION OF BLADDER TUMOR;  Surgeon: Yoli Le MD;  Location: [facility] MAIN OR;  Service: Urology;  Laterality: N/A;    TRANSURETHRAL RESECTION OF BLADDER TUMOR N/A 3/21/2023    Procedure: CYSTOSCOPY TRANSURETHRAL RESECTION OF BLADDER TUMOR;  Surgeon: Yoli Le MD;  Location: [facility] OR;  Service: Urology;  Laterality: N/A;     PT Assessment (Last 12 Hours)       PT Evaluation and Treatment       Row Name 10/15/24 0900          Physical Therapy Time and Intention    Subjective Information no complaints  -CS     Document Type evaluation  -CS     Mode of Treatment individual therapy;physical therapy  -CS     Patient Effort good  -CS     Symptoms Noted During/After Treatment none  -CS       Row Name 10/15/24 0900          General Information    Patient Profile Reviewed yes  -CS     Patient Observations alert;cooperative;agree to therapy  -CS     Prior Level of Function independent:;all household mobility;gait;transfer;bed mobility;ADL's  Patient reports friends or family drive him to the grocery store or to appointments  -CS     Equipment Currently Used at Home none  Patient has a walk-in shower.  No assistive devices for ambulation.  No home  "oxygen  -CS     Existing Precautions/Restrictions no known precautions/restrictions  -CS     Limitations/Impairments visual  pt is legally blind  -CS     Barriers to Rehab none identified  -CS       Row Name 10/15/24 0900          Living Environment    Current Living Arrangements home  -CS     Home Accessibility stairs to enter home;stairs within home  -CS     People in Home alone  -CS     Primary Care Provided by self  -CS       Row Name 10/15/24 0900          Home Main Entrance    Number of Stairs, Main Entrance --  pt enters through the basement and then has 13 steps to the main level; front porch he states \"has a few steps\"  -CS     Stair Railings, Main Entrance railings on both sides of stairs  -CS       Row Name 10/15/24 0900          Stairs Within Home, Primary    Stairs, Within Home, Primary Patient has 13 steps to the basement and 13 steps to the second level of the home from the main level. Patient reports the bedroom is on the second level.  -CS     Stair Railings, Within Home, Primary railings on both sides of stairs  -CS       Row Name 10/15/24 0900          Pain    Pretreatment Pain Rating 0/10 - no pain  -CS     Posttreatment Pain Rating 0/10 - no pain  -CS       Row Name 10/15/24 0900          Cognition    Orientation Status (Cognition) oriented x 3  -       Row Name 10/15/24 0900          Range of Motion Comprehensive    General Range of Motion bilateral lower extremity ROM WFL  -CS       Row Name 10/15/24 0900          Strength Comprehensive (MMT)    General Manual Muscle Testing (MMT) Assessment no strength deficits identified  -CS       Row Name 10/15/24 0900          Bed Mobility    Bed Mobility bed mobility (all) activities  -CS     All Activities, [place] (Bed Mobility) independent  -CS       Row Name 10/15/24 0900          Transfers    Transfers sit-stand transfer;stand-sit transfer  -       Row Name 10/15/24 0900          Sit-Stand Transfer    Sit-Stand [place] (Transfers) " modified independence  -     Assistive Device (Sit-Stand Transfers) --  no AD, bed rail used  -CS       Row Name 10/15/24 0900          Stand-Sit Transfer    Stand-Sit [place] (Transfers) modified independence  -CS     Assistive Device (Stand-Sit Transfers) other (see comments)  no AD, bed rail used  -       Row Name 10/15/24 0900          Gait/Stairs (Locomotion)    Comment, (Gait/Stairs) Pt is able to ambulate with supervision and cues due to being legally blind and in an unfamiliar environment. He is able to hold onto his IV and push it while ambulating in the room and to/from the bathroom.  -       Row Name 10/15/24 0900          Safety Issues/Impairments Affecting Functional Mobility    Impairments Affecting Function (Mobility) endurance/activity tolerance  -       Row Name 10/15/24 0900          Balance    Balance Assessment standing dynamic balance  -CS     Dynamic Standing Balance supervision;verbal cues  -CS     Position/Device Used, Standing Balance supported  IV pole  -       Row Name 10/15/24 0900          Plan of Care Review    Plan of Care Reviewed With patient  -CS     Progress no change  -CS     Outcome Evaluation Pt presents with no physical limitations that impede his ability to return home independently or with assist from family as needed. Pt was encouraged to continue ambulating as tolerated while here at the hospital with staff present in the room for safety. He will be discharged from PT caseload.  Recommend follow-up of home health physical therapy to ensure safety in the home environment once medically discharged.  -       Row Name 10/15/24 0900          Positioning and Restraints    Pre-Treatment Position in bed  -CS     Post Treatment Position bed  -CS     In Bed supine;call light within reach;encouraged to call for assist;exit alarm on  -CS       Row Name 10/15/24 0900          Therapy Assessment/Plan (PT)    Rehab Potential (PT) good  -CS     Criteria for Skilled  Interventions Met (PT) no problems identified which require skilled intervention  -CS     Therapy Frequency (PT) evaluation only  -CS       Row Name 10/15/24 0900          PT Evaluation Complexity    History, PT Evaluation Complexity no personal factors and/or comorbidities  -CS     Examination of Body Systems (PT Eval Complexity) total of 4 or more elements  -CS     Clinical Presentation (PT Evaluation Complexity) stable  -CS     Clinical Decision Making (PT Evaluation Complexity) low complexity  -CS     Overall Complexity (PT Evaluation Complexity) low complexity  -CS       Row Name 10/15/24 0900          Therapy Plan Review/Discharge Plan (PT)    Therapy Plan Review (PT) evaluation/treatment results reviewed;patient  -CS       Row Name 10/15/24 0900          Physical Therapy Goals    Problem Specific Goal Selection (PT) problem specific goal 1, PT  -CS       Row Name 10/15/24 0900          Problem Specific Goal 1 (PT)    Problem Specific Goal 1 (PT) Complete physical therapy evaluation.  -CS     Time Frame (Problem Specific Goal 1, PT) by discharge  -CS     Progress/Outcome (Problem Specific Goal 1, PT) goal met  -CS               User Key  (r) = Recorded By, (t) = Taken By, (c) = Cosigned By      Initials Name Provider Type    CS Xiomy Wu, PT Physical Therapist                    Physical Therapy Education        No education to display                  PT Recommendation and Plan  Anticipated Discharge Disposition (PT): home with home health, home with assist  Therapy Frequency (PT): evaluation only  Plan of Care Reviewed With: patient  Progress: no change  Outcome Evaluation: Pt presents with no physical limitations that impede his ability to return home independently or with assist from family as needed. Pt was encouraged to continue ambulating as tolerated while here at the hospital with staff present in the room for safety. He will be discharged from PT caseload.  Recommend follow-up of home health  physical therapy to ensure safety in the home environment once medically discharged.   Outcome Measures       Row Name 10/15/24 0900             How much help from another person do you currently need...    Turning from your back to your side while in flat bed without using bedrails? 4  -CS      Moving from lying on back to sitting on the side of a flat bed without bedrails? 4  -CS      Moving to and from a bed to a chair (including a wheelchair)? 4  -CS      Standing up from a chair using your arms (e.g., wheelchair, bedside chair)? 4  -CS      Climbing 3-5 steps with a railing? 4  -CS      To walk in hospital room? 3  -CS      AM-PAC 6 Clicks Score (PT) 23  -CS         Functional Assessment    Outcome Measure Options AM-PAC 6 Clicks Basic Mobility (PT)  -CS                User Key  (r) = Recorded By, (t) = Taken By, (c) = Cosigned By      Initials Name Provider Type    Xiomy Alejandra PT Physical Therapist                     Time Calculation:    PT Charges       Row Name 10/15/24 0953             Time Calculation    PT Received On 10/15/24  -CS         Untimed Charges    PT Eval/Re-eval Minutes 33  -CS         Total Minutes    Untimed Charges Total Minutes 33  -CS       Total Minutes 33  -CS                User Key  (r) = Recorded By, (t) = Taken By, (c) = Cosigned By      Initials Name Provider Type    Xiomy Alejandra PT Physical Therapist                  Therapy Charges for Today       Code Description Service Date Service Provider Modifiers Qty    23903229577 HC PT EVAL LOW COMPLEXITY 3 10/15/2024 Xiomy Wu PT GP 1            PT G-Codes  Outcome Measure Options: AM-PAC 6 Clicks Daily Activity (OT), Optimal Instrument  AM-PAC 6 Clicks Score (PT): 23  AM-PAC 6 Clicks Score (OT): 24    Xiomy Wu PT  10/15/2024

## 2024-10-15 NOTE — PLAN OF CARE
Goal Outcome Evaluation:  Plan of Care Reviewed With: patient        Progress: no change  Outcome Evaluation: Pt presents with no physical limitations that impede his ability to return home independently or with assist from family as needed. Pt was encouraged to continue ambulating as tolerated while here at the hospital with staff present in the room for safety. He will be discharged from PT caseload.  Recommend follow-up of home health physical therapy to ensure safety in the home environment once medically discharged.    Anticipated Discharge Disposition (PT): home with home health, home with assist                         no

## 2024-10-15 NOTE — PLAN OF CARE
Goal Outcome Evaluation:  Plan of Care Reviewed With: patient        Progress: no change  Outcome Evaluation: No deficits identified.  Patient currently at his baseline of function and able to walk to and and from the bathroom without difficulty.  No assistance needed with self-care.  Patient agreeable that no additional OT services are necessary at this    Anticipated Discharge Disposition (OT): home

## 2024-10-15 NOTE — THERAPY EVALUATION
Patient Name: Toney Neri  : 1935    MRN: [id number]                              Today's Date: 10/15/2024       Admit Date: 10/14/2024    Visit Dx:     ICD-10-CM ICD-9-CM   1. Hypokalemia  E87.6 276.8   2. Hypomagnesemia  E83.42 275.2   3. Difficulty walking  R26.2 719.7     Patient Active Problem List   Diagnosis    Malignant neoplasm of urinary bladder    Permanent atrial fibrillation    Hypertension, essential    Hyperlipemia, mixed    Allergic rhinitis    Arthritis    Benign prostatic hyperplasia    Type 2 diabetes mellitus    Gastroesophageal reflux disease    Sleep apnea    Umbilical hernia    Urothelial carcinoma of bladder    Generalized weakness    Hypokalemia    Hypomagnesemia    Unsteady gait    Dizziness     Past Medical History:   Diagnosis Date    Acid reflux     Arthritis     Atrial fibrillation     [place] FOLLOWS PT,    Cancer     Coronary artery disease     NO INTERVENTION    Diabetes mellitus, type 2     METFORMIN    Elevated PSA     Former smoker     INHALERS: TRELEGY, ALBUTEROL, NON SPECIFIC LUNG HX    High cholesterol     Hypertension     Limb swelling     Myocardial infarction     NO INTERVENTION, APPROX     Skin lesion     RECENT SKIN CA REMOVED FROM FACE    Sleep apnea     NO CPAP    Urinary stream slowing      Past Surgical History:   Procedure Laterality Date    ANKLE SURGERY Right     ankle repair    BICEPS TENDON REPAIR Right     COLONOSCOPY  2012    CYSTOSCOPY      CYSTOSCOPY RETROGRADE PYELOGRAM Bilateral 2022    Procedure: CYSTOSCOPY RETROGRADE PYELOGRAM;  Surgeon: Yoli Le MD;  Location: [facility];  Service: Urology;  Laterality: Bilateral;    CYSTOSCOPY RETROGRADE PYELOGRAM Bilateral 3/21/2023    Procedure: CYSTOSCOPY RETROGRADE PYELOGRAM;  Surgeon: Yoli Le MD;  Location: [facility] OR;  Service: Urology;  Laterality: Bilateral;    CYSTOSCOPY RETROGRADE PYELOGRAM Bilateral 10/10/2024    Procedure: CYSTOSCOPY RETROGRADE  PYELOGRAM;  Surgeon: Yoli Le MD;  Location: [facility] MAIN OR;  Service: Urology;  Laterality: Bilateral;    ENDOSCOPY  2018 2020    GALLBLADDER SURGERY      ROTATOR CUFF REPAIR Bilateral 1988 1990    TONSILLECTOMY      TRANSURETHRAL RESECTION OF BLADDER TUMOR N/A 08/19/2021    Procedure: CYSTOSCOPY TRANSURETHRAL RESECTION OF BLADDER TUMOR;  Surgeon: Yoli Le MD;  Location: [facility] MAIN OR;  Service: Urology;  Laterality: N/A;    TRANSURETHRAL RESECTION OF BLADDER TUMOR N/A 4/26/2022    Procedure: CYSTOSCOPY TRANSURETHRAL RESECTION OF BLADDER TUMOR;  Surgeon: Yoli Le MD;  Location: [facility] MAIN OR;  Service: Urology;  Laterality: N/A;    TRANSURETHRAL RESECTION OF BLADDER TUMOR N/A 11/10/2022    Procedure: CYSTOSCOPY TRANSURETHRAL RESECTION OF BLADDER TUMOR;  Surgeon: Yoli Le MD;  Location: [facility] MAIN OR;  Service: Urology;  Laterality: N/A;    TRANSURETHRAL RESECTION OF BLADDER TUMOR N/A 3/21/2023    Procedure: CYSTOSCOPY TRANSURETHRAL RESECTION OF BLADDER TUMOR;  Surgeon: Yoli Le MD;  Location: [facility] MA[facility] OR;  Service: Urology;  Laterality: N/A;      General Information       Row Name 10/15/24 0955          OT Time and Intention    Document Type evaluation  -PG     Mode of Treatment individual therapy;occupational therapy  -PG     Total Minutes, Occupational Therapy --  Lives alone.  Son from [place] has been staying with him a while.  Patient reports he is independent with all self-care including cooking.  No assistive device used inside, cane in the community.  Patient is legally blind  -PG     Patient Effort good  -PG     Symptoms Noted During/After Treatment none  -PG       Row Name 10/15/24 0955          General Information    Prior Level of Function independent:;transfer;ADL's  -PG     Existing Precautions/Restrictions no known precautions/restrictions  -PG     Barriers to Rehab none identified  -PG       Row Name 10/15/24 0955          Occupational Profile     Reason for Services/Referral (Occupational Profile) Patient is a pleasant 89-year-old male admitted for hypokalemia and generalized weakness.  Patient is being evaluated by Occupational Therapy due to recent incline in ADL function.  No previous OT services identified  -       Row Name 10/15/24 0955          Living Environment    People in Home alone  -       Row Name 10/15/24 0955          Cognition    Orientation Status (Cognition) oriented x 3  -PG               User Key  (r) = Recorded By, (t) = Taken By, (c) = Cosigned By      Initials Name Provider Type    PG Lele Marquez, OT Occupational Therapist                     Mobility/ADL's       Row Name 10/15/24 0957          Transfers    Transfers sit-stand transfer;stand-sit transfer;toilet transfer  -       Row Name 10/15/24 0957          Sit-Stand Transfer    Sit-Stand [place] (Transfers) supervision  -       Row Name 10/15/24 0957          Stand-Sit Transfer    Stand-Sit [place] (Transfers) supervision  -       Row Name 10/15/24 0957          Toilet Transfer    Type (Toilet Transfer) sit-stand;stand-sit  -PG     [place] Level (Toilet Transfer) supervision  -     Assistive Device (Toilet Transfer) raised toilet seat  -       Row Name 10/15/24 0957          Activities of Daily Living    BADL Assessment/Intervention bathing;upper body dressing;lower body dressing;grooming;toileting  -       Row Name 10/15/24 0957          Bathing Assessment/Intervention    [place] Level (Bathing) bathing skills;supervision  -       Row Name 10/15/24 0957          Upper Body Dressing Assessment/Training    [place] Level (Upper Body Dressing) upper body dressing skills;supervision  -       Row Name 10/15/24 0957          Lower Body Dressing Assessment/Training    [place] Level (Lower Body Dressing) lower body dressing skills;supervision  -       Row Name 10/15/24 0957          Grooming Assessment/Training    [place] Level  (Grooming) grooming skills;supervision  -PG       [facility] Name 10/15/24 0957          Toileting Assessment/Training    [place] Level (Toileting) toileting skills;supervision  -PG               User Key  (r) = Recorded By, (t) = Taken By, (c) = Cosigned By      Initials Name Provider Type    Lele Galvez OT Occupational Therapist                   Obj/Interventions       [facility] Name 10/15/24 0957          Sensory Assessment (Somatosensory)    Sensory Assessment (Somatosensory) sensation intact  -PG       [facility] Name 10/15/24 0957          Vision Assessment/Intervention    Visual Impairment/Limitations legally blind  -PG       Row Name 10/15/24 0957          Range of Motion Comprehensive    General Range of Motion no range of motion deficits identified  -       Row Name 10/15/24 0957          Strength Comprehensive (MMT)    General Manual Muscle Testing (MMT) Assessment no strength deficits identified  -PG       Row Name 10/15/24 0957          Motor Skills    Motor Skills coordination;functional endurance  -PG     Coordination WFL  -PG     Functional Endurance good minus  -PG               User Key  (r) = Recorded By, (t) = Taken By, (c) = Cosigned By      Initials Name Provider Type    PG Lele Marquez OT Occupational Therapist                   Goals/Plan    No documentation.                  Clinical Impression       Row Name 10/15/24 0958          Pain Assessment    Pretreatment Pain Rating 0/10 - no pain  -PG     Posttreatment Pain Rating 0/10 - no pain  -PG       Row Name 10/15/24 0958          Plan of Care Review    Plan of Care Reviewed With patient  -PG     Progress no change  -PG     Outcome Evaluation No deficits identified.  Patient currently at his baseline of function and able to walk to and and from the bathroom without difficulty.  No assistance needed with self-care.  Patient agreeable that no additional OT services are necessary at this  -PG       Row Name 10/15/24 0958          Therapy  Assessment/Plan (OT)    Criteria for Skilled Therapeutic Interventions Met (OT) no;no problems identified which require skilled intervention  -PG     Therapy Frequency (OT) evaluation only  -PG       Row Name 10/15/24 0958          Therapy Plan Review/Discharge Plan (OT)    Anticipated Discharge Disposition (OT) home  -PG               User Key  (r) = Recorded By, (t) = Taken By, (c) = Cosigned By      Initials Name Provider Type    PG Lele Marquez, NORI Occupational Therapist                   Outcome Measures       Row Name 10/15/24 0959          How much help from another is currently needed...    Putting on and taking off regular lower body clothing? 4  -PG     Bathing (including washing, rinsing, and drying) 4  -PG     Toileting (which includes using toilet bed pan or urinal) 4  -PG     Putting on and taking off regular upper body clothing 4  -PG     Taking care of personal grooming (such as brushing teeth) 4  -PG     Eating meals 4  -PG     AM-PAC 6 Clicks Score (OT) 24  -PG       Row Name 10/15/24 0900 10/15/24 0727       How much help from another person do you currently need...    Turning from your back to your side while in flat bed without using bedrails? 4  -CS 4  -BT    Moving from lying on back to sitting on the side of a flat bed without bedrails? 4  -CS 4  -BT    Moving to and from a bed to a chair (including a wheelchair)? 4  -CS 4  -BT    Standing up from a chair using your arms (e.g., wheelchair, bedside chair)? 4  -CS 4  -BT    Climbing 3-5 steps with a railing? 4  -CS 3  -BT    To walk in hospital room? 3  -CS 4  -BT    AM-PAC 6 Clicks Score (PT) 23  -CS 23  -BT    Highest Level of Mobility Goal 7 --> Walk 25 feet or more  -CS 7 --> Walk 25 feet or more  -BT      Row Name 10/15/24 0050          How much help from another person do you currently need...    Turning from your back to your side while in flat bed without using bedrails? 4  -AG     Moving from lying on back to sitting on the side of a  flat bed without bedrails? 4  -AG     Moving to and from a bed to a chair (including a wheelchair)? 4  -AG     Standing up from a chair using your arms (e.g., wheelchair, bedside chair)? 4  -AG     Climbing 3-5 steps with a railing? 4  -AG     To walk in hospital room? 4  -AG     AM-PAC 6 Clicks Score (PT) 24  -AG     Highest Level of Mobility Goal 8 --> Walked 250 feet or more  -AG       Row Name 10/15/24 0959 10/15/24 0900       Functional Assessment    Outcome Measure Options AM-PAC 6 Clicks Daily Activity (OT);Optimal Instrument  -PG AM-PAC 6 Clicks Basic Mobility (PT)  -CS      Row Name 10/15/24 0959          Optimal Instrument    Optimal Instrument Optimal - 3  -PG     Bending/Stooping 1  -PG     Standing 1  -PG     Reaching 1  -PG     From the list, choose the 3 activities you would most like to be able to do without any difficulty Bending/stooping;Standing;Reaching  -PG     Total Score Optimal - 3 3  -PG               User Key  (r) = Recorded By, (t) = Taken By, (c) = Cosigned By      Initials Name Provider Type    PG Lele Marquez, OT Occupational Therapist    CS Xiomy Wu, PT Physical Therapist    AG Akiko Andrade, RN Registered Nurse    BT Mae Bartholomew RN Registered Nurse                    Occupational Therapy Education        No education to display                  OT Recommendation and Plan  Therapy Frequency (OT): evaluation only  Plan of Care Review  Plan of Care Reviewed With: patient  Progress: no change  Outcome Evaluation: No deficits identified.  Patient currently at his baseline of function and able to walk to and and from the bathroom without difficulty.  No assistance needed with self-care.  Patient agreeable that no additional OT services are necessary at this     Time Calculation:   Evaluation Complexity (OT)  Review Occupational Profile/Medical/Therapy History Complexity: brief/low complexity  Assessment, Occupational Performance/Identification of Deficit Complexity: 3-5  performance deficits  Clinical Decision Making Complexity (OT): problem focused assessment/low complexity  Overall Complexity of Evaluation (OT): low complexity     Time Calculation- OT       Row Name 10/15/24 1000             Time Calculation- OT    OT Received On 10/15/24  -PG         Untimed Charges    OT Eval/Re-eval Minutes 35  -PG         Total Minutes    Untimed Charges Total Minutes 35  -PG       Total Minutes 35  -PG                User Key  (r) = Recorded By, (t) = Taken By, (c) = Cosigned By      Initials Name Provider Type    PG Lele Marquez OT Occupational Therapist                  Therapy Charges for Today       Code Description Service Date Service Provider Modifiers Qty    [id number]  OT EVAL LOW COMPLEXITY 3 10/15/2024 Lele Marquez OT GO 1                 Lele Marquez OT  10/15/2024

## 2024-10-15 NOTE — ED PROVIDER NOTES
Time: 10:07 PM EDT  Date of encounter:  10/14/2024  Independent Historian/Clinical History and Information was obtained by:   Patient, friend    History is limited by: N/A    Chief Complaint: Dizziness      History of Present Illness:  Patient is a 89 y.o. year old male who presents to the emergency department for evaluation of dizziness, weakness is gotten worse over the past couple days.  Patient had a recent cystoscopy and resection of a bladder tumor.  Patient has no fever or chills.  Patient denies chest pain and shortness of breath.  Patient has no cough or hemoptysis.  Patient denies nausea and vomiting but does report some diarrhea.  Patient denies abdominal pain.      Patient Care Team  Primary Care Provider: Una Neves APRN    Past Medical History:     No Known Allergies  Past Medical History:   Diagnosis Date    Acid reflux     Arthritis     Atrial fibrillation     CH[place]AP[place] FOLLOWS PT,    Cancer     Coronary artery disease     NO INTERVENTION    Diabetes mellitus, type 2     METFORMIN    Elevated PSA     Former smoker     INHALERS: TRELEGY, ALBUTEROL, NON SPECIFIC LUNG HX    High cholesterol     Hypertension     Limb swelling     Myocardial infarction     NO INTERVENTION, APPROX 2010    Skin lesion     RECENT SKIN CA REMOVED FROM FACE    Sleep apnea     NO CPAP    Urinary stream slowing      Past Surgical History:   Procedure Laterality Date    ANKLE SURGERY Right 1989    ankle repair    BICEPS TENDON REPAIR Right     COLONOSCOPY  2012    CYSTOSCOPY      CYSTOSCOPY RETROGRADE PYELOGRAM Bilateral 4/26/2022    Procedure: CYSTOSCOPY RETROGRADE PYELOGRAM;  Surgeon: Yoli Le MD;  Location: [facility];  Service: Urology;  Laterality: Bilateral;    CYSTOSCOPY RETROGRADE PYELOGRAM Bilateral 3/21/2023    Procedure: CYSTOSCOPY RETROGRADE PYELOGRAM;  Surgeon: Yoli Le MD;  Location: [facility] OR;  Service: Urology;  Laterality: Bilateral;    CYSTOSCOPY RETROGRADE PYELOGRAM Bilateral  10/10/2024    Procedure: CYSTOSCOPY RETROGRADE PYELOGRAM;  Surgeon: Yoli Le MD;  Location: [facility];  Service: Urology;  Laterality: Bilateral;    ENDOSCOPY  2018 2020    GALLBLADDER SURGERY      ROTATOR CUFF REPAIR Bilateral 1988 1990    TONSILLECTOMY      TRANSURETHRAL RESECTION OF BLADDER TUMOR N/A 08/19/2021    Procedure: CYSTOSCOPY TRANSURETHRAL RESECTION OF BLADDER TUMOR;  Surgeon: Yoli Le MD;  Location: [facility] OR;  Service: Urology;  Laterality: N/A;    TRANSURETHRAL RESECTION OF BLADDER TUMOR N/A 4/26/2022    Procedure: CYSTOSCOPY TRANSURETHRAL RESECTION OF BLADDER TUMOR;  Surgeon: Yoli Le MD;  Location: [facility] OR;  Service: Urology;  Laterality: N/A;    TRANSURETHRAL RESECTION OF BLADDER TUMOR N/A 11/10/2022    Procedure: CYSTOSCOPY TRANSURETHRAL RESECTION OF BLADDER TUMOR;  Surgeon: Yoli Le MD;  Location: [facility];  Service: Urology;  Laterality: N/A;    TRANSURETHRAL RESECTION OF BLADDER TUMOR N/A 3/21/2023    Procedure: CYSTOSCOPY TRANSURETHRAL RESECTION OF BLADDER TUMOR;  Surgeon: Yoli Le MD;  Location: [facility] OR;  Service: Urology;  Laterality: N/A;     Family History   Problem Relation Age of Onset    Heart disease Mother     Arthritis Mother     Prostate cancer Father     Arthritis Father     Prostate cancer Son     Diabetes Other     Malig Hyperthermia Neg Hx        Home Medications:  Prior to Admission medications    Medication Sig Start Date End Date Taking? Authorizing Provider   NIFEdipine CC (ADALAT CC) 90 MG 24 hr tablet Take 1 tablet by mouth Daily. 10/11/24  Yes Maame Reyes MD Trelegy Ellipjojo 200-62.5-25 MCG/ACT inhaler Inhale 1 puff Daily. 10/1/24  Yes Maame Reyes MD   acetaminophen (TYLENOL) 325 MG tablet Take 1 tablet by mouth Daily. 4/21/21   Maame Reyes MD   albuterol sulfate  (90 Base) MCG/ACT inhaler Inhale 1 puff Daily As Needed. 1/30/21   Maame Reyes MD    apixaban (Eliquis) 5 MG tablet tablet Take 1 tablet by mouth 2 (two) times a day. PATIENT STATES WAS DIRECTED BY PHYSICIAN TO STOP 2 DAYS PRIOR TO SURGERY DATE. 1/30/21   Maame Reyes MD   Aspirin Low Dose 81 MG EC tablet Take 1 tablet by mouth Daily. NO STOP NEEDED ON ASA PER DR. HENDRICKS 4/21/21   Maame Reyes MD   atenolol (TENORMIN) 50 MG tablet Take 1 tablet by mouth Every Morning.    Maame Reyes MD   carboxymethylcellulose (REFRESH PLUS) 0.5 % solution 1 drop 4 (Four) Times a Day.    Maame Reyes MD   finasteride (PROSCAR) 5 MG tablet Take 1 tablet by mouth Daily. 7/26/22   Maame Reyes MD   flunisolide (NASALIDE) 25 MCG/ACT (0.025%) solution nasal spray Take or use exactly as directed.For the nose. 4/18/23 10/10/24  Maame Reyes MD   fluticasone (FLONASE) 50 MCG/ACT nasal spray 1 spray by Each Nare route 2 (Two) Times a Day. 1/30/21   Maame Reyes MD   hydroCHLOROthiazide (HYDRODIURIL) 25 MG tablet Take 0.5 tablets by mouth Every Morning.  Patient taking differently: Take 1 tablet by mouth Every Morning. 12/16/22   Jacobo Sweet MD   loratadine (CLARITIN) 10 MG tablet Take 1 tablet by mouth Every Morning. 10/20/21   Maame Reyes MD   Magnesium Oxide 400 (240 Mg) MG tablet Take 1 tablet by mouth Daily. 4/21/21   Maame Reyes MD   meclizine (ANTIVERT) 25 MG tablet Take 1 tablet by mouth 3 (Three) Times a Day As Needed for Dizziness. 4/27/22   Barrett Cortez PA-C   Melatonin 3 MG capsule Take 1 capsule by mouth 2 (Two) Times a Day.    Maame Reyes MD   metFORMIN (GLUCOPHAGE) 1000 MG tablet Take 1 tablet by mouth 2 (Two) Times a Day. INST PER ANESTHESIA PROTOCOL 1/30/21   Maame Reyes MD   multivitamins-minerals (PRESERVISION AREDS 2) capsule capsule Take 1 capsule by mouth 2 (Two) Times a Day. 9/16/24   Maame Reyes MD   polyvinyl alcohol (LIQUIFILM) 1.4 % ophthalmic solution Apply 1 drop to  eye(s) as directed by provider.    Maame Reyes MD   potassium chloride 10 MEQ CR tablet Take 1 tablet by mouth Daily. 1/30/21   Maame Reyes MD   simvastatin (ZOCOR) 40 MG tablet Take 1 tablet by mouth Every Night.    Maame Reyes MD   tamsulosin (FLOMAX) 0.4 MG capsule 24 hr capsule Take 1 capsule by mouth 2 (Two) Times a Day.    Maame Reyes MD   vitamin C (ASCORBIC ACID) 250 MG tablet Take 1 tablet by mouth Daily.    Maame Reyes MD   Fluticasone-Umeclidin-Vilant (TRELEGY ELLIPTA IN) Inhale.  10/14/24  Maame Reyes MD   melatonin 3 MG tablet Take 2 tablets by mouth every night at bedtime. 1/30/21 10/14/24  Maame Reyes MD   NIFEdipine XL (PROCARDIA XL) 90 MG 24 hr tablet Take 1 tablet by mouth Every Morning.  10/14/24  Maame Reyes MD        Social History:   Social History     Tobacco Use    Smoking status: Former     Types: Cigarettes    Smokeless tobacco: Never    Tobacco comments:     smoked 21-30 years   Vaping Use    Vaping status: Never Used   Substance Use Topics    Alcohol use: Yes     Comment: rarely drinks less than 1 drink per day has been drinking for 31 or more years    Drug use: Never         Review of Systems:  Review of Systems   Constitutional:  Negative for chills and fever.   HENT:  Negative for congestion, rhinorrhea and sore throat.    Eyes:  Negative for pain and visual disturbance.   Respiratory:  Negative for apnea, cough, chest tightness and shortness of breath.    Cardiovascular:  Negative for chest pain and palpitations.   Gastrointestinal:  Negative for abdominal pain, diarrhea, nausea and vomiting.   Genitourinary:  Negative for difficulty urinating and dysuria.   Musculoskeletal:  Negative for joint swelling and myalgias.   Skin:  Negative for color change.   Neurological:  Positive for weakness. Negative for seizures and headaches.   Psychiatric/Behavioral: Negative.     All other systems reviewed and are  "negative.       Physical Exam:  /92 (Patient Position: Lying)   Pulse 80   Temp 97.5 °F (36.4 °C) (Oral)   Resp 18   Ht 177.8 cm (70\")   Wt 116 kg (255 lb 4.7 oz)   SpO2 95%   BMI 36.63 kg/m²     Physical Exam  Vitals and nursing note reviewed.   Constitutional:       General: He is not in acute distress.     Appearance: Normal appearance. He is not toxic-appearing.   HENT:      Head: Normocephalic and atraumatic.      Jaw: There is normal jaw occlusion.   Eyes:      General: Lids are normal.      Extraocular Movements: Extraocular movements intact.      Conjunctiva/sclera: Conjunctivae normal.      Pupils: Pupils are equal, round, and reactive to light.   Cardiovascular:      Rate and Rhythm: Normal rate and regular rhythm.      Pulses: Normal pulses.      Heart sounds: Normal heart sounds.   Pulmonary:      Effort: Pulmonary effort is normal. No respiratory distress.      Breath sounds: Normal breath sounds. No wheezing or rhonchi.   Abdominal:      General: Abdomen is flat.      Palpations: Abdomen is soft.      Tenderness: There is no abdominal tenderness. There is no guarding or rebound.   Musculoskeletal:         General: Normal range of motion.      Cervical back: Normal range of motion and neck supple.      Right lower leg: No edema.      Left lower leg: No edema.   Skin:     General: Skin is warm and dry.   Neurological:      Mental Status: He is alert and oriented to person, place, and time. Mental status is at baseline.   Psychiatric:         Mood and Affect: Mood normal.                  Procedures:  Procedures      Medical Decision Making:      Comorbidities that affect care:    Hypertension, diabetes    External Notes reviewed:    Previous Operation Note: Patient had cystoscopy performed 1 week ago.      The following orders were placed and all results were independently analyzed by me:  Orders Placed This Encounter   Procedures    XR Chest 1 View    MRI Brain Without Contrast    [place] " Draw    Comprehensive Metabolic Panel    BNP    Single High Sensitivity Troponin T    CBC Auto Differential    Urinalysis With Microscopic If Indicated (No Culture) - Urine, Clean Catch    Magnesium    Urinalysis, Microscopic Only - Urine, Clean Catch    NPO Diet NPO Type: Strict NPO    Undress & Gown    Continuous Pulse Oximetry    Vital Signs    Inpatient Hospitalist Consult    Oxygen Therapy- Nasal Cannula; Titrate 1-6 LPM Per SpO2; 90 - 95%    ECG 12 Lead ED Triage Standing Order; SOA    Insert Peripheral IV    Initiate Observation Status    CBC & Differential    Green Top (Gel)    Lavender Top    Gold Top - SST    Light Blue Top       Medications Given in the Emergency Department:  Medications   sodium chloride 0.9 % flush 10 mL (has no administration in time range)   potassium chloride 10 mEq in 100 mL IVPB (has no administration in time range)   magnesium sulfate 2g/50 mL (PREMIX) infusion (has no administration in time range)        ED Course:         Labs:    Lab Results (last 24 hours)       Procedure Component Value Units Date/Time    CBC & Differential [[id number]]  (Abnormal) Collected: 10/14/24 1709    Specimen: Blood from Arm, Left Updated: 10/14/24 1715    Narrative:      The following orders were created for panel order CBC & Differential.  Procedure                               Abnormality         Status                     ---------                               -----------         ------                     CBC Auto Differential[[id number]]        Abnormal            Final result                 Please view results for these tests on the individual orders.    CBC Auto Differential [[id number]]  (Abnormal) Collected: 10/14/24 1709    Specimen: Blood from Arm, Left Updated: 10/14/24 1715     WBC 10.75 10*3/mm3      RBC 3.80 10*6/mm3      Hemoglobin 12.1 g/dL      Hematocrit 36.9 %      MCV 97.1 fL      MCH 31.8 pg      MCHC 32.8 g/dL      RDW 14.0 %      RDW-SD 49.1 fl      MPV 12.1 fL      Platelets  233 10*3/mm3      Neutrophil % 67.3 %      Lymphocyte % 21.5 %      Monocyte % 9.1 %      Eosinophil % 1.1 %      Basophil % 0.5 %      Immature Grans % 0.5 %      Neutrophils, Absolute 7.24 10*3/mm3      Lymphocytes, Absolute 2.31 10*3/mm3      Monocytes, Absolute 0.98 10*3/mm3      Eosinophils, Absolute 0.12 10*3/mm3      Basophils, Absolute 0.05 10*3/mm3      Immature Grans, Absolute 0.05 10*3/mm3      nRBC 0.0 /100 WBC     Comprehensive Metabolic Panel [[id number]]  (Abnormal) Collected: 10/14/24 1742    Specimen: Blood from Arm, Left Updated: 10/14/24 1808     Glucose 216 mg/dL      BUN 19 mg/dL      Creatinine 1.56 mg/dL      Sodium 136 mmol/L      Potassium 2.9 mmol/L      Chloride 94 mmol/L      CO2 27.5 mmol/L      Calcium 8.6 mg/dL      Total Protein 6.7 g/dL      Albumin 3.8 g/dL      ALT (SGPT) 12 U/L      AST (SGOT) 18 U/L      Alkaline Phosphatase 57 U/L      Total Bilirubin 0.4 mg/dL      Globulin 2.9 gm/dL      A/G Ratio 1.3 g/dL      BUN/Creatinine Ratio 12.2     Anion Gap 14.5 mmol/L      eGFR 42.2 mL/min/1.73     Narrative:      GFR Normal >60  Chronic Kidney Disease <60  Kidney Failure <15    The GFR formula is only valid for adults with stable renal function between ages 18 and 70.    BNP [[id number]]  (Abnormal) Collected: 10/14/24 1742    Specimen: Blood from Arm, Left Updated: 10/14/24 1806     proBNP 3,193.0 pg/mL     Narrative:      This assay is used as an aid in the diagnosis of individuals suspected of having heart failure. It can be used as an aid in the diagnosis of acute decompensated heart failure (ADHF) in patients presenting with signs and symptoms of ADHF to the emergency department (ED). In addition, NT-proBNP of <300 pg/mL indicates ADHF is not likely.    Age Range Result Interpretation  NT-proBNP Concentration (pg/mL:      <50             Positive            >450                   Gray                 300-450                    Negative             <300    50-75            Positive            >900                  Gray                300-900                  Negative            <300      >75             Positive            >1800                  Gray                300-1800                  Negative            <300    Single High Sensitivity Troponin T [[id number]]  (Abnormal) Collected: 10/14/24 1742    Specimen: Blood from Arm, Left Updated: 10/14/24 1808     HS Troponin T 27 ng/L     Narrative:      High Sensitive Troponin T Reference Range:  <14.0 ng/L- Negative Female for AMI  <22.0 ng/L- Negative Male for AMI  >=14 - Abnormal Female indicating possible myocardial injury.  >=22 - Abnormal Male indicating possible myocardial injury.   Clinicians would have to utilize clinical acumen, EKG, Troponin, and serial changes to determine if it is an Acute Myocardial Infarction or myocardial injury due to an underlying chronic condition.         Magnesium [[id number]]  (Abnormal) Collected: 10/14/24 1742    Specimen: Blood from Arm, Left Updated: 10/14/24 2024     Magnesium 1.3 mg/dL     Urinalysis With Microscopic If Indicated (No Culture) - Urine, Clean Catch [[id number]]  (Abnormal) Collected: 10/14/24 2017    Specimen: Urine, Clean Catch Updated: 10/14/24 2026     Color, UA Yellow     Appearance, UA Clear     pH, UA 6.5     Specific Gravity, UA 1.009     Glucose, UA Negative     Ketones, UA Negative     Bilirubin, UA Negative     Blood, UA Moderate (2+)     Protein, UA Trace     Leuk Esterase, UA Negative     Nitrite, UA Negative     Urobilinogen, UA 0.2 E.U./dL    Urinalysis, Microscopic Only - Urine, Clean Catch [[id number]]  (Abnormal) Collected: 10/14/24 2017    Specimen: Urine, Clean Catch Updated: 10/14/24 2026     RBC, UA 21-50 /HPF      WBC, UA 0-2 /HPF      Bacteria, UA None Seen /HPF      Squamous Epithelial Cells, UA 0-2 /HPF      Hyaline Casts, UA 7-12 /LPF      Methodology Automated Microscopy             Imaging:    XR Chest 1 View    Result Date: 10/14/2024  XR CHEST 1 VW  Date of Exam: 10/14/2024 5:12 PM EDT Indication: SOA Triage Protocol Comparison: 2/10/2020 Findings: Cardiomediastinal silhouette is prominent with mild pulmonary vascular congestion, similar to prior examination. There is elevation of the right hemidiaphragm. No airspace disease, pneumothorax, nor pleural effusion. No acute osseous abnormality identified.     Impression: 1.Stable chronic findings without acute process identified. Electronically Signed: Bebeto Hancock MD  10/14/2024 5:24 PM EDT  Workstation ID: [id number]       Differential Diagnosis and Discussion:    Weakness: Based on the patient's history, signs, and symptoms, the diffential diagnosis includes but is not limited to meningitis, stroke, sepsis, subarachnoid hemorrhage, intracranial bleeding, encephalitis, acute uti, dehydration, MS, myasthenia gravis, Guillan Leonard, migraine variant, neuromuscular disorders vertigo, electrolyte imbalance, and metabolic disorders.    All labs were reviewed and interpreted by me.  All X-rays impressions were independently interpreted by me.  MRI impression was interpreted by me.     MDM     The patient´s CBC that was reviewed and interpreted by me shows no abnormalities of critical concern. Of note, there is no anemia requiring a blood transfusion and the platelet count is acceptable.  CMP shows a low potassium.  GFR is also decreased.  Magnesium is 1.3.  Patient was given potassium via IV and magnesium.      Patient was placed on the cardiac monitor after being given potassium and magnesium IV.  They were monitored for ventricular ectopy, arrhythmia, tachycardia, hypoxia, and changes in blood pressure.  Patient was rechecked several times throughout their stay for mental status decline and for reassessment of worsening changes in vital signs.     Total Critical Care time of 35 minutes. Total critical care time documented does not include time spent on separately billed procedures for services of nurses or physician  assistants. I personally saw and examined the patient. I have reviewed all diagnostic interpretations and treatment plans as written. I was present for the key portions of any procedures performed and the inclusive time noted in any critical care statement. Critical care time includes patient management by me, time spent at the patients bedside,  time to review lab and imaging results, discussing patient care, documentation in the medical record, and time spent with family or caregiver.          Patient Care Considerations:    None      Consultants/Shared Management Plan:    Case was discussed with Dr. Barragan who agrees with admission.    Social Determinants of Health:    Patient is independent, reliable, and has access to care.       Disposition and Care Coordination:    Admit:   Through independent evaluation of the patient's history, physical, and imperical data, the patient meets criteria for inpatient admission to the hospital.        Final diagnoses:   Hypokalemia   Hypomagnesemia        ED Disposition       ED Disposition   Decision to Admit    Condition   --    Comment   Level of Care: Remote Telemetry [26]   Diagnosis: Generalized weakness [518606]   Admitting Physician: ALEX BARRAGAN [[id number]]                 This medical record created using voice recognition software.             Aung Iyer MD  10/14/24 [id number]

## 2024-10-15 NOTE — H&P
[facility]   HISTORY AND PHYSICAL    Patient Name: Toney Neri  : 1935  MRN: [id number]  Primary Care Physician:  Una Neves APRN  Date of admission: 10/14/2024    Subjective   Subjective     Chief Complaint: Easiness, unsteady gait    HPI:    Toney Neri is a 89 y.o. male with past medical history of hypertension, A-fib on Eliquis, vertigo, CAD, prior MI, TOMI, legal blindness, arthritis, and GERD presented to the ED for evaluation of generalized weakness with an unsteady gait and dizziness.  Patient states that for the last few months he has been having dizziness upon standing with unsteady gait denies any syncopal events or falls.  Over the last few days though the his dizziness has become more frequent and family became concerned so he was brought to the ER for evaluation.  Of note patient is legally blind and is unsure if the dizziness is due to his vision or new onset symptoms.  In the ED patient vitals were all within normal limits on arrival.  Labs showed that he had hypokalemia, hypomagnesemia, anemia, and elevated proBNP with remaining labs being relatively unremarkable including a normal proBNP.  UA did show some hematuria.  MRI was negative for any acute findings and chest x-ray was negative as well.  When seen patient resting comfortably and stated that he still be somewhat dizzy when he walked but denied seeing the room spinning or any lightheadedness.  When asked he denied any recent fevers, chills, headaches, focal weakness, chest pain, palpitation, shortness of breath, cough, abdominal pain, nausea, vomiting, diarrhea, constipation, dysuria, hematuria, hematochezia, melena, or anxiety.  Patient admitted for further evaluation and treatment    Review of Systems   All systems were reviewed and negative except for: As per HPI    Personal History     Past Medical History:   Diagnosis Date    Acid reflux     Arthritis     Atrial fibrillation     [place] FOLLOWS PT,     Cancer     Coronary artery disease     NO INTERVENTION    Diabetes mellitus, type 2     METFORMIN    Elevated PSA     Former smoker     INHALERS: TRELEGY, ALBUTEROL, NON SPECIFIC LUNG HX    High cholesterol     Hypertension     Limb swelling     Myocardial infarction     NO INTERVENTION, APPROX 2010    Skin lesion     RECENT SKIN CA REMOVED FROM FACE    Sleep apnea     NO CPAP    Urinary stream slowing        Past Surgical History:   Procedure Laterality Date    ANKLE SURGERY Right 1989    ankle repair    BICEPS TENDON REPAIR Right     COLONOSCOPY  2012    CYSTOSCOPY      CYSTOSCOPY RETROGRADE PYELOGRAM Bilateral 4/26/2022    Procedure: CYSTOSCOPY RETROGRADE PYELOGRAM;  Surgeon: Yoli Le MD;  Location: [facility] MAIN OR;  Service: Urology;  Laterality: Bilateral;    CYSTOSCOPY RETROGRADE PYELOGRAM Bilateral 3/21/2023    Procedure: CYSTOSCOPY RETROGRADE PYELOGRAM;  Surgeon: Yoli Le MD;  Location: [facility] MAIN OR;  Service: Urology;  Laterality: Bilateral;    CYSTOSCOPY RETROGRADE PYELOGRAM Bilateral 10/10/2024    Procedure: CYSTOSCOPY RETROGRADE PYELOGRAM;  Surgeon: Yoli Le MD;  Location: [facility] MAIN OR;  Service: Urology;  Laterality: Bilateral;    ENDOSCOPY  2018 2020    GALLBLADDER SURGERY      ROTATOR CUFF REPAIR Bilateral 1988 1990    TONSILLECTOMY      TRANSURETHRAL RESECTION OF BLADDER TUMOR N/A 08/19/2021    Procedure: CYSTOSCOPY TRANSURETHRAL RESECTION OF BLADDER TUMOR;  Surgeon: Yoli Le MD;  Location: [facility] MAIN OR;  Service: Urology;  Laterality: N/A;    TRANSURETHRAL RESECTION OF BLADDER TUMOR N/A 4/26/2022    Procedure: CYSTOSCOPY TRANSURETHRAL RESECTION OF BLADDER TUMOR;  Surgeon: Yoli Le MD;  Location: [facility] MAIN OR;  Service: Urology;  Laterality: N/A;    TRANSURETHRAL RESECTION OF BLADDER TUMOR N/A 11/10/2022    Procedure: CYSTOSCOPY TRANSURETHRAL RESECTION OF BLADDER TUMOR;  Surgeon: Yoli Le MD;  Location: [facility] MAIN OR;  Service:  Urology;  Laterality: N/A;    TRANSURETHRAL RESECTION OF BLADDER TUMOR N/A 3/21/2023    Procedure: CYSTOSCOPY TRANSURETHRAL RESECTION OF BLADDER TUMOR;  Surgeon: Yoli Le MD;  Location: [facility] MAIN OR;  Service: Urology;  Laterality: N/A;       Family History: family history includes Arthritis in his father and mother; Diabetes in an other family member; Heart disease in his mother; Prostate cancer in his father and son. Otherwise pertinent FHx was reviewed and not pertinent to current issue.    Social History:  reports that he has quit smoking. His smoking use included cigarettes. He has never used smokeless tobacco. He reports current alcohol use. He reports that he does not use drugs.    Home Medications:  Albuterol-Budesonide, Fluticasone-Umeclidin-Vilant, Magnesium Oxide -Mg Supplement, Melatonin, NIFEdipine CC, acetaminophen, albuterol sulfate HFA, apixaban, aspirin, atenolol, carboxymethylcellulose, finasteride, flunisolide, hydroCHLOROthiazide, loratadine, meclizine, metFORMIN, multivitamins-minerals, potassium chloride, simvastatin, terazosin, and vitamin C      Allergies:  No Known Allergies    Objective   Objective     Vitals:   Temp:  [97.5 °F (36.4 °C)-98.1 °F (36.7 °C)] 98.1 °F (36.7 °C)  Heart Rate:  [] 88  Resp:  [12-18] 16  BP: (100-167)/() 151/81  Physical Exam    Constitutional: Awake, alert   Eyes: PERRLA, sclerae anicteric, limited visual acuity   HENT: NCAT, mucous membranes moist   Neck: Supple, no thyromegaly, no lymphadenopathy, trachea midline   Respiratory: Clear to auscultation bilaterally, nonlabored respirations    Cardiovascular: RRR, systolic murmur, rubs, or gallops, palpable pedal pulses bilaterally   Gastrointestinal: Positive bowel sounds, soft, nontender, nondistended   Musculoskeletal: Bilateral lower extremity edema, no clubbing or cyanosis to extremities   Psychiatric: Appropriate affect, cooperative   Neurologic: Oriented x 3, strength symmetric in all  extremities, Cranial Nerves grossly intact to confrontation, speech clear   Skin: No rashes     Result Review    Result Review:  I have personally reviewed the results from the time of this admission to 10/15/2024 03:12 EDT and agree with these findings:  [x]  Laboratory list / accordion  []  Microbiology  [x]  Radiology  [x]  EKG/Telemetry   []  Cardiology/Vascular   []  Pathology  []  Old records  []  Other:  Most notable findings include: Elevated proBNP, hypomagnesemia, hypokalemia, UA with hematuria, MRI negative, chest x-ray negative      Assessment & Plan   Assessment / Plan     Brief Patient Summary:  Toney Neri is a 89 y.o. male with past medical history of hypertension, A-fib on Eliquis, vertigo, CAD, prior MI, TOMI, legal blindness, arthritis, and GERD presented to the ED for evaluation of generalized weakness with an unsteady gait and dizziness.    Active Hospital Problems:  Active Hospital Problems    Diagnosis     **Generalized weakness     Hypokalemia     Hypomagnesemia     Unsteady gait     Dizziness     Sleep apnea     Permanent atrial fibrillation     Hypertension, essential     Malignant neoplasm of urinary bladder     Type 2 diabetes mellitus     Gastroesophageal reflux disease      Plan:     Dizziness upon standing, unsteady gait  -Admit to telemetry  -Patient symptomatic for months  -Patient unsteady with dizziness  -Denies syncopal events or falls  -Labs with electrolyte imbalances  -UA with hematuria, mild anemia  -MRI of the head unremarkable  -Will order echocardiogram to rule out severe aortic stenosis  -Fall precautions  -Patient with diabetes and admits to polyuria.  Possible dehydration  -IVF  -PT OT  -Workup as needed  -Supportive care    Diabetes  -Insulin sliding scale  -Titrate as needed    HTN  -Currently well controlled  -PRN BP meds  -Resume home meds when available  -Titrate if needed    Electrolyte imbalances  -Will supplement  -Follow labs   -UA  reviewed    A-fib  -Resume Eliquis    CAD  Prior MI  Blindness  Arthritis    GI ppx  DVT ppx    VTE Prophylaxis:  Pharmacologic VTE prophylaxis orders are present.        CODE STATUS:    Level Of Support Discussed With: Patient  Code Status (Patient has no pulse and is not breathing): CPR (Attempt to Resuscitate)  Medical Interventions (Patient has pulse or is breathing): Full Support    Admission Status:  I believe this patient meets duration status.      Electronically signed by Osvaldo George MD, 10/15/24, 3:12 AM EDT.

## 2024-10-16 ENCOUNTER — READMISSION MANAGEMENT (OUTPATIENT)
Dept: CALL CENTER | Facility: HOSPITAL | Age: 89
End: 2024-10-16
Payer: MEDICARE

## 2024-10-16 VITALS
HEART RATE: 83 BPM | RESPIRATION RATE: 16 BRPM | WEIGHT: 249.12 LBS | BODY MASS INDEX: 35.66 KG/M2 | DIASTOLIC BLOOD PRESSURE: 97 MMHG | SYSTOLIC BLOOD PRESSURE: 172 MMHG | OXYGEN SATURATION: 98 % | HEIGHT: 70 IN | TEMPERATURE: 97.7 F

## 2024-10-16 LAB
ANION GAP SERPL CALCULATED.3IONS-SCNC: 10.2 MMOL/L (ref 5–15)
BUN SERPL-MCNC: 15 MG/DL (ref 8–23)
BUN/CREAT SERPL: 13.6 (ref 7–25)
CALCIUM SPEC-SCNC: 9.4 MG/DL (ref 8.6–10.5)
CHLORIDE SERPL-SCNC: 101 MMOL/L (ref 98–107)
CO2 SERPL-SCNC: 28.8 MMOL/L (ref 22–29)
CREAT SERPL-MCNC: 1.1 MG/DL (ref 0.76–1.27)
DEPRECATED RDW RBC AUTO: 50.3 FL (ref 37–54)
EGFRCR SERPLBLD CKD-EPI 2021: 64.2 ML/MIN/1.73
ERYTHROCYTE [DISTWIDTH] IN BLOOD BY AUTOMATED COUNT: 13.9 % (ref 12.3–15.4)
GLUCOSE BLDC GLUCOMTR-MCNC: 174 MG/DL (ref 70–99)
GLUCOSE BLDC GLUCOMTR-MCNC: 208 MG/DL (ref 70–99)
GLUCOSE SERPL-MCNC: 162 MG/DL (ref 65–99)
HCT VFR BLD AUTO: 39.7 % (ref 37.5–51)
HGB BLD-MCNC: 13.1 G/DL (ref 13–17.7)
MCH RBC QN AUTO: 32.3 PG (ref 26.6–33)
MCHC RBC AUTO-ENTMCNC: 33 G/DL (ref 31.5–35.7)
MCV RBC AUTO: 98 FL (ref 79–97)
PLATELET # BLD AUTO: 200 10*3/MM3 (ref 140–450)
PMV BLD AUTO: 11.2 FL (ref 6–12)
POTASSIUM SERPL-SCNC: 3.2 MMOL/L (ref 3.5–5.2)
RBC # BLD AUTO: 4.05 10*6/MM3 (ref 4.14–5.8)
SODIUM SERPL-SCNC: 140 MMOL/L (ref 136–145)
WBC NRBC COR # BLD AUTO: 11.3 10*3/MM3 (ref 3.4–10.8)

## 2024-10-16 PROCEDURE — 82948 REAGENT STRIP/BLOOD GLUCOSE: CPT

## 2024-10-16 PROCEDURE — 99239 HOSP IP/OBS DSCHRG MGMT >30: CPT | Performed by: INTERNAL MEDICINE

## 2024-10-16 PROCEDURE — 94664 DEMO&/EVAL PT USE INHALER: CPT

## 2024-10-16 PROCEDURE — 94799 UNLISTED PULMONARY SVC/PX: CPT

## 2024-10-16 PROCEDURE — 80048 BASIC METABOLIC PNL TOTAL CA: CPT | Performed by: FAMILY MEDICINE

## 2024-10-16 PROCEDURE — 63710000001 INSULIN LISPRO (HUMAN) PER 5 UNITS: Performed by: FAMILY MEDICINE

## 2024-10-16 PROCEDURE — 82948 REAGENT STRIP/BLOOD GLUCOSE: CPT | Performed by: FAMILY MEDICINE

## 2024-10-16 PROCEDURE — 85027 COMPLETE CBC AUTOMATED: CPT | Performed by: FAMILY MEDICINE

## 2024-10-16 RX ORDER — POTASSIUM CHLORIDE 750 MG/1
40 CAPSULE, EXTENDED RELEASE ORAL ONCE
Status: COMPLETED | OUTPATIENT
Start: 2024-10-16 | End: 2024-10-16

## 2024-10-16 RX ORDER — ATENOLOL 25 MG/1
50 TABLET ORAL
Status: DISCONTINUED | OUTPATIENT
Start: 2024-10-16 | End: 2024-10-16 | Stop reason: HOSPADM

## 2024-10-16 RX ADMIN — ATENOLOL 50 MG: 25 TABLET ORAL at 08:17

## 2024-10-16 RX ADMIN — ASPIRIN 81 MG: 81 TABLET, COATED ORAL at 08:17

## 2024-10-16 RX ADMIN — APIXABAN 5 MG: 5 TABLET, FILM COATED ORAL at 08:17

## 2024-10-16 RX ADMIN — TIOTROPIUM BROMIDE INHALATION SPRAY 2 PUFF: 3.12 SPRAY, METERED RESPIRATORY (INHALATION) at 07:52

## 2024-10-16 RX ADMIN — BUDESONIDE AND FORMOTEROL FUMARATE DIHYDRATE 2 PUFF: 160; 4.5 AEROSOL RESPIRATORY (INHALATION) at 07:52

## 2024-10-16 RX ADMIN — INSULIN LISPRO 2 UNITS: 100 INJECTION, SOLUTION INTRAVENOUS; SUBCUTANEOUS at 08:17

## 2024-10-16 RX ADMIN — Medication 10 ML: at 08:17

## 2024-10-16 RX ADMIN — POTASSIUM CHLORIDE 40 MEQ: 750 CAPSULE, EXTENDED RELEASE ORAL at 08:17

## 2024-10-16 RX ADMIN — INSULIN LISPRO 4 UNITS: 100 INJECTION, SOLUTION INTRAVENOUS; SUBCUTANEOUS at 12:16

## 2024-10-16 NOTE — PLAN OF CARE
Goal Outcome Evaluation:  Plan of Care Reviewed With: patient, child        Progress: improving  Outcome Evaluation: VSS. Patient AAO x4. Blood sugars monitored and maintained with SSI. Patient is discharging home with son. No other concerns or complaints.

## 2024-10-16 NOTE — DISCHARGE SUMMARY
[facility]         HOSPITALIST  DISCHARGE SUMMARY    Patient Name: Toney Neri  : 1935  MRN: [id number]    Date of Admission: 10/14/2024  Date of Discharge:  10/17/2024  Primary Care Physician: Una Neves APRN    Consults       Date and Time Order Name Status Description    10/14/2024 10:01 PM Inpatient Hospitalist Consult              Active and Resolved Hospital Problems:  Dizziness, chronic  Hypokalemia  Essential hypertension  T2DM  Permanent Afib on Eliquis  History of CAD  BPH  Legal blindness    Hospital Course     Hospital Course:  Toney Neri is a 89 y.o. male, legally blind with coronary artery disease, permanent atrial fibrillation on Eliquis, BPH who presented with chronic dizziness and unsteady gait which he reported have been going on for years.  No syncope.  No evidence of ACS.  Initial blood pressure soft.  Orthostatic vital signs negative.  MRI brain no acute finding.  Remained in NSR with adequate heart rate control.  Echo unremarkable, no significant aortic stenosis.  Several medications with potential side effect of dizziness/hypotension discontinued and blood pressure medication reduced.  Was able to ambulate independently and wanted to go home.  Medication changes discussed along with home blood pressure monitoring.  Recommended close follow-up with PCP.  Discharged home in stable condition    Day of Discharge     Vital Signs:  Temp:  [97.7 °F (36.5 °C)-98.2 °F (36.8 °C)] 97.7 °F (36.5 °C)  Heart Rate:  [75-98] 83  Resp:  [16-20] 16  BP: (153-176)/() 172/97  Physical Exam:   GENERAL: conversant and nontoxic.  HEART: RRR. No edema  LUNGS: Clear. nonlabored  ABDOMEN: Soft, nondistended  NEUROLOGIC: Alert, CN intact    Discharge Details        Discharge Medications        Continue These Medications        Instructions Start Date   acetaminophen 325 MG tablet  Commonly known as: TYLENOL   325 mg, Daily      Airsupra 90-80 MCG/ACT aerosol  Generic  drug: Albuterol-Budesonide   2 puffs, As Needed      albuterol sulfate  (90 Base) MCG/ACT inhaler  Commonly known as: PROVENTIL HFA;VENTOLIN HFA;PROAIR HFA   2 puffs, Every 4 Hours PRN      Aspirin Low Dose 81 MG EC tablet  Generic drug: aspirin   81 mg, Daily      atenolol 50 MG tablet  Commonly known as: TENORMIN   50 mg, Every Morning      carboxymethylcellulose 0.5 % solution  Commonly known as: REFRESH PLUS   1 drop, 4 Times Daily      Eliquis 5 MG tablet tablet  Generic drug: apixaban   5 mg, 2 times daily      finasteride 5 MG tablet  Commonly known as: PROSCAR   5 mg, Daily      loratadine 10 MG tablet  Commonly known as: CLARITIN   10 mg, Every Morning      Magnesium Oxide -Mg Supplement 400 (240 Mg) MG tablet   400 mg, Daily      meclizine 25 MG tablet  Commonly known as: ANTIVERT   25 mg, Oral, 3 Times Daily PRN      Melatonin 3 MG capsule   3 mg, 2 Times Daily      metFORMIN 1000 MG tablet  Commonly known as: GLUCOPHAGE   1,000 mg, 2 Times Daily      multivitamins-minerals capsule capsule   1 capsule, 2 Times Daily      NIFEdipine CC 90 MG 24 hr tablet  Commonly known as: ADALAT CC   90 mg, Daily      potassium chloride 10 MEQ CR tablet   10 mEq, Daily      simvastatin 40 MG tablet  Commonly known as: ZOCOR   40 mg, Nightly      Trelegy Ellipta 200-62.5-25 MCG/ACT inhaler  Generic drug: Fluticasone-Umeclidin-Vilant   1 puff, Daily - RT      vitamin C 250 MG tablet  Commonly known as: ASCORBIC ACID   250 mg, Daily             Stop These Medications      flunisolide 25 MCG/ACT (0.025%) solution nasal spray  Commonly known as: NASALIDE     hydroCHLOROthiazide 25 MG tablet     terazosin 10 MG capsule  Commonly known as: HYTRIN              No Known Allergies    Discharge Disposition:  Home or Self Care    Diet:  Hospital:  Diet Order   Procedures   • Diet: Cardiac; Healthy Heart (2-3 Na+); Fluid Consistency: Thin (IDDSI 0)       Discharge Activity:   Activity Instructions       Up WIth Assist               CODE STATUS:  Code Status and Medical Interventions: CPR (Attempt to Resuscitate); Full Support   Ordered at: 10/14/24 2300     Level Of Support Discussed With:    Patient     Code Status (Patient has no pulse and is not breathing):    CPR (Attempt to Resuscitate)     Medical Interventions (Patient has pulse or is breathing):    Full Support         Future Appointments   Date Time Provider Department Center   3/7/2025 11:30 AM Jacobo Sweet [place] [facility] [place] [facility]   4/16/2025  1:30 PM Yoli Le [place] [facility] [place]       Additional Instructions for the Follow-ups that You Need to Schedule       Discharge Follow-up with PCP   As directed       Currently Documented PCP:    Una Neves APRN    PCP Phone Number:    [phone]     Follow Up Details: 1 week                Pertinent  and/or Most Recent Results     PROCEDURES:   NONE    LAB RESULTS:      Lab 10/16/24  0532 10/15/24  0542 10/14/24  1709   WBC 11.30* 11.24* 10.75   HEMOGLOBIN 13.1 12.9* 12.1*   HEMATOCRIT 39.7 38.5 36.9*   PLATELETS 200 211 233   NEUTROS ABS  --   --  7.24*   IMMATURE GRANS (ABS)  --   --  0.05   LYMPHS ABS  --   --  2.31   MONOS ABS  --   --  0.98*   EOS ABS  --   --  0.12   MCV 98.0* 96.7 97.1*         Lab 10/16/24  0532 10/15/24  0542 10/14/24  1742   SODIUM 140 141 136   POTASSIUM 3.2* 3.1* 2.9*   CHLORIDE 101 100 94*   CO2 28.8 29.6* 27.5   ANION GAP 10.2 11.4 14.5   BUN 15 16 19   CREATININE 1.10 1.22 1.56*   EGFR 64.2 56.7* 42.2*   GLUCOSE 162* 166* 216*   CALCIUM 9.4 9.2 8.6   MAGNESIUM  --  1.7 1.3*         Lab 10/14/24  1742   TOTAL PROTEIN 6.7   ALBUMIN 3.8   GLOBULIN 2.9   ALT (SGPT) 12   AST (SGOT) 18   BILIRUBIN 0.4   ALK PHOS 57         Lab 10/14/24  1742   PROBNP 3,193.0*   HSTROP T 27*                 Brief Urine Lab Results  (Last result in the past 365 days)        Color   Clarity   Blood   Leuk Est   Nitrite   Protein   CREAT   Urine HCG        10/14/24 2017 Yellow   Clear   Moderate (2+)    Negative   Negative   Trace                 Microbiology Results (last 10 days)       ** No results found for the last 240 hours. **            MRI Brain Without Contrast    Result Date: 10/14/2024  Impression: No acute intracranial finding. Electronically Signed: Miquel CATHIE Mrelinmichael  10/14/2024 10:56 PM EDT  Workstation ID: [id number]    XR Chest 1 View    Result Date: 10/14/2024  Impression: 1.Stable chronic findings without acute process identified. Electronically Signed: Bebeto Hancock MD  10/14/2024 5:24 PM EDT  Workstation ID: [id number]      Results for orders placed during the hospital encounter of 08/04/21    Duplex Carotid Ultrasound CAR    Interpretation Summary  · Proximal right internal carotid artery plaque without significant stenosis.  · All other left side carotid system vessels are normal.  · All other right side carotid system vessels are normal.      Results for orders placed during the hospital encounter of 08/04/21    Duplex Carotid Ultrasound CAR    Interpretation Summary  · Proximal right internal carotid artery plaque without significant stenosis.  · All other left side carotid system vessels are normal.  · All other right side carotid system vessels are normal.      Results for orders placed during the hospital encounter of 10/14/24    Adult Transthoracic Echo Complete W/ Cont if Necessary Per Protocol    Interpretation Summary  Normal left ventricular systolic function with mild left ventricular hypertrophy.  Mild aortic stenosis.  Mild mitral regurgitation.      Labs Pending at Discharge:    Time spent on Discharge including face to face service: >30 minutes    Electronically signed by Rd Morillo DO, 10/16/24, 1:38 PM EDT.

## 2024-10-16 NOTE — PROGRESS NOTES
[facility]   Hospitalist Progress Note  Date: 10/16/2024  Patient Name: Toney Neri  : 1935  MRN: [id number]  Date of admission: 10/14/2024      Subjective   Subjective     Chief Complaint: Follow up for dizziness    Summary:   89-year-old male with CAD, permanent A-fib on Eliquis, BPH, legally blind who presented with dizziness, unsteady gait.  Chronic issue, on and off for years.  No syncope.  Blood pressure soft.  MRI brain unremarkable.  Suspect medication related.  TTE unremarkable    Interval Followup:   Afebrile.  Heart rate overall controlled.  Blood pressures back up to 160s over 90s  Denies vertigo  Denies lightheadedness, tunnel vision, diplopia  No headache  No focal weakness or paresthesia  No neck pain or spasm  Denies recent decline in mobility or exercise tolerance    Objective   Objective     Vitals:   Temp:  [97.9 °F (36.6 °C)-98.2 °F (36.8 °C)] 97.9 °F (36.6 °C)  Heart Rate:  [77-94] 85  Resp:  [16-20] 20  BP: (153-169)/(73-96) 153/78  Physical Exam    Constitutional: conversant, NAD   Respiratory:  nonlabored respirations    Cardiovascular:  IR/IR, no murmurs, no edema   Gastrointestinal: soft, nondistended   Neurologic: Alert, speech clear   Skin: Extremities warm    Result Review    Result Review:  I have personally reviewed the following over the last 24 hours (07:00 to 07:00) and agree with the following findings  [x]  Laboratory  CBC          10/14/2024    17:09 10/15/2024    05:42 10/16/2024    05:32   CBC   WBC 10.75  11.24  11.30    RBC 3.80  3.98  4.05    Hemoglobin 12.1  12.9  13.1    Hematocrit 36.9  38.5  39.7    MCV 97.1  96.7  98.0    MCH 31.8  32.4  32.3    MCHC 32.8  33.5  33.0    RDW 14.0  13.7  13.9    Platelets 233  211  200      BMP          10/14/2024    17:42 10/15/2024    05:42 10/16/2024    05:32   BMP   BUN 19  16  15    Creatinine 1.56  1.22  1.10    Sodium 136  141  140    Potassium 2.9  3.1  3.2    Chloride 94  100  101    CO2 27.5  29.6  28.8     Calcium 8.6  9.2  9.4      []  Microbiology  [x]  Radiology   Adult Transthoracic Echo Complete W/ Cont if Necessary Per Protocol    Result Date: 10/15/2024  Normal left ventricular systolic function with mild left ventricular hypertrophy. Mild aortic stenosis. Mild mitral regurgitation.     MRI Brain Without Contrast    Result Date: 10/14/2024  MRI BRAIN WO CONTRAST Date of Exam: 10/14/2024 10:22 PM EDT Indication: Strokelike symptoms evaluate for stroke.  Comparison: Brain MRI 8/4/2021 Technique:  Routine multiplanar/multisequence sequence images of the brain were obtained without contrast administration. Findings: No acute midline shift, extra axial fluid collection, hydrocephalus, or infarct. Unchanged chronic microhemorrhage in the left parietal lobe. Mild generalized parenchymal volume loss. Unchanged appearing cyst along the left aspect of the anterior falx with FLAIR nonsuppression and without restricted diffusion, likely mildly complex arachnoid cyst, of doubtful clinical significance. Scattered areas of T2/FLAIR hyperintensity in the subcortical and periventricular white matter, nonspecific, perhaps from small vessel ischemic/hypertensive changes in a patient of this age. Appropriate flow voids at the skull base and in the major venous sinuses. Mild mucosal changes in the paranasal sinuses. Mucous retention cyst in the right maxillary sinus. Nonspecific moderate right mastoid effusion. Bilateral cataract lens replacements. No acute or aggressive appearing bone or extracranial soft tissue process.     Impression: No acute intracranial finding. Electronically Signed: Miquel Pires  10/14/2024 10:56 PM EDT  Workstation ID: [id number]    [x]  EKG/Telemetry monitor personally reviewed and independently interpreted: A-fib, fluctuating heart rate, 50s to 130.  Frequent PVCs  [x]  Cardiology/Vascular   TTE  Left Ventricle Left ventricular systolic function is low normal. Calculated left ventricular EF = 49.6%      Normal left ventricular cavity size and wall thickness noted. All left ventricular wall segments contract normally.   Right Ventricle Normal right ventricular cavity size, wall thickness, systolic function and septal motion noted.   Left Atrium The left atrial cavity is dilated. Left atrial volume is normal.   Right Atrium Normal right atrial cavity size noted.   Aortic Valve The aortic valve is abnormal in structure. There is calcification of the aortic valve. Mild aortic valve regurgitation is present. No aortic valve stenosis is present.   Mitral Valve There is calcification of the mitral valve. Mild mitral valve regurgitation is present. No significant mitral valve stenosis is present.   Tricuspid Valve The tricuspid valve is structurally normal with no significant stenosis present. Trace tricuspid valve regurgitation is present. Estimated right ventricular systolic pressure from tricuspid regurgitation is normal (<35 mmHg).   Pulmonic Valve The pulmonic valve is structurally normal with no regurgitation or significant stenosis present.   Greater Vessels No dilation of the aortic root is present.   Pericardium The pericardium is normal. There is no evidence of pericardial effusion. .     []  Pathology  []  Old records  []  Other:    Assessment & Plan   Assessment / Plan     Assessment/Plan:  Dizziness, chronic  Hypokalemia  HTN  T2DM  Permanent Afib on Eliquis  History of CAD         Did come in with a blood pressure 100/52 with heart rate in the 70s  Remains in A-fib.  Heart rate not significantly elevated but quite variable and frequent PVCs noted  Is noted to be on both atenolol and nifedipine  Blood pressures back in 160/90s.  Restart atenolol 50 mg daily and depending on bp trend will restart nifedipine  Discontinue HCTZ given hypokalemia and adverse effect profile  Replace potassium with oral supplementation  Discontinue terazosin as does have the potential to cause dizziness and orthostatic  hypotension  TTE EF around 50%, no wall motion abnormalities, no significant valvular disease  Recheck orthostatic vitals  Continue aspirin and statin  Hemoglobin 13.1.  Denies bleeding events.  Continue Eliquis 5 mg every 12 hours for stroke prevention  Continue atorvastatin      Discussed plan with RN.    VTE Prophylaxis:  Pharmacologic VTE prophylaxis orders are present.        CODE STATUS:   Level Of Support Discussed With: Patient  Code Status (Patient has no pulse and is not breathing): CPR (Attempt to Resuscitate)  Medical Interventions (Patient has pulse or is breathing): Full Support    Electronically signed by Rd Morillo DO, 10/16/24, 12:04 PM EDT.

## 2024-10-16 NOTE — PLAN OF CARE
Goal Outcome Evaluation:  Plan of Care Reviewed With: patient        Progress: improving  Outcome Evaluation: patient a/ox4, afib pvcs on the monitor, rate controlled. patient up with stand by assist, patient knows to call for any assistance when OOB. BG managed with SS orders. no complaints of pain, weakness, or shortness of breath. call light within reach, rounding ongoing. continue plan of care.

## 2024-10-17 NOTE — OUTREACH NOTE
Prep Survey      Flowsheet Row Responses   [facility] facility patient discharged from? [facility]   Is LACE score < 7 ? No   Eligibility Readm Mgmt   Discharge diagnosis Generalized weakness   Does the patient have one of the following disease processes/diagnoses(primary or secondary)? Other   Does the patient have Home health ordered? No   Is there a DME ordered? No   Prep survey completed? Yes            Camila FELIZ - Registered Nurse

## 2024-10-22 ENCOUNTER — READMISSION MANAGEMENT (OUTPATIENT)
Dept: CALL CENTER | Facility: HOSPITAL | Age: 89
End: 2024-10-22
Payer: MEDICARE

## 2024-10-22 NOTE — OUTREACH NOTE
Medical Week 1 Survey      Flowsheet Row Responses   [facility] patient discharged from? [facility]   Does the patient have one of the following disease processes/diagnoses(primary or secondary)? Other   Week 1 attempt successful? Yes   Call start time 1057   Call end time 1059   Discharge diagnosis Generalized weakness   Meds reviewed with patient/caregiver? Yes   Is the patient having any side effects they believe may be caused by any medication additions or changes? No   Does the patient have all medications ordered at discharge? Yes   Is the patient taking all medications as directed (includes completed medication regime)? Yes   Does the patient have a primary care provider?  Yes   Comments regarding PCP PCP apt on 10/23/24   Has the patient kept scheduled appointments due by today? N/A   Has home health visited the patient within 72 hours of discharge? N/A   Did the patient receive a copy of their discharge instructions? Yes   Nursing interventions Reviewed instructions with patient   What is the patient's perception of their health status since discharge? Improving   Is the patient/caregiver able to teach back signs and symptoms related to disease process for when to call PCP? Yes   Is the patient/caregiver able to teach back signs and symptoms related to disease process for when to call 911? Yes   Is the patient/caregiver able to teach back the hierarchy of who to call/visit for symptoms/problems? PCP, Specialist, Home health nurse, Urgent Care, ED, 911 Yes   If the patient is a current smoker, are they able to teach back resources for cessation? Not a smoker   Week 1 call completed? Yes   Call end time 1059            Lynne H - Registered Nurse

## 2024-10-30 ENCOUNTER — READMISSION MANAGEMENT (OUTPATIENT)
Dept: CALL CENTER | Facility: HOSPITAL | Age: 89
End: 2024-10-30
Payer: MEDICARE

## 2024-10-30 NOTE — OUTREACH NOTE
Medical Week 2 Survey      Flowsheet Row Responses   [facility] patient discharged from? [facility]   Does the patient have one of the following disease processes/diagnoses(primary or secondary)? Other   Week 2 attempt successful? Yes   Call start time 1157   Discharge diagnosis Generalized weakness   Call end time 1159   Is the patient taking all medications as directed (includes completed medication regime)? Yes   Does the patient have a primary care provider?  Yes   Does the patient have an appointment with their PCP within 7 days of discharge? Greater than 7 days   Comments 11/6 with PCP   Psychosocial issues? No   What is the patient's perception of their health status since discharge? Improving   Is the patient/caregiver able to teach back signs and symptoms related to disease process for when to call PCP? Yes   Is the patient/caregiver able to teach back signs and symptoms related to disease process for when to call 911? Yes   Is the patient/caregiver able to teach back the hierarchy of who to call/visit for symptoms/problems? PCP, Specialist, Home health nurse, Urgent Care, ED, 911 Yes   If the patient is a current smoker, are they able to teach back resources for cessation? Not a smoker   Additional teach back comments States he is doing well.   Week 2 Call Completed? Yes   Graduated Yes   Graduated/Revoked comments No questions or needs   Call end time 1159            Madison HARRIS - Licensed Nurse

## 2024-11-04 ENCOUNTER — TELEPHONE (OUTPATIENT)
Dept: UROLOGY | Age: 89
End: 2024-11-04
Payer: MEDICARE

## 2024-11-04 ENCOUNTER — OFFICE VISIT (OUTPATIENT)
Dept: UROLOGY | Age: 89
End: 2024-11-04
Payer: MEDICARE

## 2024-11-04 VITALS
HEIGHT: 70 IN | WEIGHT: 249 LBS | BODY MASS INDEX: 35.65 KG/M2 | SYSTOLIC BLOOD PRESSURE: 142 MMHG | DIASTOLIC BLOOD PRESSURE: 91 MMHG

## 2024-11-04 DIAGNOSIS — N30.01 ACUTE CYSTITIS WITH HEMATURIA: ICD-10-CM

## 2024-11-04 DIAGNOSIS — C67.8 MALIGNANT NEOPLASM OF OVERLAPPING SITES OF BLADDER: Primary | ICD-10-CM

## 2024-11-04 LAB
BILIRUB BLD-MCNC: NEGATIVE MG/DL
CLARITY, POC: CLEAR
COLOR UR: YELLOW
EXPIRATION DATE: ABNORMAL
GLUCOSE UR STRIP-MCNC: NEGATIVE MG/DL
KETONES UR QL: NEGATIVE
LEUKOCYTE EST, POC: ABNORMAL
Lab: ABNORMAL
NITRITE UR-MCNC: NEGATIVE MG/ML
PH UR: 5.5 [PH] (ref 5–8)
PROT UR STRIP-MCNC: ABNORMAL MG/DL
RBC # UR STRIP: ABNORMAL /UL
SP GR UR: 1.01 (ref 1–1.03)
URINE VOLUME: NORMAL
UROBILINOGEN UR QL: ABNORMAL

## 2024-11-04 PROCEDURE — 99213 OFFICE O/P EST LOW 20 MIN: CPT | Performed by: UROLOGY

## 2024-11-04 PROCEDURE — 87186 SC STD MICRODIL/AGAR DIL: CPT | Performed by: UROLOGY

## 2024-11-04 PROCEDURE — 87086 URINE CULTURE/COLONY COUNT: CPT | Performed by: UROLOGY

## 2024-11-04 PROCEDURE — 1160F RVW MEDS BY RX/DR IN RCRD: CPT | Performed by: UROLOGY

## 2024-11-04 PROCEDURE — 81003 URINALYSIS AUTO W/O SCOPE: CPT | Performed by: UROLOGY

## 2024-11-04 PROCEDURE — 1159F MED LIST DOCD IN RCRD: CPT | Performed by: UROLOGY

## 2024-11-04 PROCEDURE — 51798 US URINE CAPACITY MEASURE: CPT | Performed by: UROLOGY

## 2024-11-04 PROCEDURE — 87077 CULTURE AEROBIC IDENTIFY: CPT | Performed by: UROLOGY

## 2024-11-04 RX ORDER — SULFAMETHOXAZOLE AND TRIMETHOPRIM 800; 160 MG/1; MG/1
1 TABLET ORAL 2 TIMES DAILY
Qty: 14 TABLET | Refills: 0 | Status: SHIPPED | OUTPATIENT
Start: 2024-11-04 | End: 2024-11-11

## 2024-11-04 NOTE — TELEPHONE ENCOUNTER
CYSTOSCOPY BILATERAL RETROGRADE PYELOGRAM  TRANSURETHRAL RESECTION OF BLADDER TUMOR 10/10/24.    PATIENT CALLED AND SAID, SINCE THE SURGERY, HE CANNOT URINATE, AND EMPTY HIS BLADDER.  HE CANNOT URINATE, OTHER THAN A TRICKLE.  THERE IS NO STREAM.      HE ASKED TO BE CALLED AT #[phone].

## 2024-11-04 NOTE — PROGRESS NOTES
"Chief Complaint  Difficulty Urinating    Subjective          Toney Neri presents to [facility]    History of Present Illness  Patient called the office today with complaints of dribbling difficulty emptying his bladder.  He is having frequency and urgency.  Urine appears infected.  PVR today was 35 mL.  He recently had a TURBT      Objective   Vital Signs:   /91   Ht 177.8 cm (70\")   Wt 113 kg (249 lb)   BMI 35.73 kg/m²       Physical Exam  Vitals and nursing note reviewed.   Constitutional:       Appearance: Normal appearance. He is well-developed.   Pulmonary:      Effort: Pulmonary effort is normal.      Breath sounds: Normal air entry.   Neurological:      Mental Status: He is alert and oriented to person, place, and time.      Motor: Motor function is intact.   Psychiatric:         Mood and Affect: Mood normal.         Behavior: Behavior normal.          Result Review :   The following data was reviewed by: Yoli Le MD on 11/04/2024:    Results for orders placed or performed in visit on 11/04/24   Bladder Scan    Collection Time: 11/04/24  1:30 PM   Result Value Ref Range    Urine Volume 35ml    POC Urinalysis Dipstick, Automated    Collection Time: 11/04/24  1:31 PM    Specimen: Urine   Result Value Ref Range    Color Yellow Yellow, Straw, Dark Yellow, Lynne    Clarity, UA Clear Clear    Specific Gravity  1.015 1.005 - 1.030    pH, Urine 5.5 5.0 - 8.0    Leukocytes Moderate (2+) (A) Negative    Nitrite, UA Negative Negative    Protein, POC Trace (A) Negative mg/dL    Glucose, UA Negative Negative mg/dL    Ketones, UA Negative Negative    Urobilinogen, UA 0.2 E.U./dL Normal, 0.2 E.U./dL    Bilirubin Negative Negative    Blood, UA Moderate (A) Negative    Lot Number [id number]     Expiration Date 82,025           Bladder Scan interpretation 11/04/2024    Estimation of residual urine via Red ZebraI 3000 Verathon Bladder Scan  MA/nurse performing: Angelita Agee, " MA  Residual Urine: 35 ml  Indication: Malignant neoplasm of overlapping sites of bladder    Acute cystitis with hematuria   Position: Supine  Examination: Incremental scanning of the suprapubic area using 2.0 MHz transducer using copious amounts of acoustic gel.   Findings: An anechoic area was demonstrated which represented the bladder, with measurement of residual urine as noted. I inspected this myself. In that the residual urine was stable or insignificant, refer to plan for treatment and plan necessary at this time.            Assessment and Plan    Diagnoses and all orders for this visit:    1. Malignant neoplasm of overlapping sites of bladder (Primary)  -     POC Urinalysis Dipstick, Automated  -     Bladder Scan    2. Acute cystitis with hematuria  -     Urine Culture - Urine, Urine, Clean Catch  -     sulfamethoxazole-trimethoprim (Bactrim DS) 800-160 MG per tablet; Take 1 tablet by mouth 2 (Two) Times a Day for 7 days.  Dispense: 14 tablet; Refill: 0    Patient was here for possible urinary retention but it is emptying well think he just has a UTI.  This in his urine for culture.  Sent in Bactrim.  I will see him back at his next scheduled appointment already in April.        Follow Up       No follow-ups on file.  Patient was given instructions and counseling regarding his condition or for health maintenance advice. Please see specific information pulled into the AVS if appropriate.

## 2024-11-07 DIAGNOSIS — N30.01 ACUTE CYSTITIS WITH HEMATURIA: Primary | ICD-10-CM

## 2024-11-07 LAB — BACTERIA SPEC AEROBE CULT: ABNORMAL

## 2024-11-07 RX ORDER — LEVOFLOXACIN 500 MG/1
500 TABLET, FILM COATED ORAL DAILY
Qty: 7 TABLET | Refills: 0 | Status: SHIPPED | OUTPATIENT
Start: 2024-11-07 | End: 2024-11-14

## 2024-11-07 RX ORDER — LEVOFLOXACIN 500 MG/1
500 TABLET, FILM COATED ORAL DAILY
Qty: 7 TABLET | Refills: 0 | Status: SHIPPED | OUTPATIENT
Start: 2024-11-07 | End: 2024-11-07

## 2024-11-25 ENCOUNTER — TELEPHONE (OUTPATIENT)
Dept: UROLOGY | Age: 89
End: 2024-11-25
Payer: MEDICARE

## 2024-11-25 DIAGNOSIS — N30.01 ACUTE CYSTITIS WITH HEMATURIA: Primary | ICD-10-CM

## 2024-11-25 NOTE — TELEPHONE ENCOUNTER
Caller: AMITA ELLSWORTH    Relationship to patient: SELF    Best call back number: [phone]    Patient is needing: PT IS STILL HAVING PROBLEMS URINATING.  MEDS DID NOT SEEM TO HELP    PLEASE CALL PT TO DISCUSS

## 2024-11-25 NOTE — TELEPHONE ENCOUNTER
LMOM that Dr. Le has placed an order for a urine culture to make sure that he does not have an infection. It will take 48 hours to get the results. So it may be Monday before we contact him with those results.

## 2024-12-02 ENCOUNTER — TELEPHONE (OUTPATIENT)
Dept: UROLOGY | Age: 89
End: 2024-12-02
Payer: MEDICARE

## 2024-12-02 DIAGNOSIS — N40.1 BENIGN PROSTATIC HYPERPLASIA WITH INCOMPLETE BLADDER EMPTYING: Primary | ICD-10-CM

## 2024-12-02 DIAGNOSIS — R39.14 BENIGN PROSTATIC HYPERPLASIA WITH INCOMPLETE BLADDER EMPTYING: Primary | ICD-10-CM

## 2024-12-02 RX ORDER — TAMSULOSIN HYDROCHLORIDE 0.4 MG/1
1 CAPSULE ORAL DAILY
Qty: 90 CAPSULE | Refills: 3 | Status: SHIPPED | OUTPATIENT
Start: 2024-12-02

## 2024-12-02 NOTE — TELEPHONE ENCOUNTER
We should get another culture. I also don't see that he is on flomax.  We can start that as well unless there is a reason he can't take it.  He is already on finasteride.

## 2024-12-02 NOTE — TELEPHONE ENCOUNTER
Caller: AMITA ELLSWORTH    Relationship: SELF    Best call back number:     What is the best time to reach you: ANY, CAN LVM     Who are you requesting to speak with (clinical staff, provider,  specific staff member): DR. HENDRICKS    Do you know the name of the person who called: PATIENT    What was the call regarding: PATIENT HAD HIS PCP TAKE A URINE SAMPLE AND THERE IS NO INFECTION BUT PT SAID HE'S STILL HAVING PROBLEMS WITH URINATING. PLEASE CALL PT BACK TO ADVISE ON IF HE NEEDS TO BE SEEN OR NEEDS MEDICATION. THANK YOU.

## 2024-12-02 NOTE — TELEPHONE ENCOUNTER
Spoke with patient and he states that the flomax was stopped while he was in the hosptial. I explained that Dr. Le wants a urine culture and flomax was called into [facility]. Patient verbalized understanding and will call back with any issues or concerns.

## 2024-12-03 ENCOUNTER — LAB (OUTPATIENT)
Dept: LAB | Facility: HOSPITAL | Age: 89
End: 2024-12-03
Payer: MEDICARE

## 2024-12-03 DIAGNOSIS — N30.01 ACUTE CYSTITIS WITH HEMATURIA: ICD-10-CM

## 2024-12-03 PROCEDURE — 87086 URINE CULTURE/COLONY COUNT: CPT

## 2024-12-04 LAB — BACTERIA SPEC AEROBE CULT: NO GROWTH

## 2024-12-05 ENCOUNTER — TELEPHONE (OUTPATIENT)
Dept: UROLOGY | Age: 89
End: 2024-12-05

## 2024-12-05 NOTE — TELEPHONE ENCOUNTER
Provider: DR. SHAY HENDRICKS    [facility] received a fax that requires your attention. The document has been indexed to the patient's chart for your review.     Reason for sending: REVIEW     Documents Description: LABS UA CULTURE, UA MICRO 11/26/24.     Name of Sender: [facility]     Date Indexed: 12/5/24

## 2024-12-12 ENCOUNTER — HOSPITAL ENCOUNTER (INPATIENT)
Facility: HOSPITAL | Age: 89
LOS: 2 days | Discharge: HOME HEALTH | DRG: 149 | End: 2024-12-16
Attending: EMERGENCY MEDICINE | Admitting: INTERNAL MEDICINE
Payer: MEDICARE

## 2024-12-12 ENCOUNTER — APPOINTMENT (OUTPATIENT)
Dept: GENERAL RADIOLOGY | Facility: HOSPITAL | Age: 89
DRG: 149 | End: 2024-12-12
Payer: MEDICARE

## 2024-12-12 ENCOUNTER — APPOINTMENT (OUTPATIENT)
Dept: MRI IMAGING | Facility: HOSPITAL | Age: 89
DRG: 149 | End: 2024-12-12
Payer: MEDICARE

## 2024-12-12 ENCOUNTER — APPOINTMENT (OUTPATIENT)
Dept: CT IMAGING | Facility: HOSPITAL | Age: 89
DRG: 149 | End: 2024-12-12
Payer: MEDICARE

## 2024-12-12 DIAGNOSIS — J44.9 CHRONIC OBSTRUCTIVE PULMONARY DISEASE, UNSPECIFIED COPD TYPE: ICD-10-CM

## 2024-12-12 DIAGNOSIS — R42 DIZZINESS: Primary | ICD-10-CM

## 2024-12-12 DIAGNOSIS — R26.2 DIFFICULTY IN WALKING: ICD-10-CM

## 2024-12-12 DIAGNOSIS — R13.10 DYSPHAGIA, UNSPECIFIED TYPE: ICD-10-CM

## 2024-12-12 LAB
ALBUMIN SERPL-MCNC: 3.8 G/DL (ref 3.5–5.2)
ALBUMIN/GLOB SERPL: 1.2 G/DL
ALP SERPL-CCNC: 59 U/L (ref 39–117)
ALT SERPL W P-5'-P-CCNC: 14 U/L (ref 1–41)
ANION GAP SERPL CALCULATED.3IONS-SCNC: 12.7 MMOL/L (ref 5–15)
APTT PPP: 43.6 SECONDS (ref 24.2–34.2)
AST SERPL-CCNC: 28 U/L (ref 1–40)
BACTERIA UR QL AUTO: NORMAL /HPF
BASOPHILS # BLD AUTO: 0.08 10*3/MM3 (ref 0–0.2)
BASOPHILS NFR BLD AUTO: 0.7 % (ref 0–1.5)
BILIRUB SERPL-MCNC: 0.4 MG/DL (ref 0–1.2)
BILIRUB UR QL STRIP: NEGATIVE
BUN SERPL-MCNC: 18 MG/DL (ref 8–23)
BUN/CREAT SERPL: 12.5 (ref 7–25)
CALCIUM SPEC-SCNC: 9.1 MG/DL (ref 8.6–10.5)
CHLORIDE SERPL-SCNC: 101 MMOL/L (ref 98–107)
CLARITY UR: CLEAR
CO2 SERPL-SCNC: 22.3 MMOL/L (ref 22–29)
COD CRY URNS QL: NORMAL /HPF
COLOR UR: ABNORMAL
CREAT SERPL-MCNC: 1.44 MG/DL (ref 0.76–1.27)
DEPRECATED RDW RBC AUTO: 49.5 FL (ref 37–54)
EGFRCR SERPLBLD CKD-EPI 2021: 46.4 ML/MIN/1.73
EOSINOPHIL # BLD AUTO: 0.33 10*3/MM3 (ref 0–0.4)
EOSINOPHIL NFR BLD AUTO: 2.8 % (ref 0.3–6.2)
ERYTHROCYTE [DISTWIDTH] IN BLOOD BY AUTOMATED COUNT: 14.2 % (ref 12.3–15.4)
GLOBULIN UR ELPH-MCNC: 3.1 GM/DL
GLUCOSE BLDC GLUCOMTR-MCNC: 174 MG/DL (ref 70–99)
GLUCOSE SERPL-MCNC: 186 MG/DL (ref 65–99)
GLUCOSE UR STRIP-MCNC: NEGATIVE MG/DL
HCT VFR BLD AUTO: 37.7 % (ref 37.5–51)
HGB BLD-MCNC: 12.2 G/DL (ref 13–17.7)
HGB UR QL STRIP.AUTO: NEGATIVE
HOLD SPECIMEN: NORMAL
HOLD SPECIMEN: NORMAL
HYALINE CASTS UR QL AUTO: NORMAL /LPF
IMM GRANULOCYTES # BLD AUTO: 0.04 10*3/MM3 (ref 0–0.05)
IMM GRANULOCYTES NFR BLD AUTO: 0.3 % (ref 0–0.5)
INR PPP: 1.44 (ref 0.86–1.15)
KETONES UR QL STRIP: ABNORMAL
LEUKOCYTE ESTERASE UR QL STRIP.AUTO: ABNORMAL
LYMPHOCYTES # BLD AUTO: 2.78 10*3/MM3 (ref 0.7–3.1)
LYMPHOCYTES NFR BLD AUTO: 24 % (ref 19.6–45.3)
MCH RBC QN AUTO: 31.1 PG (ref 26.6–33)
MCHC RBC AUTO-ENTMCNC: 32.4 G/DL (ref 31.5–35.7)
MCV RBC AUTO: 96.2 FL (ref 79–97)
MONOCYTES # BLD AUTO: 0.95 10*3/MM3 (ref 0.1–0.9)
MONOCYTES NFR BLD AUTO: 8.2 % (ref 5–12)
NEUTROPHILS NFR BLD AUTO: 64 % (ref 42.7–76)
NEUTROPHILS NFR BLD AUTO: 7.4 10*3/MM3 (ref 1.7–7)
NITRITE UR QL STRIP: NEGATIVE
NRBC BLD AUTO-RTO: 0 /100 WBC (ref 0–0.2)
PH UR STRIP.AUTO: 5.5 [PH] (ref 5–8)
PLATELET # BLD AUTO: 257 10*3/MM3 (ref 140–450)
PMV BLD AUTO: 11.6 FL (ref 6–12)
POTASSIUM SERPL-SCNC: 4.3 MMOL/L (ref 3.5–5.2)
PROT SERPL-MCNC: 6.9 G/DL (ref 6–8.5)
PROT UR QL STRIP: ABNORMAL
PROTHROMBIN TIME: 17.9 SECONDS (ref 11.8–14.9)
RBC # BLD AUTO: 3.92 10*6/MM3 (ref 4.14–5.8)
RBC # UR STRIP: NORMAL /HPF
REF LAB TEST METHOD: NORMAL
SODIUM SERPL-SCNC: 136 MMOL/L (ref 136–145)
SP GR UR STRIP: 1.02 (ref 1–1.03)
SQUAMOUS #/AREA URNS HPF: NORMAL /HPF
UROBILINOGEN UR QL STRIP: ABNORMAL
WBC # UR STRIP: NORMAL /HPF
WBC NRBC COR # BLD AUTO: 11.58 10*3/MM3 (ref 3.4–10.8)
WHOLE BLOOD HOLD COAG: NORMAL
WHOLE BLOOD HOLD SPECIMEN: NORMAL

## 2024-12-12 PROCEDURE — A9577 INJ MULTIHANCE: HCPCS | Performed by: EMERGENCY MEDICINE

## 2024-12-12 PROCEDURE — 93005 ELECTROCARDIOGRAM TRACING: CPT | Performed by: EMERGENCY MEDICINE

## 2024-12-12 PROCEDURE — 99222 1ST HOSP IP/OBS MODERATE 55: CPT | Performed by: PSYCHIATRY & NEUROLOGY

## 2024-12-12 PROCEDURE — 99223 1ST HOSP IP/OBS HIGH 75: CPT | Performed by: STUDENT IN AN ORGANIZED HEALTH CARE EDUCATION/TRAINING PROGRAM

## 2024-12-12 PROCEDURE — 99285 EMERGENCY DEPT VISIT HI MDM: CPT

## 2024-12-12 PROCEDURE — 85730 THROMBOPLASTIN TIME PARTIAL: CPT

## 2024-12-12 PROCEDURE — 71045 X-RAY EXAM CHEST 1 VIEW: CPT

## 2024-12-12 PROCEDURE — 85025 COMPLETE CBC W/AUTO DIFF WBC: CPT

## 2024-12-12 PROCEDURE — 70553 MRI BRAIN STEM W/O & W/DYE: CPT

## 2024-12-12 PROCEDURE — 80053 COMPREHEN METABOLIC PANEL: CPT | Performed by: EMERGENCY MEDICINE

## 2024-12-12 PROCEDURE — 70450 CT HEAD/BRAIN W/O DYE: CPT

## 2024-12-12 PROCEDURE — 36415 COLL VENOUS BLD VENIPUNCTURE: CPT

## 2024-12-12 PROCEDURE — G0378 HOSPITAL OBSERVATION PER HR: HCPCS

## 2024-12-12 PROCEDURE — 82948 REAGENT STRIP/BLOOD GLUCOSE: CPT

## 2024-12-12 PROCEDURE — 25810000003 SODIUM CHLORIDE 0.9 % SOLUTION: Performed by: EMERGENCY MEDICINE

## 2024-12-12 PROCEDURE — 81001 URINALYSIS AUTO W/SCOPE: CPT | Performed by: EMERGENCY MEDICINE

## 2024-12-12 PROCEDURE — 94640 AIRWAY INHALATION TREATMENT: CPT

## 2024-12-12 PROCEDURE — 25510000002 GADOBENATE DIMEGLUMINE 529 MG/ML SOLUTION: Performed by: EMERGENCY MEDICINE

## 2024-12-12 PROCEDURE — 94799 UNLISTED PULMONARY SVC/PX: CPT

## 2024-12-12 PROCEDURE — 85610 PROTHROMBIN TIME: CPT

## 2024-12-12 RX ORDER — IPRATROPIUM BROMIDE AND ALBUTEROL SULFATE 2.5; .5 MG/3ML; MG/3ML
3 SOLUTION RESPIRATORY (INHALATION) EVERY 6 HOURS PRN
Status: DISCONTINUED | OUTPATIENT
Start: 2024-12-12 | End: 2024-12-16 | Stop reason: HOSPADM

## 2024-12-12 RX ORDER — ASPIRIN 300 MG/1
300 SUPPOSITORY RECTAL DAILY
Status: DISCONTINUED | OUTPATIENT
Start: 2024-12-13 | End: 2024-12-16 | Stop reason: HOSPADM

## 2024-12-12 RX ORDER — SODIUM CHLORIDE 9 MG/ML
40 INJECTION, SOLUTION INTRAVENOUS AS NEEDED
Status: DISCONTINUED | OUTPATIENT
Start: 2024-12-12 | End: 2024-12-16 | Stop reason: HOSPADM

## 2024-12-12 RX ORDER — HYDROCHLOROTHIAZIDE 25 MG/1
25 TABLET ORAL DAILY
COMMUNITY

## 2024-12-12 RX ORDER — IBUPROFEN 600 MG/1
1 TABLET ORAL
Status: DISCONTINUED | OUTPATIENT
Start: 2024-12-12 | End: 2024-12-16 | Stop reason: HOSPADM

## 2024-12-12 RX ORDER — DEXTROSE MONOHYDRATE 25 G/50ML
25 INJECTION, SOLUTION INTRAVENOUS
Status: DISCONTINUED | OUTPATIENT
Start: 2024-12-12 | End: 2024-12-16 | Stop reason: HOSPADM

## 2024-12-12 RX ORDER — IPRATROPIUM BROMIDE AND ALBUTEROL SULFATE 2.5; .5 MG/3ML; MG/3ML
3 SOLUTION RESPIRATORY (INHALATION) ONCE
Status: COMPLETED | OUTPATIENT
Start: 2024-12-12 | End: 2024-12-12

## 2024-12-12 RX ORDER — INSULIN LISPRO 100 [IU]/ML
2-7 INJECTION, SOLUTION INTRAVENOUS; SUBCUTANEOUS
Status: DISCONTINUED | OUTPATIENT
Start: 2024-12-13 | End: 2024-12-16 | Stop reason: HOSPADM

## 2024-12-12 RX ORDER — ARFORMOTEROL TARTRATE 15 UG/2ML
15 SOLUTION RESPIRATORY (INHALATION)
Status: DISCONTINUED | OUTPATIENT
Start: 2024-12-13 | End: 2024-12-16 | Stop reason: HOSPADM

## 2024-12-12 RX ORDER — ATORVASTATIN CALCIUM 40 MG/1
80 TABLET, FILM COATED ORAL NIGHTLY
Status: DISCONTINUED | OUTPATIENT
Start: 2024-12-13 | End: 2024-12-16 | Stop reason: HOSPADM

## 2024-12-12 RX ORDER — SODIUM CHLORIDE 0.9 % (FLUSH) 0.9 %
10 SYRINGE (ML) INJECTION AS NEEDED
Status: DISCONTINUED | OUTPATIENT
Start: 2024-12-12 | End: 2024-12-16 | Stop reason: HOSPADM

## 2024-12-12 RX ORDER — SODIUM CHLORIDE 0.9 % (FLUSH) 0.9 %
10 SYRINGE (ML) INJECTION EVERY 12 HOURS SCHEDULED
Status: DISCONTINUED | OUTPATIENT
Start: 2024-12-13 | End: 2024-12-16 | Stop reason: HOSPADM

## 2024-12-12 RX ORDER — NICOTINE POLACRILEX 4 MG
15 LOZENGE BUCCAL
Status: DISCONTINUED | OUTPATIENT
Start: 2024-12-12 | End: 2024-12-16 | Stop reason: HOSPADM

## 2024-12-12 RX ORDER — TERAZOSIN 10 MG/1
10 CAPSULE ORAL NIGHTLY
COMMUNITY

## 2024-12-12 RX ORDER — PANTOPRAZOLE SODIUM 40 MG/1
40 TABLET, DELAYED RELEASE ORAL DAILY
COMMUNITY

## 2024-12-12 RX ORDER — ASPIRIN 81 MG/1
81 TABLET, CHEWABLE ORAL DAILY
Status: DISCONTINUED | OUTPATIENT
Start: 2024-12-13 | End: 2024-12-16 | Stop reason: HOSPADM

## 2024-12-12 RX ORDER — BUDESONIDE 0.5 MG/2ML
0.5 INHALANT ORAL
Status: DISCONTINUED | OUTPATIENT
Start: 2024-12-13 | End: 2024-12-16 | Stop reason: HOSPADM

## 2024-12-12 RX ADMIN — GADOBENATE DIMEGLUMINE 20 ML: 529 INJECTION, SOLUTION INTRAVENOUS at 21:36

## 2024-12-12 RX ADMIN — IPRATROPIUM BROMIDE AND ALBUTEROL SULFATE 3 ML: .5; 3 SOLUTION RESPIRATORY (INHALATION) at 20:36

## 2024-12-12 RX ADMIN — SODIUM CHLORIDE 1000 ML: 9 INJECTION, SOLUTION INTRAVENOUS at 18:47

## 2024-12-12 NOTE — CONSULTS
[facility]  [facility]      Patient Name:   Toney Neri  YOB: 1935  Identification Number:   MRN - [id number]  Date of Service:   12/12/2024 15:33:36    Diagnosis:        I63.89 - Cerebrovascular accident (CVA) due to other mechanism ([facility])    Impression:       89 yo male with history of AFIB, on Eliquis, who noticed L arm numbness and tingling at 1130 am today. It lasted 5 minutes before resolving spontaneously. He is now back to baseline with no focal motor or sensory findings on exam. NIHSS-0, Head CT neg for bleed    Our recommendations are outlined below.    Recommendations:          Stroke/Telemetry Floor        Neuro Checks        Bedside Swallow Eval        DVT Prophylaxis        IV Fluids, Normal Saline        Head of Bed 30 Degrees        Euglycemia and Avoid Hyperthermia (PRN Acetaminophen)        Antihypertensives PRN if Blood pressure is greater than 220/120 or there is a concern for End organ damage/contraindications for permissive HTN. If blood pressure is greater than 220/120 give labetalol PO or IV or Vasotec IV with a goal of 15% reduction in BP during the first 24 hours.        MRI brain        continue Eliquis        ------------------------------------------------------------------------------    Advanced Imaging:  Advanced Imaging Deferred because:    Non-disabling symptoms as verified by the patient; no cortical signs so not consistent with LVO      Metrics:  Last Known Well: 12/12/2024 15:43:05  Dispatch Time: 12/12/2024 15:33:21  Arrival Time: 12/12/2024 15:24:09  Initial Response Time: 12/12/2024 15:34:48  Symptoms: L ARM NUMBNESS, TINGLING x 5 mins.  Initial patient interaction: 12/12/2024 15:44:56  NIHSS Assessment Completed: 12/12/2024 15:44:37  Patient is not a candidate for Thrombolytic.  Thrombolytic Medical Decision: 12/12/2024 15:44:37  Patient was not deemed candidate for Thrombolytic because of following  reasons:  Resolved symptoms .    CT head showed no acute hemorrhage or acute core infarct.    Primary Provider Notified of Diagnostic Impression and Management Plan on: 12/12/2024 15:55:43        ------------------------------------------------------------------------------    History of Present Illness:  Patient is a 89 year old Male.    Patient was brought by EMS for symptoms of L ARM NUMBNESS, TINGLING x 5 mins.  89 yo male with history of atrial fibrillation for which he takes Eliquis, who noticed L arm numbness and tingling at 1130 am today. It lasted 5 minutes before resolving spontaneously. He is now back to baseline with no focal motor or sensory findings on exam.      Past Medical History:       Hypertension       Hyperlipidemia       Atrial Fibrillation       There is no history of Stroke    Medications:    Anticoagulant use:  Yes ELIQUIS  No Antiplatelet use  Reviewed EMR for current medications    Allergies:   Reviewed,NKDA    Social History:  Patient Is: Single  Smoking: No  Alcohol Use: No  Drug Use: No    Family History:    There is no family history of premature cerebrovascular disease pertinent to this consultation    ROS :  14 Points Review of Systems was performed and was negative except mentioned in HPI.    Past Surgical History:  There Is No Surgical History Contributory To Today’s Visit        Examination:  BP(98/60), Pulse(75), Blood Glucose(174)  1A: Level of Consciousness - Alert; keenly responsive + 0  1B: Ask Month and Age - Both Questions Right + 0  1C: Blink Eyes & Squeeze Hands - Performs Both Tasks + 0  2: Test Horizontal Extraocular Movements - Normal + 0  3: Test Visual Fields - No Visual Loss + 0  4: Test Facial Palsy (Use Grimace if Obtunded) - Normal symmetry + 0  5A: Test Left Arm Motor Drift - No Drift for 10 Seconds + 0  5B: Test Right Arm Motor Drift - No Drift for 10 Seconds + 0  6A: Test Left Leg Motor Drift - No Drift for 5 Seconds + 0  6B: Test Right Leg Motor Drift - No  Drift for 5 Seconds + 0  7: Test Limb Ataxia (FNF/Heel-Shin) - No Ataxia + 0  8: Test Sensation - Normal; No sensory loss + 0  9: Test Language/Aphasia - Normal; No aphasia + 0  10: Test Dysarthria - Normal + 0  11: Test Extinction/Inattention - No abnormality + 0    NIHSS Score: 0      Pre-Morbid Modified [place] Scale:  1 Points = No significant disability despite symptoms; able to carry out all usual duties and activities    Spoke with : DR. FERREIRA  I reviewed the available imaging via Rapid and initiated discussion with the primary provider    This consult was conducted in real time using interactive audio and video technology. Patient was informed of the technology being used for this visit and agreed to proceed. Patient located in hospital and provider located at home/office setting.      Patient is being evaluated for possible acute neurologic impairment and high probability of imminent or life-threatening deterioration. I spent total of 35 minutes providing care to this patient, including time for face to face visit via telemedicine, review of medical records, imaging studies and discussion of findings with providers, the patient and/or family.      Dr Bailee Sam      [facility]  For Inpatient follow-up with [facility] physician please call [facility] at [phone]. As we are not an outpatient service for any post hospital discharge needs please contact the hospital for assistance.  If you have any questions for the [facility] physicians or need to reconsult for clinical or diagnostic changes please contact us via [facility] at [phone].

## 2024-12-12 NOTE — ED TRIAGE NOTES
Arrived via EMS from home for dizziness, lightheaded.  Sats 90% placed on 2 L.   104/48, gave 100 ml ns 116 /62.  HR irregular afib.    Patient reports he was dizzy and light headed, very unstable, a little tingling in left arm (denies having now).  Denies chest pain.  Denies headache.      Hx afib

## 2024-12-12 NOTE — ED PROVIDER NOTES
"Time: 3:30 PM EST  Date of encounter:  12/12/2024  Independent Historian/Clinical History and Information was obtained by:   Patient    History is limited by: N/A    Chief Complaint: Dizziness      History of Present Illness:  Patient is a 89 y.o. year old male who presents to the emergency department for evaluation of dizziness starting today at approximately 1130.  Patient states he does have a history of vertigo but this is different.  He denies headache, vision changes, speech difficulty, unilateral numbness tingling or focal weakness.  He states at 1 point he did have left upper extremity numbness and states that \"it was mostly my hand\".  This has fully resolved and his only current symptom is lightheadedness.      Patient Care Team  Primary Care Provider: Una Neves APRN    Past Medical History:     No Known Allergies  Past Medical History:   Diagnosis Date    Acid reflux     Arthritis     Atrial fibrillation     CHALLAPPA FOLLOWS PT,    Cancer     Coronary artery disease     NO INTERVENTION    Diabetes mellitus, type 2     METFORMIN    Elevated PSA     Former smoker     INHALERS: TRELEGY, ALBUTEROL, NON SPECIFIC LUNG HX    High cholesterol     Hypertension     Limb swelling     Myocardial infarction     NO INTERVENTION, APPROX 2010    Skin lesion     RECENT SKIN CA REMOVED FROM FACE    Sleep apnea     NO CPAP    Urinary stream slowing      Past Surgical History:   Procedure Laterality Date    ANKLE SURGERY Right 1989    ankle repair    BICEPS TENDON REPAIR Right     COLONOSCOPY  2012    CYSTOSCOPY      CYSTOSCOPY RETROGRADE PYELOGRAM Bilateral 4/26/2022    Procedure: CYSTOSCOPY RETROGRADE PYELOGRAM;  Surgeon: Yoli Le MD;  Location: [facility];  Service: Urology;  Laterality: Bilateral;    CYSTOSCOPY RETROGRADE PYELOGRAM Bilateral 3/21/2023    Procedure: CYSTOSCOPY RETROGRADE PYELOGRAM;  Surgeon: Yoli Le MD;  Location: [facility] OR;  Service: Urology;  Laterality: Bilateral; "    CYSTOSCOPY RETROGRADE PYELOGRAM Bilateral 10/10/2024    Procedure: CYSTOSCOPY RETROGRADE PYELOGRAM;  Surgeon: Yoli Le MD;  Location: [facility] MA[facility] OR;  Service: Urology;  Laterality: Bilateral;    ENDOSCOPY  2018 2020    GALLBLADDER SURGERY      ROTATOR CUFF REPAIR Bilateral 1988 1990    TONSILLECTOMY      TRANSURETHRAL RESECTION OF BLADDER TUMOR N/A 08/19/2021    Procedure: CYSTOSCOPY TRANSURETHRAL RESECTION OF BLADDER TUMOR;  Surgeon: Yoli Le MD;  Location: [facility] OR;  Service: Urology;  Laterality: N/A;    TRANSURETHRAL RESECTION OF BLADDER TUMOR N/A 4/26/2022    Procedure: CYSTOSCOPY TRANSURETHRAL RESECTION OF BLADDER TUMOR;  Surgeon: Yoli Le MD;  Location: [facility] OR;  Service: Urology;  Laterality: N/A;    TRANSURETHRAL RESECTION OF BLADDER TUMOR N/A 11/10/2022    Procedure: CYSTOSCOPY TRANSURETHRAL RESECTION OF BLADDER TUMOR;  Surgeon: Yoli Le MD;  Location: [facility];  Service: Urology;  Laterality: N/A;    TRANSURETHRAL RESECTION OF BLADDER TUMOR N/A 3/21/2023    Procedure: CYSTOSCOPY TRANSURETHRAL RESECTION OF BLADDER TUMOR;  Surgeon: Yoli Le MD;  Location: [facility] OR;  Service: Urology;  Laterality: N/A;     Family History   Problem Relation Age of Onset    Heart disease Mother     Arthritis Mother     Prostate cancer Father     Arthritis Father     Prostate cancer Son     Diabetes Other     Malig Hyperthermia Neg Hx        Home Medications:  Prior to Admission medications    Medication Sig Start Date End Date Taking? Authorizing Provider   acetaminophen (TYLENOL) 325 MG tablet Take 1 tablet by mouth Daily. 4/21/21   Maame Reyes MD   albuterol sulfate  (90 Base) MCG/ACT inhaler Inhale 2 puffs Every 4 (Four) Hours As Needed for Wheezing.    Maame Reyes MD   Albuterol-Budesonide (Airsupra) 90-80 MCG/ACT aerosol Inhale 2 puffs As Needed (for wheezing or shortness of air).    Maame Reyes MD    apixaban (Eliquis) 5 MG tablet tablet Take 1 tablet by mouth 2 (two) times a day. . 1/30/21   Maame Reyes MD   Aspirin Low Dose 81 MG EC tablet Take 1 tablet by mouth Daily. 4/21/21   Maame Reyes MD   atenolol (TENORMIN) 50 MG tablet Take 1 tablet by mouth Every Morning.    Maame Reyes MD   carboxymethylcellulose (REFRESH PLUS) 0.5 % solution 1 drop 4 (Four) Times a Day.    Maaem Reyes MD   finasteride (PROSCAR) 5 MG tablet Take 1 tablet by mouth Daily. 7/26/22   Maame Reyes MD   loratadine (CLARITIN) 10 MG tablet Take 1 tablet by mouth Every Morning. 10/20/21   Maame Reyes MD   Magnesium Oxide 400 (240 Mg) MG tablet Take 1 tablet by mouth Daily. 4/21/21   Maame Reyes MD   meclizine (ANTIVERT) 25 MG tablet Take 1 tablet by mouth 3 (Three) Times a Day As Needed for Dizziness. 4/27/22   Barrett Cortez PA-C   Melatonin 3 MG capsule Take 1 capsule by mouth 2 (Two) Times a Day.    Maame Reyes MD   metFORMIN (GLUCOPHAGE) 1000 MG tablet Take 1 tablet by mouth 2 (Two) Times a Day. 1/30/21   Maame Reyes MD   multivitamins-minerals (PRESERVISION AREDS 2) capsule capsule Take 1 capsule by mouth 2 (Two) Times a Day. 9/16/24   Maame Reyes MD   NIFEdipine CC (ADALAT CC) 90 MG 24 hr tablet Take 1 tablet by mouth Daily. 10/11/24   Maame Reyes MD   potassium chloride 10 MEQ CR tablet Take 1 tablet by mouth Daily. 1/30/21   Maame Reyes MD   simvastatin (ZOCOR) 40 MG tablet Take 1 tablet by mouth Every Night.    Maame Reyes MD   tamsulosin (FLOMAX) 0.4 MG capsule 24 hr capsule Take 1 capsule by mouth Daily. 12/2/24   Yoli Le MD Trelegy Ellipta 200-62.5-25 MCG/ACT inhaler Inhale 1 puff Daily. 10/1/24   Maame Reyes MD   vitamin C (ASCORBIC ACID) 250 MG tablet Take 1 tablet by mouth Daily.    Maame Reyes MD        Social History:   Social History     Tobacco Use     "Smoking status: Former     Types: Cigarettes    Smokeless tobacco: Never    Tobacco comments:     smoked 21-30 years   Vaping Use    Vaping status: Never Used   Substance Use Topics    Alcohol use: Yes     Comment: rarely drinks less than 1 drink per day has been drinking for 31 or more years    Drug use: Never         Review of Systems:  Review of Systems   Constitutional:  Negative for chills and fever.   HENT:  Negative for congestion, rhinorrhea and sore throat.    Eyes:  Negative for photophobia and visual disturbance.   Respiratory:  Negative for apnea, cough, chest tightness and shortness of breath.    Cardiovascular:  Negative for chest pain and palpitations.   Gastrointestinal:  Negative for abdominal pain, diarrhea, nausea and vomiting.   Endocrine: Negative.    Genitourinary:  Negative for difficulty urinating and dysuria.   Musculoskeletal:  Negative for back pain, joint swelling and myalgias.   Skin:  Negative for color change and wound.   Allergic/Immunologic: Negative.    Neurological:  Positive for dizziness, light-headedness and numbness. Negative for seizures, syncope, facial asymmetry, speech difficulty, weakness and headaches.   Psychiatric/Behavioral: Negative.     All other systems reviewed and are negative.       Physical Exam:  /82   Pulse 90   Temp 97.4 °F (36.3 °C) (Oral)   Resp 18   Ht 175.3 cm (69\")   Wt 113 kg (249 lb 9 oz)   SpO2 92%   BMI 36.85 kg/m²     Physical Exam  Vitals and nursing note reviewed.   Constitutional:       General: He is awake.      Appearance: Normal appearance.   HENT:      Head: Normocephalic and atraumatic.      Nose: Nose normal.      Mouth/Throat:      Mouth: Mucous membranes are moist.   Eyes:      Extraocular Movements: Extraocular movements intact.      Pupils: Pupils are equal, round, and reactive to light.   Cardiovascular:      Rate and Rhythm: Normal rate and regular rhythm.      Heart sounds: Normal heart sounds.   Pulmonary:      Effort: " Pulmonary effort is normal. No respiratory distress.      Breath sounds: Normal breath sounds. No wheezing, rhonchi or rales.   Abdominal:      General: Bowel sounds are normal.      Palpations: Abdomen is soft.      Tenderness: There is no abdominal tenderness. There is no guarding or rebound.      Comments: No rigidity   Musculoskeletal:         General: No tenderness. Normal range of motion.      Cervical back: Normal range of motion and neck supple.   Skin:     General: Skin is warm and dry.      Coloration: Skin is not jaundiced.   Neurological:      General: No focal deficit present.      Mental Status: He is alert and oriented to person, place, and time. Mental status is at baseline.      Cranial Nerves: No cranial nerve deficit.      Sensory: No sensory deficit.      Motor: No weakness.   Psychiatric:         Mood and Affect: Mood normal.                    Medical Decision Making:      Comorbidities that affect care:    Vertigo, hypertension, hyperlipidemia, type 2 diabetes, CAD, atrial fibrillation    External Notes reviewed:    Previous Clinic Note: Ophthalmology office visit 11/20/2024 with [facility] eye specialist.  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      The following orders were placed and all results were independently analyzed by me:  Orders Placed This Encounter   Procedures    CT Head Without Contrast Stroke Protocol    XR Chest 1 View    MRI Brain With & Without Contrast    [place] Draw    Comprehensive Metabolic Panel    Protime-INR    aPTT    Urinalysis With Microscopic If Indicated (No Culture) - Urine, Clean Catch    CBC Auto Differential    Urinalysis, Microscopic Only - Urine, Clean Catch    NPO Diet NPO Type: Strict NPO    Initiate Department's Acute Stroke Process (Team D, Code 19, etc)    Perform NIH Stroke Scale    Measure Actual Weight    Head of Bed 30 Degrees or Less    Undress and Gown    Continuous Pulse Oximetry    Vital Signs    Neuro Checks    Notify  Provider for SBP <80 or >200    Notify Provider for SBP >140 (For Hemorrhagic Stroke)    No Hypotonic Fluids    Nursing Dysphagia Screening (Complete Prior to Giving anything PO)    RN to Place Order SLP Consult (IF swallow screen failed) - Eval & Treat Choosing Reason of RN Dysphagia Screen Failed    Hospitalist (on-call MD unless specified)    Oxygen Therapy- Nasal Cannula; Titrate 1-6 LPM Per SpO2; 90 - 95%    POC Glucose Once    POC Glucose Once    ECG 12 Lead ED Triage Standing Order; Acute Stroke (Onset <12 hrs)    Insert Large Bore Peripheral IV - Right AC Preferred    Initiate Observation Status    CBC & Differential    Green Top (Gel)    Lavender Top    Gold Top - SST    Light Blue Top       Medications Given in the Emergency Department:  Medications   sodium chloride 0.9 % flush 10 mL (has no administration in time range)   sodium chloride 0.9 % bolus 1,000 mL (1,000 mL Intravenous New Bag 12/12/24 8098)        ED Course:    ED Course as of 12/12/24 2023   Thu Dec 12, 2024   1754 EKG interpretation: No acute ischemia, rate controlled atrial fibrillation. [RP]   1813 Reevaluation: Patient still dizzy.  No further numbness tingling of the left arm.  Patient did take his Eliquis today. [RP]      ED Course User Index  [RP] Raoul Medina MD       Labs:    Lab Results (last 24 hours)       Procedure Component Value Units Date/Time    POC Glucose Once [[id number]]  (Abnormal) Collected: 12/12/24 1526    Specimen: Blood Updated: 12/12/24 1532     Glucose 174 mg/dL      Comment: Serial Number: [id number]:  [id number]       CBC & Differential [[id number]]  (Abnormal) Collected: 12/12/24 1542    Specimen: Blood Updated: 12/12/24 1549    Narrative:      The following orders were created for panel order CBC & Differential.  Procedure                               Abnormality         Status                     ---------                               -----------         ------                     CBC Auto  Differential[[id number]]        Abnormal            Final result                 Please view results for these tests on the individual orders.    Comprehensive Metabolic Panel [[id number]]  (Abnormal) Collected: 12/12/24 1542    Specimen: Blood Updated: 12/12/24 1613     Glucose 186 mg/dL      BUN 18 mg/dL      Creatinine 1.44 mg/dL      Sodium 136 mmol/L      Potassium 4.3 mmol/L      Comment: Slight hemolysis detected by analyzer. Result may be falsely elevated.        Chloride 101 mmol/L      CO2 22.3 mmol/L      Calcium 9.1 mg/dL      Total Protein 6.9 g/dL      Albumin 3.8 g/dL      ALT (SGPT) 14 U/L      AST (SGOT) 28 U/L      Comment: Slight hemolysis detected by analyzer. Result may be falsely elevated.        Alkaline Phosphatase 59 U/L      Total Bilirubin 0.4 mg/dL      Globulin 3.1 gm/dL      A/G Ratio 1.2 g/dL      BUN/Creatinine Ratio 12.5     Anion Gap 12.7 mmol/L      eGFR 46.4 mL/min/1.73     Narrative:      GFR Categories in Chronic Kidney Disease (CKD)      GFR Category          GFR (mL/min/1.73)    Interpretation  G1                     90 or greater         Normal or high (1)  G2                      60-89                Mild decrease (1)  G3a                   45-59                Mild to moderate decrease  G3b                   30-44                Moderate to severe decrease  G4                    15-29                Severe decrease  G5                    14 or less           Kidney failure          (1)In the absence of evidence of kidney disease, neither GFR category G1 or G2 fulfill the criteria for CKD.    eGFR calculation 2021 CKD-EPI creatinine equation, which does not include race as a factor    Protime-INR [[id number]]  (Abnormal) Collected: 12/12/24 1542    Specimen: Blood Updated: 12/12/24 1556     Protime 17.9 Seconds      INR 1.44    Narrative:      Suggested Therapeutic Ranges For Oral Anticoagulant Therapy:  Level of Therapy                      INR Target Range  Standard Dose                             2.0-3.0  High Dose                                2.5-3.5  Patients not receiving anticoagulant  Therapy Normal Range                     0.86-1.15    aPTT [[id number]]  (Abnormal) Collected: 12/12/24 1542    Specimen: Blood Updated: 12/12/24 1556     PTT 43.6 seconds     CBC Auto Differential [[id number]]  (Abnormal) Collected: 12/12/24 1542    Specimen: Blood Updated: 12/12/24 1549     WBC 11.58 10*3/mm3      RBC 3.92 10*6/mm3      Hemoglobin 12.2 g/dL      Hematocrit 37.7 %      MCV 96.2 fL      MCH 31.1 pg      MCHC 32.4 g/dL      RDW 14.2 %      RDW-SD 49.5 fl      MPV 11.6 fL      Platelets 257 10*3/mm3      Neutrophil % 64.0 %      Lymphocyte % 24.0 %      Monocyte % 8.2 %      Eosinophil % 2.8 %      Basophil % 0.7 %      Immature Grans % 0.3 %      Neutrophils, Absolute 7.40 10*3/mm3      Lymphocytes, Absolute 2.78 10*3/mm3      Monocytes, Absolute 0.95 10*3/mm3      Eosinophils, Absolute 0.33 10*3/mm3      Basophils, Absolute 0.08 10*3/mm3      Immature Grans, Absolute 0.04 10*3/mm3      nRBC 0.0 /100 WBC     Urinalysis With Microscopic If Indicated (No Culture) - Urine, Clean Catch [[id number]]  (Abnormal) Collected: 12/12/24 1849    Specimen: Urine, Clean Catch Updated: 12/12/24 1920     Color, UA Dark Yellow     Appearance, UA Clear     pH, UA 5.5     Specific Gravity, UA 1.021     Glucose, UA Negative     Ketones, UA Trace     Bilirubin, UA Negative     Blood, UA Negative     Protein,  mg/dL (2+)     Leuk Esterase, UA Trace     Nitrite, UA Negative     Urobilinogen, UA 1.0 E.U./dL    Urinalysis, Microscopic Only - Urine, Clean Catch [[id number]] Collected: 12/12/24 1849    Specimen: Urine, Clean Catch Updated: 12/12/24 1920     RBC, UA None Seen /HPF      WBC, UA 0-2 /HPF      Bacteria, UA None Seen /HPF      Squamous Epithelial Cells, UA 0-2 /HPF      Hyaline Casts, UA 7-12 /LPF      Calcium Oxalate Crystals, UA Small/1+ /HPF      Methodology Manual Light Microscopy              Imaging:    XR Chest 1 View    Result Date: 12/12/2024  XR CHEST 1 VW Date of Exam: 12/12/2024 4:09 PM EST Indication: Acute Stroke Protocol (Onset < 12 hrs) Comparison: 10/14/2024 Findings: The lungs appear adequately aerated without consolidation or mass. No pleural effusion or pneumothorax is identified. Cardiac silhouette is enlarged. Pulmonary vasculature is unremarkable. No acute or suspicious osseous lesion is identified.     Impression: 1.Cardiomegaly. No acute radiographic abnormality is identified. Electronically Signed: Juancarlos Lacy MD  12/12/2024 4:16 PM EST  Workstation ID: [id number]    CT Head Without Contrast Stroke Protocol    Result Date: 12/12/2024  CT HEAD WO CONTRAST STROKE PROTOCOL Date of Exam: 12/12/2024 3:35 PM EST Indication: Neuro Deficit, acute, Stroke suspected Neuro deficit, acute, stroke suspected. Comparison:  MRI 10/14/2024 and prior including prior CT head 3/2/2019   Technique: Axial CT images were obtained of the head without contrast administration.  Reconstructed coronal and sagittal images were also obtained. Automated exposure control and iterative construction methods were used. FINDINGS: Brain/Ventricles: There is diffuse atrophy. No suspicious extra-axial fluid collection is noted. No acute intracranial hemorrhage or mass effect noted. Chronic white matter changes compatible with sequela small vessel ischemic disease in this age group noted. Orbits: Calcification noted posteriorly along the periphery of the left globe similar to remote comparison. Thinning of the lenses noted within the globes bilaterally. No definite acute abnormality appreciated. Sinuses:The visualized paranasal sinuses and mastoid air cells demonstrate no acute abnormality. Soft Tissues/Skull: No acute abnormality of the visualized skull or soft tissues.     No acute intracranial abnormality. Electronically Signed: Rafy José MD  12/12/2024 3:44 PM EST  Workstation ID: [id number]        Differential Diagnosis and Discussion:    Dizziness: Based on the patient's history, signs, and symptoms, the diffential diagnosis includes but is not limited to meningitis, stroke, sepsis, subarachnoid hemorrhage, intracranial bleeding, encephalitis, vertigo, electrolyte imbalance, and metabolic disorders.    PROCEDURES:    Labs were drawn in the emergency department and all labs were reviewed and interpreted by me.  X-ray were performed in the emergency department and all X-ray impressions were independently interpreted by me.  An EKG was performed and the EKG was interpreted by me.  CT scan was performed in the emergency department and the CT scan radiology impression was interpreted by me.    ECG 12 Lead ED Triage Standing Order; Acute Stroke (Onset <12 hrs)   Preliminary Result   HEART RATE=79  bpm   RR Oozhkhbi=398  ms   MS Interval=  ms   P Horizontal Axis=  deg   P Front Axis=  deg   QRSD Interval=86  ms   QT Bdkurinr=476  ms   RKyA=876  ms   QRS Axis=-22  deg   T Wave Axis=103  deg   - ABNORMAL ECG -   Atrial fibrillation   Inferior infarct, old   Anterior infarct, old   Nonspecific T abnormalities, lateral leads   Date and Time of Study:2024-12-12 16:22:16          Procedures    MDM                     Patient Care Considerations:    SEPSIS was considered but is NOT present in the emergency department as SIRS criteria is not present.      Consultants/Shared Management Plan:    Hospitalist: I have discussed the case with Dr. Zhang who agrees to accept the patient for admission.  Consultant: I have discussed the case with teleneurology who agrees to consult on the patient.    Social Determinants of Health:    Patient is independent, reliable, and has access to care.       Disposition and Care Coordination:    Admit:   Through independent evaluation of the patient's history, physical, and imperical data, the patient meets criteria for inpatient admission to the hospital.    I have explained the  patient´s condition, diagnoses and treatment plan based on the information available to me at this time. I have answered questions and addressed any concerns. The patient has a good  understanding of the patient´s diagnosis, condition, and treatment plan as can be expected at this point. The vital signs have been stable. The patient´s condition is stable and appropriate for discharge from the emergency department.      The patient will pursue further outpatient evaluation with the primary care physician or other designated or consulting physician as outlined in the discharge instructions. They are agreeable to this plan of care and follow-up instructions have been explained in detail. The patient has received these instructions in written format and has expressed an understanding of the discharge instructions. The patient is aware that any significant change in condition or worsening of symptoms should prompt an immediate return to this or the closest emergency department or call to 911.    Final diagnoses:   Dizziness        ED Disposition       ED Disposition   Decision to Admit    Condition   --    Comment   Level of Care: Telemetry [5]  Diagnosis: Dizziness [[id number]]  Bed Request Comments: stroke bed                 This medical record created using voice recognition software.             Raoul Medina MD  12/12/24 2023

## 2024-12-13 ENCOUNTER — APPOINTMENT (OUTPATIENT)
Dept: CT IMAGING | Facility: HOSPITAL | Age: 89
DRG: 149 | End: 2024-12-13
Payer: MEDICARE

## 2024-12-13 ENCOUNTER — TELEPHONE (OUTPATIENT)
Dept: UROLOGY | Age: 89
End: 2024-12-13
Payer: MEDICARE

## 2024-12-13 LAB
ALBUMIN SERPL-MCNC: 3.6 G/DL (ref 3.5–5.2)
ALBUMIN/GLOB SERPL: 1.3 G/DL
ALP SERPL-CCNC: 53 U/L (ref 39–117)
ALT SERPL W P-5'-P-CCNC: 10 U/L (ref 1–41)
ANION GAP SERPL CALCULATED.3IONS-SCNC: 11.2 MMOL/L (ref 5–15)
AST SERPL-CCNC: 16 U/L (ref 1–40)
BASOPHILS # BLD AUTO: 0.05 10*3/MM3 (ref 0–0.2)
BASOPHILS NFR BLD AUTO: 0.5 % (ref 0–1.5)
BILIRUB SERPL-MCNC: 0.6 MG/DL (ref 0–1.2)
BUN SERPL-MCNC: 17 MG/DL (ref 8–23)
BUN/CREAT SERPL: 14.4 (ref 7–25)
CALCIUM SPEC-SCNC: 9 MG/DL (ref 8.6–10.5)
CHLORIDE SERPL-SCNC: 105 MMOL/L (ref 98–107)
CHOLEST SERPL-MCNC: 103 MG/DL (ref 0–200)
CO2 SERPL-SCNC: 23.8 MMOL/L (ref 22–29)
CREAT SERPL-MCNC: 1.18 MG/DL (ref 0.76–1.27)
DEPRECATED RDW RBC AUTO: 48.2 FL (ref 37–54)
EGFRCR SERPLBLD CKD-EPI 2021: 59 ML/MIN/1.73
EOSINOPHIL # BLD AUTO: 0.25 10*3/MM3 (ref 0–0.4)
EOSINOPHIL NFR BLD AUTO: 2.5 % (ref 0.3–6.2)
ERYTHROCYTE [DISTWIDTH] IN BLOOD BY AUTOMATED COUNT: 13.7 % (ref 12.3–15.4)
GLOBULIN UR ELPH-MCNC: 2.7 GM/DL
GLUCOSE BLDC GLUCOMTR-MCNC: 169 MG/DL (ref 70–99)
GLUCOSE BLDC GLUCOMTR-MCNC: 181 MG/DL (ref 70–99)
GLUCOSE BLDC GLUCOMTR-MCNC: 226 MG/DL (ref 70–99)
GLUCOSE SERPL-MCNC: 138 MG/DL (ref 65–99)
HBA1C MFR BLD: 6.8 % (ref 4.8–5.6)
HCT VFR BLD AUTO: 37.9 % (ref 37.5–51)
HDLC SERPL-MCNC: 44 MG/DL (ref 40–60)
HGB BLD-MCNC: 12 G/DL (ref 13–17.7)
IMM GRANULOCYTES # BLD AUTO: 0.04 10*3/MM3 (ref 0–0.05)
IMM GRANULOCYTES NFR BLD AUTO: 0.4 % (ref 0–0.5)
LDLC SERPL CALC-MCNC: 36 MG/DL (ref 0–100)
LDLC/HDLC SERPL: 0.74 {RATIO}
LYMPHOCYTES # BLD AUTO: 2.4 10*3/MM3 (ref 0.7–3.1)
LYMPHOCYTES NFR BLD AUTO: 23.8 % (ref 19.6–45.3)
MAGNESIUM SERPL-MCNC: 1.5 MG/DL (ref 1.6–2.4)
MCH RBC QN AUTO: 30.5 PG (ref 26.6–33)
MCHC RBC AUTO-ENTMCNC: 31.7 G/DL (ref 31.5–35.7)
MCV RBC AUTO: 96.2 FL (ref 79–97)
MONOCYTES # BLD AUTO: 0.92 10*3/MM3 (ref 0.1–0.9)
MONOCYTES NFR BLD AUTO: 9.1 % (ref 5–12)
NEUTROPHILS NFR BLD AUTO: 6.42 10*3/MM3 (ref 1.7–7)
NEUTROPHILS NFR BLD AUTO: 63.7 % (ref 42.7–76)
NRBC BLD AUTO-RTO: 0 /100 WBC (ref 0–0.2)
PHOSPHATE SERPL-MCNC: 2.8 MG/DL (ref 2.5–4.5)
PLATELET # BLD AUTO: 215 10*3/MM3 (ref 140–450)
PMV BLD AUTO: 10.9 FL (ref 6–12)
POTASSIUM SERPL-SCNC: 3.5 MMOL/L (ref 3.5–5.2)
PROT SERPL-MCNC: 6.3 G/DL (ref 6–8.5)
RBC # BLD AUTO: 3.94 10*6/MM3 (ref 4.14–5.8)
SODIUM SERPL-SCNC: 140 MMOL/L (ref 136–145)
TRIGL SERPL-MCNC: 133 MG/DL (ref 0–150)
VLDLC SERPL-MCNC: 23 MG/DL (ref 5–40)
WBC NRBC COR # BLD AUTO: 10.08 10*3/MM3 (ref 3.4–10.8)

## 2024-12-13 PROCEDURE — 82948 REAGENT STRIP/BLOOD GLUCOSE: CPT

## 2024-12-13 PROCEDURE — 97161 PT EVAL LOW COMPLEX 20 MIN: CPT

## 2024-12-13 PROCEDURE — 99232 SBSQ HOSP IP/OBS MODERATE 35: CPT | Performed by: INTERNAL MEDICINE

## 2024-12-13 PROCEDURE — G0378 HOSPITAL OBSERVATION PER HR: HCPCS

## 2024-12-13 PROCEDURE — 97165 OT EVAL LOW COMPLEX 30 MIN: CPT

## 2024-12-13 PROCEDURE — 84100 ASSAY OF PHOSPHORUS: CPT | Performed by: STUDENT IN AN ORGANIZED HEALTH CARE EDUCATION/TRAINING PROGRAM

## 2024-12-13 PROCEDURE — 80061 LIPID PANEL: CPT | Performed by: STUDENT IN AN ORGANIZED HEALTH CARE EDUCATION/TRAINING PROGRAM

## 2024-12-13 PROCEDURE — 95992 CANALITH REPOSITIONING PROC: CPT

## 2024-12-13 PROCEDURE — 63710000001 INSULIN LISPRO (HUMAN) PER 5 UNITS: Performed by: STUDENT IN AN ORGANIZED HEALTH CARE EDUCATION/TRAINING PROGRAM

## 2024-12-13 PROCEDURE — 85025 COMPLETE CBC W/AUTO DIFF WBC: CPT | Performed by: STUDENT IN AN ORGANIZED HEALTH CARE EDUCATION/TRAINING PROGRAM

## 2024-12-13 PROCEDURE — 94664 DEMO&/EVAL PT USE INHALER: CPT

## 2024-12-13 PROCEDURE — 72125 CT NECK SPINE W/O DYE: CPT

## 2024-12-13 PROCEDURE — 83036 HEMOGLOBIN GLYCOSYLATED A1C: CPT | Performed by: STUDENT IN AN ORGANIZED HEALTH CARE EDUCATION/TRAINING PROGRAM

## 2024-12-13 PROCEDURE — 99221 1ST HOSP IP/OBS SF/LOW 40: CPT | Performed by: PSYCHIATRY & NEUROLOGY

## 2024-12-13 PROCEDURE — 80053 COMPREHEN METABOLIC PANEL: CPT | Performed by: STUDENT IN AN ORGANIZED HEALTH CARE EDUCATION/TRAINING PROGRAM

## 2024-12-13 PROCEDURE — 92610 EVALUATE SWALLOWING FUNCTION: CPT

## 2024-12-13 PROCEDURE — 83735 ASSAY OF MAGNESIUM: CPT | Performed by: STUDENT IN AN ORGANIZED HEALTH CARE EDUCATION/TRAINING PROGRAM

## 2024-12-13 PROCEDURE — 25010000002 MAGNESIUM SULFATE IN D5W 1G/100ML (PREMIX) 1-5 GM/100ML-% SOLUTION: Performed by: PHYSICIAN ASSISTANT

## 2024-12-13 PROCEDURE — 94799 UNLISTED PULMONARY SVC/PX: CPT

## 2024-12-13 RX ORDER — MECLIZINE HYDROCHLORIDE 25 MG/1
12.5 TABLET ORAL 3 TIMES DAILY PRN
Status: DISCONTINUED | OUTPATIENT
Start: 2024-12-13 | End: 2024-12-14

## 2024-12-13 RX ORDER — MAGNESIUM SULFATE 1 G/100ML
1 INJECTION INTRAVENOUS
Status: COMPLETED | OUTPATIENT
Start: 2024-12-13 | End: 2024-12-13

## 2024-12-13 RX ADMIN — BUDESONIDE 0.5 MG: 0.5 INHALANT RESPIRATORY (INHALATION) at 19:34

## 2024-12-13 RX ADMIN — APIXABAN 5 MG: 5 TABLET, FILM COATED ORAL at 08:08

## 2024-12-13 RX ADMIN — BUDESONIDE 0.5 MG: 0.5 INHALANT RESPIRATORY (INHALATION) at 07:53

## 2024-12-13 RX ADMIN — INSULIN LISPRO 2 UNITS: 100 INJECTION, SOLUTION INTRAVENOUS; SUBCUTANEOUS at 20:34

## 2024-12-13 RX ADMIN — ATORVASTATIN CALCIUM 80 MG: 40 TABLET, FILM COATED ORAL at 20:34

## 2024-12-13 RX ADMIN — APIXABAN 5 MG: 5 TABLET, FILM COATED ORAL at 20:34

## 2024-12-13 RX ADMIN — MAGNESIUM SULFATE 1 G: 1 INJECTION INTRAVENOUS at 08:08

## 2024-12-13 RX ADMIN — ATORVASTATIN CALCIUM 80 MG: 40 TABLET, FILM COATED ORAL at 00:01

## 2024-12-13 RX ADMIN — ASPIRIN 81 MG: 81 TABLET, CHEWABLE ORAL at 08:08

## 2024-12-13 RX ADMIN — Medication 10 ML: at 20:35

## 2024-12-13 RX ADMIN — Medication 10 ML: at 08:08

## 2024-12-13 RX ADMIN — APIXABAN 5 MG: 5 TABLET, FILM COATED ORAL at 00:01

## 2024-12-13 RX ADMIN — ARFORMOTEROL TARTRATE 15 MCG: 15 SOLUTION RESPIRATORY (INHALATION) at 07:53

## 2024-12-13 RX ADMIN — ARFORMOTEROL TARTRATE 15 MCG: 15 SOLUTION RESPIRATORY (INHALATION) at 19:34

## 2024-12-13 RX ADMIN — MAGNESIUM SULFATE 1 G: 1 INJECTION INTRAVENOUS at 09:50

## 2024-12-13 RX ADMIN — Medication 10 ML: at 00:01

## 2024-12-13 RX ADMIN — INSULIN LISPRO 2 UNITS: 100 INJECTION, SOLUTION INTRAVENOUS; SUBCUTANEOUS at 16:58

## 2024-12-13 RX ADMIN — INSULIN LISPRO 2 UNITS: 100 INJECTION, SOLUTION INTRAVENOUS; SUBCUTANEOUS at 12:29

## 2024-12-13 NOTE — THERAPY EVALUATION
Acute Care - Speech Language Pathology   Swallow Initial Evaluation  [facility]     Patient Name: Toney Neri  : 1935  MRN: [id number]  Today's Date: 2024               Admit Date: 2024    Visit Dx:     ICD-10-CM ICD-9-CM   1. Dizziness  R42 780.4   2. Difficulty in walking  R26.2 719.7   3. Dysphagia, unspecified type  R13.10 787.20     Patient Active Problem List   Diagnosis    Malignant neoplasm of urinary bladder    Permanent atrial fibrillation    Hypertension, essential    Hyperlipemia, mixed    Allergic rhinitis    Arthritis    Benign prostatic hyperplasia    Type 2 diabetes mellitus    Gastroesophageal reflux disease    Sleep apnea    Umbilical hernia    Urothelial carcinoma of bladder    Generalized weakness    Hypokalemia    Hypomagnesemia    Unsteady gait    Dizziness     Past Medical History:   Diagnosis Date    Acid reflux     Arthritis     Atrial fibrillation     CHALLAPPA FOLLOWS PT,    Cancer     Coronary artery disease     NO INTERVENTION    Diabetes mellitus, type 2     METFORMIN    Elevated PSA     Former smoker     INHALERS: TRELEGY, ALBUTEROL, NON SPECIFIC LUNG HX    High cholesterol     Hypertension     Limb swelling     Myocardial infarction     NO INTERVENTION, APPROX     Skin lesion     RECENT SKIN CA REMOVED FROM FACE    Sleep apnea     NO CPAP    Urinary stream slowing      Past Surgical History:   Procedure Laterality Date    ANKLE SURGERY Right     ankle repair    BICEPS TENDON REPAIR Right     COLONOSCOPY  2012    CYSTOSCOPY      CYSTOSCOPY RETROGRADE PYELOGRAM Bilateral 2022    Procedure: CYSTOSCOPY RETROGRADE PYELOGRAM;  Surgeon: Yoli Le MD;  Location: [facility] OR;  Service: Urology;  Laterality: Bilateral;    CYSTOSCOPY RETROGRADE PYELOGRAM Bilateral 3/21/2023    Procedure: CYSTOSCOPY RETROGRADE PYELOGRAM;  Surgeon: Yoli Le MD;  Location: [facility] OR;  Service: Urology;  Laterality: Bilateral;    CYSTOSCOPY RETROGRADE  PYELOGRAM Bilateral 10/10/2024    Procedure: CYSTOSCOPY RETROGRADE PYELOGRAM;  Surgeon: Yoli Le MD;  Location: [facility] MAIN OR;  Service: Urology;  Laterality: Bilateral;    ENDOSCOPY  2018 2020    GALLBLADDER SURGERY      ROTATOR CUFF REPAIR Bilateral 1988 1990    TONSILLECTOMY      TRANSURETHRAL RESECTION OF BLADDER TUMOR N/A 08/19/2021    Procedure: CYSTOSCOPY TRANSURETHRAL RESECTION OF BLADDER TUMOR;  Surgeon: Yoli Le MD;  Location: [facility] MAIN OR;  Service: Urology;  Laterality: N/A;    TRANSURETHRAL RESECTION OF BLADDER TUMOR N/A 4/26/2022    Procedure: CYSTOSCOPY TRANSURETHRAL RESECTION OF BLADDER TUMOR;  Surgeon: Yoli Le MD;  Location: [facility] MAIN OR;  Service: Urology;  Laterality: N/A;    TRANSURETHRAL RESECTION OF BLADDER TUMOR N/A 11/10/2022    Procedure: CYSTOSCOPY TRANSURETHRAL RESECTION OF BLADDER TUMOR;  Surgeon: Yoli Le MD;  Location: [facility] MAIN OR;  Service: Urology;  Laterality: N/A;    TRANSURETHRAL RESECTION OF BLADDER TUMOR N/A 3/21/2023    Procedure: CYSTOSCOPY TRANSURETHRAL RESECTION OF BLADDER TUMOR;  Surgeon: Yoli Le MD;  Location: [facility] OR;  Service: Urology;  Laterality: N/A;       SLP Recommendation and Plan          Inpatient Speech Pathology Dysphagia Evaluation        PAIN SCALE: Patient did not indicate that he was having any pain.    PRECAUTIONS/CONTRAINDICATIONS: Aspiration precautions. Standard precautions.    SUSPECTED ABUSE/NEGLECT/EXPLOITATION: No    PRIOR FUNCTION: Within functional limits.    PATIENT GOALS/EXPECTATIONS:  To consume least restrictive diet without signs/symptoms of aspiration. To identify type and severity of current deficits and provide appropriate treatment.     HISTORY: Patient is a 89-year-old male with a history of hypertension, hyperlipidemia, diabetes type 2, paroxysmal A-fib on Eliquis, BPH, and COPD.  Patient arrived to the ED for evaluation of intermittent dizziness, left arm numbness and  "tingling.  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  MRI brain reported, \"no acute intracranial pathology.  Chronic small vessel/microangiopathic ischemic changes.\"  Chest x-ray reported, \"cardiomegaly.  No acute radiographic abnormality is identified.\"    CURRENT DIET LEVEL: Level 7 regular solids and level 0 thin liquids.    OBJECTIVE:    TEST ADMINISTERED: Oral mechanisms exam, clinical swallow evaluation, and MoCA    COGNITION/SAFETY AWARENESS:  Patient completed the [place] Cognitive Assessment (MOCA) Blind version 8.1 and received a score of 21/22.  This indicates normal cognition.    BEHAVIORAL OBSERVATIONS: Patient was pleasant and cooperative.  Patient reported that the only symptom he seems to be having is intermittent dizziness.  Patient reported that his dizziness has seemed to improved.  Patient denied any difficulty with swallow, cognition, language, or speech.    ORAL MOTOR EXAM: Within functional limits.  No one-sided weakness identified.    VOICE QUALITY: Normal    REFLEX EXAM: Velopharyngeal function appeared to be within normal limits.    POSTURE: Upright for oral care and when eating or drinking.    FEEDING/SWALLOWING FUNCTION:  Patient participated in clinical swallow evaluation and trialed level 0 thin liquids via cup and straw, level 4 pureed solids, level 6 soft and bite sized solids, and level 7 regular solids.  Patient did not demonstrate any overt signs or symptoms of aspiration.    CLINICAL OBSERVATIONS: Patient appeared to have good hyolaryngeal excursion and timing of the swallow.  Patient denied any globus sensation with any of the consistencies trialed.    DYSPHAGIA CRITERIA: Aspiration risk.  Possible stroke.    FUNCTIONAL ASSESSMENT INSTRUMENT: Patient currently scored a level 7 of 7 on Functional Communication Measures for swallowing indicating a 0% limitation in function.    ASSESSMENT/ PLAN OF " CARE:  Patient does not require follow-up speech services at this time.    RECOMMENDATIONS:   1.   DIET: Level 7 regular solids and level 0 thin liquids.    2.  POSITION: Upright for oral care when eating or drinking.    3.  COMPENSATORY STRATEGIES: Eat at a slow rate and take small bites and sips.    Pt/responsible party agrees with plan of care and has been informed of all alternatives, risks and benefits.                                                                                    EDUCATION  The patient has been educated in the following areas:   Dysphagia (Swallowing Impairment).                Time Calculation:    Time Calculation- SLP       Row Name 12/13/24 0900             Time Calculation- SLP    SLP Start Time 0900  -AW      SLP Stop Time 1000  -AW      SLP Time Calculation (min) 60 min  -AW      SLP Received On 12/13/24  -AW         Untimed Charges    61777-FX Eval Oral Pharyng Swallow Minutes 60  -AW         Total Minutes    Untimed Charges Total Minutes 60  -AW       Total Minutes 60  -AW                User Key  (r) = Recorded By, (t) = Taken By, (c) = Cosigned By      Initials Name Provider Type    AW Nelly Huerta SLP Speech and Language Pathologist                    Therapy Charges for Today       Code Description Service Date Service Provider Modifiers Qty    35352166663 HC ST EVAL ORAL PHARYNG SWALLOW 4 12/13/2024 Nelly Huerta SLP GN 1                 RICK Bagley  12/13/2024

## 2024-12-13 NOTE — THERAPY EVALUATION
Acute Care - Physical Therapy Initial Evaluation   [facility]     Patient Name: Toney Neri  : 1935  MRN: [id number]  Today's Date: 2024      Visit Dx:     ICD-10-CM ICD-9-CM   1. Dizziness  R42 780.4   2. Difficulty in walking  R26.2 719.7   3. Dysphagia, unspecified type  R13.10 787.20     Patient Active Problem List   Diagnosis    Malignant neoplasm of urinary bladder    Permanent atrial fibrillation    Hypertension, essential    Hyperlipemia, mixed    Allergic rhinitis    Arthritis    Benign prostatic hyperplasia    Type 2 diabetes mellitus    Gastroesophageal reflux disease    Sleep apnea    Umbilical hernia    Urothelial carcinoma of bladder    Generalized weakness    Hypokalemia    Hypomagnesemia    Unsteady gait    Dizziness     Past Medical History:   Diagnosis Date    Acid reflux     Arthritis     Atrial fibrillation     CHALLAPPA FOLLOWS PT,    Cancer     Coronary artery disease     NO INTERVENTION    Diabetes mellitus, type 2     METFORMIN    Elevated PSA     Former smoker     INHALERS: TRELEGY, ALBUTEROL, NON SPECIFIC LUNG HX    High cholesterol     Hypertension     Limb swelling     Myocardial infarction     NO INTERVENTION, APPROX     Skin lesion     RECENT SKIN CA REMOVED FROM FACE    Sleep apnea     NO CPAP    Urinary stream slowing      Past Surgical History:   Procedure Laterality Date    ANKLE SURGERY Right     ankle repair    BICEPS TENDON REPAIR Right     COLONOSCOPY      CYSTOSCOPY      CYSTOSCOPY RETROGRADE PYELOGRAM Bilateral 2022    Procedure: CYSTOSCOPY RETROGRADE PYELOGRAM;  Surgeon: Yoli Le MD;  Location: [facility] OR;  Service: Urology;  Laterality: Bilateral;    CYSTOSCOPY RETROGRADE PYELOGRAM Bilateral 3/21/2023    Procedure: CYSTOSCOPY RETROGRADE PYELOGRAM;  Surgeon: Yoli Le MD;  Location: [facility] MAIN OR;  Service: Urology;  Laterality: Bilateral;    CYSTOSCOPY RETROGRADE PYELOGRAM Bilateral 10/10/2024    Procedure: CYSTOSCOPY  RETROGRADE PYELOGRAM;  Surgeon: Yoli Le MD;  Location: [facility] MAIN OR;  Service: Urology;  Laterality: Bilateral;    ENDOSCOPY  2018 2020    GALLBLADDER SURGERY      ROTATOR CUFF REPAIR Bilateral 1988 1990    TONSILLECTOMY      TRANSURETHRAL RESECTION OF BLADDER TUMOR N/A 08/19/2021    Procedure: CYSTOSCOPY TRANSURETHRAL RESECTION OF BLADDER TUMOR;  Surgeon: Yoli Le MD;  Location: [facility] MAIN OR;  Service: Urology;  Laterality: N/A;    TRANSURETHRAL RESECTION OF BLADDER TUMOR N/A 4/26/2022    Procedure: CYSTOSCOPY TRANSURETHRAL RESECTION OF BLADDER TUMOR;  Surgeon: Yoli Le MD;  Location: [facility] MAIN OR;  Service: Urology;  Laterality: N/A;    TRANSURETHRAL RESECTION OF BLADDER TUMOR N/A 11/10/2022    Procedure: CYSTOSCOPY TRANSURETHRAL RESECTION OF BLADDER TUMOR;  Surgeon: Yoli Le MD;  Location: [facility] MAIN OR;  Service: Urology;  Laterality: N/A;    TRANSURETHRAL RESECTION OF BLADDER TUMOR N/A 3/21/2023    Procedure: CYSTOSCOPY TRANSURETHRAL RESECTION OF BLADDER TUMOR;  Surgeon: Yoli Le MD;  Location: [facility] MAIN OR;  Service: Urology;  Laterality: N/A;     PT Assessment (Last 12 Hours)       PT Evaluation and Treatment       Row Name 12/13/24 1400 12/13/24 0900       Physical Therapy Time and Intention    Subjective Information complains of;dizziness  -MOO no complaints  -MOO    Document Type -- evaluation  -MOO    Mode of Treatment -- individual therapy;physical therapy  -MOO    Patient Effort good  -MOO good  -MOO      Row Name 12/13/24 0900          General Information    Patient Observations alert;cooperative;agree to therapy  -MOO     Prior Level of Function independent:;all household mobility;community mobility  -MOO     Equipment Currently Used at Home none  -MOO     Existing Precautions/Restrictions no known precautions/restrictions  -MOO     Barriers to Rehab none identified  -MOO       Row Name 12/13/24 0900          Living Environment    Current Living  Arrangements home  -[facility] Name 12/13/24 0900          Cognition    Orientation Status (Cognition) oriented x 3  -MOO       Row Name 12/13/24 0900          Range of Motion (ROM)    Range of Motion ROM is [facility]  -MOO       Row Name 12/13/24 0900          Strength (Manual Muscle Testing)    Strength (Manual Muscle Testing) strength is WFL  -MOO       Row Name 12/13/24 0900          Bed Mobility    Bed Mobility bed mobility (all) activities;supine-sit  -MOO     All Activities, [place] (Bed Mobility) independent  -MOO     Supine-Sit [place] (Bed Mobility) independent  -MOO       Row Name 12/13/24 0900          Transfers    Transfers sit-stand transfer  -MOO       Row Name 12/13/24 0900          Sit-Stand Transfer    Sit-Stand [place] (Transfers) independent  -MOO       Row Name 12/13/24 0900          Gait/Stairs (Locomotion)    Gait/Stairs Locomotion gait/ambulation independence  -     [place] Level (Gait) independent  -     Distance in Feet (Gait) 50  no complints of dizziness throughout  -MOO       Row Name 12/13/24 0900          Balance    Balance Assessment standing dynamic balance  -     Dynamic Standing Balance independent  -MOO       Row Name 12/13/24 1400          Vestibular Evaluation/Treatment    Vestibular Evaluation Performed yes  -MOO       Row Name 12/13/24 1400          Vestibular Symptoms/Subjective Complaints    Vestibular Symptoms/Complaints dizziness;nausea;vertigo  -MOO       Row Name 12/13/24 1400          Auditory Screen    Aural Fullness left  -[facility] Name 12/13/24 1400          Positional Testing    Roll Testing, Left negative  -MOO     Roll Testing, Right negative  -MOO     [place] Hallpike, Left positive  -MOO     Nystagmus Duration, Jordon Hallpike Left less than 60 seconds  -MOO     Nystagmus Latency, [place] Hallpike Left absent  -MOO     Jordon Hallpike, Right negative  -MOO       [facility] Name 12/13/24 1400          Vestibular Treatment    Canalith Repositioning Maneuver Epley  -      Canalith Repositioning Attempts 3  -MOO     Canalith Repositioning Results unresolved  -MOO     Comments, Canalith Repositioning Techniques/Results Jordon-Hallpike was performed bilaterally to assess for dizziness.  He was positive on his left ear with rotational nystagmus.  He was treated with the Epley's maneuver 3 times with some success.  His nystagmus and dizziness has improved however is not completely resolved.  Patient was to dizzy and nauseated to attempt a fourth time so we will try again tomorrow morning.  -MOO       Row Name 12/13/24 1400 12/13/24 0900       Plan of Care Review    Plan of Care Reviewed With -- patient  -MOO    Outcome Evaluation Jordon-Hallpike was performed bilaterally to assess for dizziness. He was positive on his left ear with rotational nystagmus. He was treated with the Epley's maneuver 3 times with some success. His nystagmus and dizziness has improved however is not completely resolved. Patient was to dizzy and nauseated to attempt a fourth time so we will try again tomorrow morning.  -MOO Pt did not demonstrate any safety or mobility deficits during the initial evaluation.  Pt is safe to continue ambulating within their room independently until their discharge from the hospital.  Pt will be discharged from PT services at this time.  -MOO      Row Name 12/13/24 0900          Therapy Assessment/Plan (PT)    Criteria for Skilled Interventions Met (PT) no problems identified which require skilled intervention  -MOO     Therapy Frequency (PT) evaluation only  -MOO       Row Name 12/13/24 0900          PT Evaluation Complexity    History, PT Evaluation Complexity no personal factors and/or comorbidities  -MOO     Examination of Body Systems (PT Eval Complexity) total of 4 or more elements  -MOO     Clinical Presentation (PT Evaluation Complexity) stable  -MOO     Clinical Decision Making (PT Evaluation Complexity) low complexity  -MOO     Overall Complexity (PT Evaluation Complexity) low complexity   -MOO       Row Name 12/13/24 0900          Therapy Plan Review/Discharge Plan (PT)    Therapy Plan Review (PT) evaluation/treatment results reviewed;participants voiced agreement with care plan;participants included;patient  -MOO       Row Name 12/13/24 0900          Physical Therapy Goals    Problem Specific Goal Selection (PT) problem specific goal 1, PT  -MOO       Row Name 12/13/24 1400 12/13/24 0900       Problem Specific Goal 1 (PT)    Problem Specific Goal 1 (PT) Pt will report no dizziness with position changes  -MOO Complete PT evaluation  -MOO    Time Frame (Problem Specific Goal 1, PT) --  10 days  -MOO 1 day  -MOO    Progress/Outcome (Problem Specific Goal 1, PT) -- goal met  -MOO              User Key  (r) = Recorded By, (t) = Taken By, (c) = Cosigned By      Initials Name Provider Type    Andreas Howe, PT Physical Therapist                    Physical Therapy Education        No education to display                  PT Recommendation and Plan  Anticipated Discharge Disposition (PT): home with home health (Pt needs vestibular OP therapy however he does not have transportation.  If possible will need HH vestibular if that is possible.)  Therapy Frequency (PT): evaluation only  Plan of Care Reviewed With: patient  Outcome Evaluation: Jordon-Hallpike was performed bilaterally to assess for dizziness. He was positive on his left ear with rotational nystagmus. He was treated with the Epley's maneuver 3 times with some success. His nystagmus and dizziness has improved however is not completely resolved. Patient was to dizzy and nauseated to attempt a fourth time so we will try again tomorrow morning.   Outcome Measures       Row Name 12/13/24 0900             How much help from another person do you currently need...    Turning from your back to your side while in flat bed without using bedrails? 4  -MOO      Moving from lying on back to sitting on the side of a flat bed without bedrails? 4  -MOO      Moving to and  from a bed to a chair (including a wheelchair)? 4  -MOO      Standing up from a chair using your arms (e.g., wheelchair, bedside chair)? 4  -MOO      Climbing 3-5 steps with a railing? 4  -MOO      To walk in hospital room? 4  -MOO      AM-PAC 6 Clicks Score (PT) 24  -MOO         Functional Assessment    Outcome Measure Options AM-PAC 6 Clicks Basic Mobility (PT)  -MOO                User Key  (r) = Recorded By, (t) = Taken By, (c) = Cosigned By      Initials Name Provider Type    Andreas Howe PT Physical Therapist                     Time Calculation:    PT Charges       Row Name 12/13/24 1408 12/13/24 0935          Time Calculation    PT Received On 12/13/24  -MOO 12/13/24  -MOO     PT Goal Re-Cert Due Date 12/22/24  -MOO --        Untimed Charges    PT Eval/Re-eval Minutes 25  -MOO 30  -MOO     PT Canalith Repositioning 20  -MOO --        Total Minutes    Untimed Charges Total Minutes 45  -MOO 30  -MOO      Total Minutes 45  -MOO 30  -MOO               User Key  (r) = Recorded By, (t) = Taken By, (c) = Cosigned By      Initials Name Provider Type    Andreas Howe, PT Physical Therapist                  Therapy Charges for Today       Code Description Service Date Service Provider Modifiers Qty    24013116279 HC PT EVAL LOW COMPLEXITY 4 12/13/2024 Andreas Cortez, PT GP 1    61567157389 HC PT CANALITH REPOSITIONING PER DAY 12/13/2024 Andreas Cortez, PT GP 1            PT G-Codes  Outcome Measure Options: AM-PAC 6 Clicks Basic Mobility (PT)  AM-PAC 6 Clicks Score (PT): 24    Andreas Cortez, PT  12/13/2024

## 2024-12-13 NOTE — PLAN OF CARE
Goal Outcome Evaluation:  Plan of Care Reviewed With: patient        Progress: no change (Evaluation completed.)  Outcome Evaluation: Patient presents with impaired balance due to feeling dizzy intermittently. He will not benefit from skilled OT services in the inpatient setting. Recommend referral to home health OT for safety assessment and safety training /use of adaptive techniques in the home for fall prevention.    Anticipated Discharge Disposition (OT): home, home with home health

## 2024-12-13 NOTE — PLAN OF CARE
Goal Outcome Evaluation:  Plan of Care Reviewed With: patient           Outcome Evaluation: Pt did not demonstrate any safety or mobility deficits during the initial evaluation.  Pt is safe to continue ambulating within their room independently until their discharge from the hospital.  Pt will be discharged from PT services at this time.    Anticipated Discharge Disposition (PT): home

## 2024-12-13 NOTE — PLAN OF CARE
Goal Outcome Evaluation:  Plan of Care Reviewed With: patient        Progress: no change  Outcome Evaluation: Patient came to floor after c/o dizzines and left arm numbness. NIH: 0. Patient stated legally blind in left eye. No signs or symptoms of distress expressed or observed. Resting in bed with eyes closed and visible respirations.

## 2024-12-13 NOTE — CASE MANAGEMENT/SOCIAL WORK
Discharge Planning Assessment  [facility]     Patient Name: Toney Neri  MRN: [id number]  Today's Date: 12/13/2024    Admit Date: 12/12/2024    Plan: Pt lives home alone. PCP:SONIA Neves Pharm: [facility]/ [facility] in [place]. Pt states he does have good support from family. Pt does not drive due to blindness. Pt's sons and neighbor help with transportation to and from needed Dr. venegass. Pt states he is unable to do OP therapy due to transportation concerns at this time. He is open to [facility] if it will help with his dizziness. Pt plans to return home at discharge. SW will continue to follow for needs.   Discharge Needs Assessment       Row Name 12/13/24 1153       Living Environment    People in Home child(charlee), adult    Current Living Arrangements home    Potentially Unsafe Housing Conditions none    In the past 12 months has the electric, gas, oil, or water company threatened to shut off services in your home? No    Primary Care Provided by self    Provides Primary Care For no one    Family Caregiver if Needed child(charlee), adult    Quality of Family Relationships helpful;involved;supportive    Able to Return to Prior Arrangements yes       Resource/Environmental Concerns    Resource/Environmental Concerns none    Transportation Concerns none       Transportation Needs    In the past 12 months, has lack of transportation kept you from medical appointments or from getting medications? no    In the past 12 months, has lack of transportation kept you from meetings, work, or from getting things needed for daily living? No       Food Insecurity    Within the past 12 months, you worried that your food would run out before you got the money to buy more. Never true    Within the past 12 months, the food you bought just didn't last and you didn't have money to get more. Never true       Transition Planning    Patient/Family Anticipates Transition to home    Patient/Family Anticipated Services at Transition none        Discharge Needs Assessment    Equipment Currently Used at Home cane, straight;walker, rolling;grab bar;glucometer    Concerns to be Addressed discharge planning    Do you want help finding or keeping work or a job? I do not need or want help    Do you want help with school or training? For example, starting or completing job training or getting a high school diploma, GED or equivalent No    Anticipated Changes Related to Illness none    Equipment Needed After Discharge none    Discharge Coordination/Progress Pt lives home alone. PCP:SONIA Neves [facility]: [facility] in [place]. Pt states he does have good support from family. Pt does not drive due to blindness. Pt's sons and neighbor help with transportation to and from needed Dr. king. Pt states he is unable to do OP therapy due to transportation concerns at this time. He is open to [facility] if it will help with his dizziness. Pt plans to return home at discharge. SW will continue to follow for needs.                   Discharge Plan       Row Name 12/13/24 121       Plan    Plan Pt lives home alone. PCP:SONIA Neves Pharm: [facility] in [place]. Pt states he does have good support from family. Pt does not drive due to blindness. Pt's sons and neighbor help with transportation to and from needed Dr. king. Pt states he is unable to do OP therapy due to transportation concerns at this time. He is open to [facility] if it will help with his dizziness. Pt plans to return home at discharge. SW will continue to follow for needs.                  Continued Care and Services - Admitted Since 12/12/2024    No active coordination exists for this encounter.          Demographic Summary       Row Name 12/13/24 5784       General Information    Admission Type observation    Arrived From emergency department    Referral Source admission list    Reason for Consult discharge planning    Preferred Language English       Contact Information    Permission Granted to  Share Info With permission denied                   Functional Status       Row Name 12/13/24 1152       Functional Status    Usual Activity Tolerance good    Current Activity Tolerance good       Physical Activity    On average, how many days per week do you engage in moderate to strenuous exercise (like a brisk walk)? 0 days    On average, how many minutes do you engage in exercise at this level? 0 min    Number of minutes of exercise per week 0       Assessment of Health Literacy    How often do you have someone help you read hospital materials? Never    How often do you have problems learning about your medical condition because of difficulty understanding written information? Never    How often do you have a problem understanding what is told to you about your medical condition? Never    How confident are you filling out medical forms by yourself? Quite a bit    Health Literacy Good       Functional Status, IADL    Medications independent    Meal Preparation independent    Housekeeping independent    Laundry independent    Shopping independent    If for any reason you need help with day-to-day activities such as bathing, preparing meals, shopping, managing finances, etc., do you get the help you need? I get all the help I need       Mental Status    General Appearance WDL WDL       Mental Status Summary    Recent Changes in Mental Status/Cognitive Functioning no changes       Employment/    Employment Status retired                   Psychosocial    No documentation.                  Abuse/Neglect    No documentation.                  Legal       Row Name 12/13/24 1152       Financial Resource Strain    How hard is it for you to pay for the very basics like food, housing, medical care, and heating? Not hard       Financial/Legal    Source of Income pension/residential;social security    Application for Public Assistance not applied       Legal    Criminal Activity/Legal Involvement none                    Substance Abuse    No documentation.                  Patient Forms    No documentation.                     Rowan Zarco

## 2024-12-13 NOTE — THERAPY EVALUATION
Patient Name: Toney Neri  : 1935    MRN: [id number]                              Today's Date: 2024       Admit Date: 2024    Visit Dx:     ICD-10-CM ICD-9-CM   1. Dizziness  R42 780.4   2. Difficulty in walking  R26.2 719.7   3. Dysphagia, unspecified type  R13.10 787.20     Patient Active Problem List   Diagnosis    Malignant neoplasm of urinary bladder    Permanent atrial fibrillation    Hypertension, essential    Hyperlipemia, mixed    Allergic rhinitis    Arthritis    Benign prostatic hyperplasia    Type 2 diabetes mellitus    Gastroesophageal reflux disease    Sleep apnea    Umbilical hernia    Urothelial carcinoma of bladder    Generalized weakness    Hypokalemia    Hypomagnesemia    Unsteady gait    Dizziness     Past Medical History:   Diagnosis Date    Acid reflux     Arthritis     Atrial fibrillation     CH[place]AP[place] FOLLOWS PT,    Cancer     Coronary artery disease     NO INTERVENTION    Diabetes mellitus, type 2     METFORMIN    Elevated PSA     Former smoker     INHALERS: TRELEGY, ALBUTEROL, NON SPECIFIC LUNG HX    High cholesterol     Hypertension     Limb swelling     Myocardial infarction     NO INTERVENTION, APPROX     Skin lesion     RECENT SKIN CA REMOVED FROM FACE    Sleep apnea     NO CPAP    Urinary stream slowing      Past Surgical History:   Procedure Laterality Date    ANKLE SURGERY Right     ankle repair    BICEPS TENDON REPAIR Right     COLONOSCOPY      CYSTOSCOPY      CYSTOSCOPY RETROGRADE PYELOGRAM Bilateral 2022    Procedure: CYSTOSCOPY RETROGRADE PYELOGRAM;  Surgeon: Yoli Le MD;  Location: [facility] OR;  Service: Urology;  Laterality: Bilateral;    CYSTOSCOPY RETROGRADE PYELOGRAM Bilateral 3/21/2023    Procedure: CYSTOSCOPY RETROGRADE PYELOGRAM;  Surgeon: Yoli Le MD;  Location: [facility] OR;  Service: Urology;  Laterality: Bilateral;    CYSTOSCOPY RETROGRADE PYELOGRAM Bilateral 10/10/2024    Procedure: CYSTOSCOPY  RETROGRADE PYELOGRAM;  Surgeon: Yoli Le MD;  Location: [facility] MAIN OR;  Service: Urology;  Laterality: Bilateral;    ENDOSCOPY  2018 2020    GALLBLADDER SURGERY      ROTATOR CUFF REPAIR Bilateral 1988 1990    TONSILLECTOMY      TRANSURETHRAL RESECTION OF BLADDER TUMOR N/A 08/19/2021    Procedure: CYSTOSCOPY TRANSURETHRAL RESECTION OF BLADDER TUMOR;  Surgeon: Yoli Le MD;  Location: [facility] MAIN OR;  Service: Urology;  Laterality: N/A;    TRANSURETHRAL RESECTION OF BLADDER TUMOR N/A 4/26/2022    Procedure: CYSTOSCOPY TRANSURETHRAL RESECTION OF BLADDER TUMOR;  Surgeon: Yoli Le MD;  Location: [facility] MAIN OR;  Service: Urology;  Laterality: N/A;    TRANSURETHRAL RESECTION OF BLADDER TUMOR N/A 11/10/2022    Procedure: CYSTOSCOPY TRANSURETHRAL RESECTION OF BLADDER TUMOR;  Surgeon: Yoli Le MD;  Location: [facility] MAIN OR;  Service: Urology;  Laterality: N/A;    TRANSURETHRAL RESECTION OF BLADDER TUMOR N/A 3/21/2023    Procedure: CYSTOSCOPY TRANSURETHRAL RESECTION OF BLADDER TUMOR;  Surgeon: Yoli Le MD;  Location: [facility] MAIN OR;  Service: Urology;  Laterality: N/A;      General Information       Row Name 12/13/24 1625          OT Time and Intention    Document Type evaluation  -SC     Mode of Treatment individual therapy;occupational therapy  -SC     Patient Effort good  -SC       Row Name 12/13/24 1628          General Information    Prior Level of Function independent:  PLOF is independence in basic ADLs. Pt has a hired  9 hrs per week to assist with IADLs such as cleaning/cooking. He used a cane as needed for household/community mobility. Family provides transportation.  -SC     Existing Precautions/Restrictions fall  -SC     Barriers to Rehab none identified  -SC       Row Name 12/13/24 2192          Occupational Profile    Reason for Services/Referral (Occupational Profile) Patient is a 89 year-old male admitted to [facility] on 12/12/24 after feeling dizzy and  having a short period of left hand numbness.  OT consulted due to a potential decline in function/mobility.  No previous OT services for current condition.  -SC     Performance Patterns (Occupational Profile) Patient is a retired .  -SC     Environmental Supports and Barriers (Occupational Profile) Patient lives independently but has good support from family.  -SC       Row Name 12/13/24 1625          Living Environment    People in Home alone  -SC       Row Name 12/13/24 1625          Home Main Entrance    Number of Stairs, Main Entrance other (see comments)  Patient lives in a tri level home and must climb 13 steps to enter main level of home.  -SC     Stair Railings, Main Entrance railings safe and in good condition  -SC       Row Name 12/13/24 1625          Stairs Within Home, Primary    Number of Stairs, Within Home, Primary other (see comments)  Patient must climb 13 stairs once inside house from main level to upstairs bedroom.  -SC     Stair Railings, Within Home, Primary railings safe and in good condition  -SC       Row Name 12/13/24 1625          Cognition    Orientation Status (Cognition) oriented x 3  -SC       Row Name 12/13/24 1625          Safety Issues/Impairments Affecting Functional Mobility    Impairments Affecting Function (Mobility) balance  -SC               User Key  (r) = Recorded By, (t) = Taken By, (c) = Cosigned By      Initials Name Provider Type    Herlinda Avila OT Occupational Therapist                     Mobility/ADL's       Row Name 12/13/24 1631          Bed Mobility    Bed Mobility bed mobility (all) activities;supine-sit  -SC     All Activities, [place] (Bed Mobility) independent  -SC     Supine-Sit [place] (Bed Mobility) independent  -SC       Row Name 12/13/24 1631          Transfers    Transfers sit-stand transfer  -SC       Row Name 12/13/24 1631          Sit-Stand Transfer    Sit-Stand [place] (Transfers) independent  -SC       Row Name 12/13/24  "1631          Functional Mobility    Functional Mobility- Comment Patient ambulated from bed to bathroom and in room at [facility] with no lob but does report that when the \"dizzy\" spells hit, he feels off balance.  -SC       Row Name 12/13/24 1631          Activities of Daily Living    BADL Assessment/Intervention bathing;upper body dressing;lower body dressing;toileting;grooming  -SC       Row Name 12/13/24 1631          Bathing Assessment/Intervention    [place] Level (Bathing) modified [place]  -SC       Row Name 12/13/24 1631          Upper Body Dressing Assessment/Training    [place] Level (Upper Body Dressing) modified independence  -SC       Row Name 12/13/24 1631          Lower Body Dressing Assessment/Training    [place] Level (Lower Body Dressing) modified independence  -SC       Row Name 12/13/24 1631          Toileting Assessment/Training    [place] Level (Toileting) modified [place]  -SC       Row Name 12/13/24 1631          Grooming Assessment/Training    [place] Level (Grooming) modified [facility]     Comment, (Grooming) standing at sink  -SC               User Key  (r) = Recorded By, (t) = Taken By, (c) = Cosigned By      Initials Name Provider Type    SC Herlinda Trivedi OT Occupational Therapist                   Obj/Interventions       Row Name 12/13/24 1633          Sensory Assessment (Somatosensory)    Sensory Assessment (Somatosensory) sensation intact  -[facility] Name 12/13/24 1633          Vision Assessment/Intervention    Visual Impairment/Limitations corrective lenses full-time  -SC     Vision Assessment Comment 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  -SC       Row Name 12/13/24 1633          Range of Motion Comprehensive    General Range of Motion bilateral upper extremity ROM WNL  -[facility] Name 12/13/24 1633          Strength Comprehensive (MMT)    General Manual Muscle Testing (MMT) Assessment no strength deficits identified  -SC       Row " Name 12/13/24 1633          Motor Skills    Motor Skills functional endurance;coordination  -SC     Coordination WFL  -SC     Functional Endurance fair+  -SC       Row Name 12/13/24 1633          Balance    Dynamic Standing Balance standby assist  -SC               User Key  (r) = Recorded By, (t) = Taken By, (c) = Cosigned By      Initials Name Provider Type    SC Herlinda Trivedi OT Occupational Therapist                   Goals/Plan    No documentation.                  Clinical Impression       Row Name 12/13/24 1633          Pain Assessment    Pretreatment Pain Rating 0/10 - no pain  -SC     Posttreatment Pain Rating 0/10 - no pain  -SC       Row Name 12/13/24 1633          Plan of Care Review    Plan of Care Reviewed With patient  -SC     Progress no change  Evaluation completed.  -SC     Outcome Evaluation Patient presents with impaired balance due to feeling dizzy intermittently. He will not benefit from skilled OT services in the inpatient setting. Recommend referral to home health OT for safety assessment and safety training /use of adaptive techniques in the home for fall prevention.  -SC       Row Name 12/13/24 1633          Therapy Assessment/Plan (OT)    Criteria for Skilled Therapeutic Interventions Met (OT) no problems identified which require skilled intervention  -SC     Therapy Frequency (OT) evaluation only  -SC       Row Name 12/13/24 1633          Therapy Plan Review/Discharge Plan (OT)    Anticipated Discharge Disposition (OT) home;home with home health  -[facility] 12/13/24 1633          Positioning and Restraints    Pre-Treatment Position in bed  -SC     Post Treatment Position bed  -SC     In Bed call light within reach;encouraged to call for assist;exit alarm on  -SC               User Key  (r) = Recorded By, (t) = Taken By, (c) = Cosigned By      Initials Name Provider Type    Herlinda Avila OT Occupational Therapist                   Outcome Measures       [facility] Name 12/13/24 7643           How much help from another is currently needed...    Putting on and taking off regular lower body clothing? 4  -SC     Bathing (including washing, rinsing, and drying) 4  -SC     Toileting (which includes using toilet bed pan or urinal) 4  -SC     Putting on and taking off regular upper body clothing 4  -SC     Taking care of personal grooming (such as brushing teeth) 4  -SC     Eating meals 4  -SC     AM-PAC 6 Clicks Score (OT) 24  -SC       Row Name 12/13/24 0900 12/13/24 0726       How much help from another person do you currently need...    Turning from your back to your side while in flat bed without using bedrails? 4  -MOO 4  -JR    Moving from lying on back to sitting on the side of a flat bed without bedrails? 4  -MOO 4  -JR    Moving to and from a bed to a chair (including a wheelchair)? 4  -MOO 4  -JR    Standing up from a chair using your arms (e.g., wheelchair, bedside chair)? 4  -MOO 4  -JR    Climbing 3-5 steps with a railing? 4  -OMO 3  -JR    To walk in hospital room? 4  -MOO 3  -JR    AM-PAC 6 Clicks Score (PT) 24  -MOO 22  -JR    Highest Level of Mobility Goal 8 --> Walked 250 feet or more  -MOO 7 --> Walk 25 feet or more  -      Row Name 12/13/24 1637 12/13/24 0900       Functional Assessment    Outcome Measure Options AM-PAC 6 Clicks Daily Activity (OT);Optimal Instrument  -SC AM-PAC 6 Clicks Basic Mobility (PT)  -MOO      Row Name 12/13/24 1637          Optimal Instrument    Optimal Instrument Optimal - 3  -SC     Bending/Stooping 3  -SC     Standing 1  -SC     Reaching 1  -SC     From the list, choose the 3 activities you would most like to be able to do without any difficulty Reaching;Bending/stooping;Standing  -SC     Total Score Optimal - 3 5  -SC               User Key  (r) = Recorded By, (t) = Taken By, (c) = Cosigned By      Initials Name Provider Type    Radha Franklin, RN Registered Nurse    Andreas Howe, PT Physical Therapist    SC Herlinda Trivedi, OT Occupational Therapist                     Occupational Therapy Education       Title: PT OT SLP Therapies (In Progress)       Topic: Occupational Therapy (Done)       Point: ADL training (Done)       Description:   Instruct learner(s) on proper safety adaptation and remediation techniques during self care or transfers.   Instruct in proper use of assistive devices.                  Learning Progress Summary            Patient Acceptance, E, VU by SC at 12/13/2024 1638                      Point: Home exercise program (Done)       Description:   Instruct learner(s) on appropriate technique for monitoring, assisting and/or progressing therapeutic exercises/activities.                  Learning Progress Summary            Patient Acceptance, E, VU by SC at 12/13/2024 1638                      Point: Precautions (Done)       Description:   Instruct learner(s) on prescribed precautions during self-care and functional transfers.                  Learning Progress Summary            Patient Acceptance, E, VU by SC at 12/13/2024 1638                      Point: Body mechanics (Done)       Description:   Instruct learner(s) on proper positioning and spine alignment during self-care, functional mobility activities and/or exercises.                  Learning Progress Summary            Patient Acceptance, E, VU by SC at 12/13/2024 1638                                      User Key       Initials Effective Dates Name Provider Type Discipline    SC 02/05/24 -  Herlinda Trivedi OT Occupational Therapist OT                  OT Recommendation and Plan  Therapy Frequency (OT): evaluation only  Plan of Care Review  Plan of Care Reviewed With: patient  Progress: no change (Evaluation completed.)  Outcome Evaluation: Patient presents with impaired balance due to feeling dizzy intermittently. He will not benefit from skilled OT services in the inpatient setting. Recommend referral to home health OT for safety assessment and safety training /use of adaptive  techniques in the home for fall prevention.     Time Calculation:   Evaluation Complexity (OT)  Review Occupational Profile/Medical/Therapy History Complexity: expanded/moderate complexity  Assessment, Occupational Performance/Identification of Deficit Complexity: 1-3 performance deficits  Clinical Decision Making Complexity (OT): problem focused assessment/low complexity  Overall Complexity of Evaluation (OT): low complexity     Time Calculation- OT       Row Name 12/13/24 1642             Time Calculation- OT    OT Received On 12/13/24  -SC         Untimed Charges    OT Eval/Re-eval Minutes 35  -SC         Total Minutes    Untimed Charges Total Minutes 35  -SC       Total Minutes 35  -SC                User Key  (r) = Recorded By, (t) = Taken By, (c) = Cosigned By      Initials Name Provider Type    SC Herlinda Trivedi OT Occupational Therapist                  Therapy Charges for Today       Code Description Service Date Service Provider Modifiers Qty    [id number]  OT EVAL LOW COMPLEXITY 3 12/13/2024 Herlinda Trivedi OT GO 1                 Herlinda Trivedi OT  12/13/2024

## 2024-12-13 NOTE — PLAN OF CARE
Goal Outcome Evaluation:  Plan of Care Reviewed With: patient           Outcome Evaluation: [place] was performed bilaterally to assess for dizziness. He was positive on his left ear with rotational nystagmus. He was treated with the Epley's maneuver 3 times with some success. His nystagmus and dizziness has improved however is not completely resolved. Patient was to dizzy and nauseated to attempt a fourth time so we will try again tomorrow morning.    Anticipated Discharge Disposition (PT): home with home health (Pt needs vestibular OP therapy however he does not have transportation.  If possible will need  vestibular if that is possible.)

## 2024-12-13 NOTE — CONSULTS
Primary Care Provider: No ref. provider found     Consult requested by: Admitting team    Reason for Consultation: Neurological evaluation /lightheadedness and left upper extremity numbness/tingling    History taken from: patient chart RN my brief discussions with admitting    Chief complaint: Lightheadedness and left arm numbness       SUBJECTIVE:    History of present illness: Background per H&P: Toney Neri is a 89 y.o. male who was evaluated in room 217 bed 2 at [facility]    Source of information is the patient and the medical records and my discussion with the team    The patient is a very pleasant 89-year-old right-handed white female who presented with some lightheadedness, and he is insistent that this was not vertiginous sensation.  Then he reported some tingling from his elbow down on the lateral aspect up to the thumb    No weakness    He was seen by [facility] and he was not a candidate for tenecteplase or intervention    His blood pressure was low    His stroke workup is otherwise unremarkable    Primary team is planning to look into his cervical spine and based on that we will decide future course of action    No seizures or migraines    He is already on Eliquis and aspirin and simvastatin 40 mg with good lipid profile    Blood pressure initially quite on the lower side, as low as 93/38  Hemoglobin A1c 6.80  LDL 36  Magnesium 1.5  H&H 3.94 and 12.0, platelet count 215      MRI brain essentially unremarkable       [facility]        Patient Name:   Toney Neri  YOB: 1935  Identification Number:   MRN - [id number]  Date of Service:   12/12/2024 15:33:36     Diagnosis:        I63.89 - Cerebrovascular accident (CVA) due to other mechanism ([facility])     Impression:       89 yo male with history of AFIB, on Eliquis, who noticed L arm numbness and tingling at 1130 am today. It lasted 5 minutes before resolving  spontaneously. He is now back to baseline with no focal motor or sensory findings on exam. NIHSS-0, Head CT neg for bleed     Our recommendations are outlined below.     Recommendations:           Stroke/Telemetry Floor        Neuro Checks        Bedside Swallow Eval        DVT Prophylaxis        IV Fluids, Normal Saline        Head of Bed 30 Degrees        Euglycemia and Avoid Hyperthermia (PRN Acetaminophen)        Antihypertensives PRN if Blood pressure is greater than 220/120 or there is a concern for End organ damage/contraindications for permissive HTN. If blood pressure is greater than 220/120 give labetalol PO or IV or Vasotec IV with a goal of 15% reduction in BP during the first 24 hours.        MRI brain        continue Eliquis           ------------------------------------------------------------------------------     Advanced Imaging:  Advanced Imaging Deferred because:     Non-disabling symptoms as verified by the patient; no cortical signs so not consistent with LVO        Metrics:  Last Known Well: 12/12/2024 15:43:05  Dispatch Time: 12/12/2024 15:33:21  Arrival Time: 12/12/2024 15:24:09  Initial Response Time: 12/12/2024 15:34:48  Symptoms: L ARM NUMBNESS, TINGLING x 5 mins.  Initial patient interaction: 12/12/2024 15:44:56  NIHSS Assessment Completed: 12/12/2024 15:44:37  Patient is not a candidate for Thrombolytic.  Thrombolytic Medical Decision: 12/12/2024 15:44:37  Patient was not deemed candidate for Thrombolytic because of following reasons:  Resolved symptoms .     CT head showed no acute hemorrhage or acute core infarct.     Primary Provider Notified of Diagnostic Impression and Management Plan on: 12/12/2024 15:55:43           ------------------------------------------------------------------------------     History of Present Illness:  Patient is a 89 year old Male.     Patient was brought by EMS for symptoms of L ARM NUMBNESS, TINGLING x 5 mins.  89 yo male with history of atrial  fibrillation for which he takes Eliquis, who noticed L arm numbness and tingling at 1130 am today. It lasted 5 minutes before resolving spontaneously. He is now back to baseline with no focal motor or sensory findings on exam.        Past Medical History:       Hypertension       Hyperlipidemia       Atrial Fibrillation       There is no history of Stroke     Medications:     Anticoagulant use:  Yes ELIQUIS  No Antiplatelet use  Reviewed EMR for current medications     Allergies:   Reviewed,NKDA     Social History:  Patient Is: Single  Smoking: No  Alcohol Use: No  Drug Use: No     Family History:     There is no family history of premature cerebrovascular disease pertinent to this consultation     ROS :  14 Points Review of Systems was performed and was negative except mentioned in HPI.     Past Surgical History:  There Is No Surgical History Contributory To Today’s Visit           Examination:  BP(98/60), Pulse(75), Blood Glucose(174)  1A: Level of Consciousness - Alert; keenly responsive + 0  1B: Ask Month and Age - Both Questions Right + 0  1C: Blink Eyes & Squeeze Hands - Performs Both Tasks + 0  2: Test Horizontal Extraocular Movements - Normal + 0  3: Test Visual Fields - No Visual Loss + 0  4: Test Facial Palsy (Use Grimace if Obtunded) - Normal symmetry + 0  5A: Test Left Arm Motor Drift - No Drift for 10 Seconds + 0  5B: Test Right Arm Motor Drift - No Drift for 10 Seconds + 0  6A: Test Left Leg Motor Drift - No Drift for 5 Seconds + 0  6B: Test Right Leg Motor Drift - No Drift for 5 Seconds + 0  7: Test Limb Ataxia (FNF/Heel-Shin) - No Ataxia + 0  8: Test Sensation - Normal; No sensory loss + 0  9: Test Language/Aphasia - Normal; No aphasia + 0  10: Test Dysarthria - Normal + 0  11: Test Extinction/Inattention - No abnormality + 0     NIHSS Score: 0        Pre-Morbid Modified Melly Scale:  1 Points = No significant disability despite symptoms; able to carry out all usual duties and activities     Spoke  with : DR. FERREIRA  I reviewed the available imaging via Rapid and initiated discussion with the primary provider     This consult was conducted in real time using interactive audio and video technology. Patient was informed of the technology being used for this visit and agreed to proceed. Patient located in hospital and provider located at home/office setting.        Patient is being evaluated for possible acute neurologic impairment and high probability of imminent or life-threatening deterioration. I spent total of 35 minutes providing care to this patient, including time for face to face visit via telemedicine, review of medical records, imaging studies and discussion of findings with providers, the patient and/or family.        Dr Bailee Sam      - Portions of the above HPI were copied from previous encounters and edited as appropriate. PMH as detailed below.     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 and has some vision in the lateral aspect of the right eye, facial asymmetry or pain, or neck problem  Chest: No chest pain, clubbing, cyanosis, orthopnea, has history of A-fib   Pulmonary no shortness of air, cough or expectoration  Abdomen:  No swelling/tension, constipation,diarrhea or pain  No genitourinary symptoms  Extremity problems as discussed  No back problem  No psychotic issues  Neurologic issues as discussed  No hematologic, dermatologic or endocrine problems he is diabetic on he is on anticoagulation        PATIENT HISTORY:  Past Medical History:   Diagnosis Date    Acid reflux     Arthritis     Atrial fibrillation     [place] FOLLOWS PT,    Cancer     Coronary artery disease     NO INTERVENTION    Diabetes mellitus, type 2     METFORMIN    Elevated PSA     Former smoker     INHALERS: TRELEGY, ALBUTEROL, NON SPECIFIC LUNG HX    High cholesterol      Hypertension     Limb swelling     Myocardial infarction     NO INTERVENTION, APPROX 2010    Skin lesion     RECENT SKIN CA REMOVED FROM FACE    Sleep apnea     NO CPAP    Urinary stream slowing    ,   Past Surgical History:   Procedure Laterality Date    ANKLE SURGERY Right 1989    ankle repair    BICEPS TENDON REPAIR Right     COLONOSCOPY  2012    CYSTOSCOPY      CYSTOSCOPY RETROGRADE PYELOGRAM Bilateral 4/26/2022    Procedure: CYSTOSCOPY RETROGRADE PYELOGRAM;  Surgeon: Yoli Le MD;  Location: [facility] MAIN OR;  Service: Urology;  Laterality: Bilateral;    CYSTOSCOPY RETROGRADE PYELOGRAM Bilateral 3/21/2023    Procedure: CYSTOSCOPY RETROGRADE PYELOGRAM;  Surgeon: Yoli Le MD;  Location: [facility] MAIN OR;  Service: Urology;  Laterality: Bilateral;    CYSTOSCOPY RETROGRADE PYELOGRAM Bilateral 10/10/2024    Procedure: CYSTOSCOPY RETROGRADE PYELOGRAM;  Surgeon: Yoli Le MD;  Location: [facility] MAIN OR;  Service: Urology;  Laterality: Bilateral;    ENDOSCOPY  2018 2020    GALLBLADDER SURGERY      ROTATOR CUFF REPAIR Bilateral 1988 1990    TONSILLECTOMY      TRANSURETHRAL RESECTION OF BLADDER TUMOR N/A 08/19/2021    Procedure: CYSTOSCOPY TRANSURETHRAL RESECTION OF BLADDER TUMOR;  Surgeon: Yoli Le MD;  Location: [facility] MAIN OR;  Service: Urology;  Laterality: N/A;    TRANSURETHRAL RESECTION OF BLADDER TUMOR N/A 4/26/2022    Procedure: CYSTOSCOPY TRANSURETHRAL RESECTION OF BLADDER TUMOR;  Surgeon: Yoli Le MD;  Location: [facility] OR;  Service: Urology;  Laterality: N/A;    TRANSURETHRAL RESECTION OF BLADDER TUMOR N/A 11/10/2022    Procedure: CYSTOSCOPY TRANSURETHRAL RESECTION OF BLADDER TUMOR;  Surgeon: Yoli Le MD;  Location: [facility] OR;  Service: Urology;  Laterality: N/A;    TRANSURETHRAL RESECTION OF BLADDER TUMOR N/A 3/21/2023    Procedure: CYSTOSCOPY TRANSURETHRAL RESECTION OF BLADDER TUMOR;  Surgeon: Yoli Le MD;  Location: [facility] OR;   Service: Urology;  Laterality: N/A;   ,   Family History   Problem Relation Age of Onset    Heart disease Mother     Arthritis Mother     Prostate cancer Father     Arthritis Father     Prostate cancer Son     Diabetes Other     Malig Hyperthermia Neg Hx    ,   Social History     Tobacco Use    Smoking status: Former     Types: Cigarettes    Smokeless tobacco: Never    Tobacco comments:     smoked 21-30 years   Vaping Use    Vaping status: Never Used   Substance Use Topics    Alcohol use: Yes     Comment: rarely drinks less than 1 drink per day has been drinking for 31 or more years    Drug use: Never   ,   Prior to Admission medications    Medication Sig Start Date End Date Taking? Authorizing Provider   acetaminophen (TYLENOL) 325 MG tablet Take 1 tablet by mouth Daily. 4/21/21   Maame Reyes MD   albuterol sulfate  (90 Base) MCG/ACT inhaler Inhale 2 puffs Every 4 (Four) Hours As Needed for Wheezing.    Maame Reyes MD   Albuterol-Budesonide (Airsupra) 90-80 MCG/ACT aerosol Inhale 2 puffs Every 4 (Four) Hours As Needed (for wheezing or shortness of air).    Maame Reyes MD   apixaban (Eliquis) 5 MG tablet tablet Take 1 tablet by mouth 2 (two) times a day. . 1/30/21   Maame Reyes MD   Aspirin Low Dose 81 MG EC tablet Take 1 tablet by mouth Daily. 4/21/21   Maame Reyes MD   atenolol (TENORMIN) 50 MG tablet Take 1 tablet by mouth Every Morning.    Maame Reyes MD   carboxymethylcellulose (REFRESH PLUS) 0.5 % solution Administer 1 drop to both eyes 4 (Four) Times a Day.    Maame Reyes MD   finasteride (PROSCAR) 5 MG tablet Take 1 tablet by mouth Daily. 7/26/22   Maame Reyes MD   hydroCHLOROthiazide 25 MG tablet Take 1 tablet by mouth Daily.    Maame Reyes MD   loratadine (CLARITIN) 10 MG tablet Take 1 tablet by mouth Every Morning. 10/20/21   Maame Reyes MD   Magnesium Oxide 400 (240 Mg) MG tablet Take 1 tablet by  mouth Daily. 4/21/21   Maame Reyes MD   meclizine (ANTIVERT) 25 MG tablet Take 1 tablet by mouth 3 (Three) Times a Day As Needed for Dizziness.  Patient taking differently: Take 1 tablet by mouth 2 (Two) Times a Day As Needed for Dizziness. 4/27/22   Barrett Cortez PA-C   Melatonin 3 MG capsule Take 1 capsule by mouth 2 (Two) Times a Day.    Maame Reyes MD   metFORMIN (GLUCOPHAGE) 1000 MG tablet Take 1 tablet by mouth 2 (Two) Times a Day. 1/30/21   Maame Reyes MD   multivitamins-minerals (PRESERVISION AREDS 2) capsule capsule Take 1 capsule by mouth 2 (Two) Times a Day. 9/16/24   Maame Reyes MD   NIFEdipine CC (ADALAT CC) 90 MG 24 hr tablet Take 1 tablet by mouth Daily. 10/11/24   Maame Reyes MD   pantoprazole (PROTONIX) 40 MG EC tablet Take 1 tablet by mouth Daily.    Maame Reyes MD   potassium chloride 10 MEQ CR tablet Take 1 tablet by mouth Daily. 1/30/21   Maame Reyes MD   simvastatin (ZOCOR) 40 MG tablet Take 1 tablet by mouth Every Night.    Maame Reyes MD   tamsulosin (FLOMAX) 0.4 MG capsule 24 hr capsule Take 1 capsule by mouth Daily. 12/2/24   Yoli Le MD   terazosin (HYTRIN) 10 MG capsule Take 1 capsule by mouth Every Night.    Maame Reyes MD   Trelerick Ellipta 200-62.5-25 MCG/ACT inhaler Inhale 1 puff Daily. 10/1/24   Maame Reyes MD   vitamin C (ASCORBIC ACID) 250 MG tablet Take 1 tablet by mouth Daily.    Maame Reyes MD    Allergies:  Patient has no known allergies.    Current Facility-Administered Medications   Medication Dose Route Frequency Provider Last Rate Last Admin    apixaban (ELIQUIS) tablet 5 mg  5 mg Oral Q12H Abraham Zhang MD   5 mg at 12/13/24 0808    arformoterol (BROVANA) nebulizer solution 15 mcg  15 mcg Nebulization BID - RT Abraham Zhang MD   15 mcg at 12/13/24 0753    aspirin chewable tablet 81 mg  81 mg Oral Daily Abraham Zhang MD   81 mg at  12/13/24 0808    Or    aspirin suppository 300 mg  300 mg Rectal Daily Abraham Zhang MD        atorvastatin (LIPITOR) tablet 80 mg  80 mg Oral Nightly Abraham Zhang MD   80 mg at 12/13/24 0001    budesonide (PULMICORT) nebulizer solution 0.5 mg  0.5 mg Nebulization BID - RT Abraham Zhang MD   0.5 mg at 12/13/24 0753    dextrose (D50W) (25 g/50 mL) IV injection 25 g  25 g Intravenous Q15 Min PRN Abraham Zhang MD        dextrose (GLUTOSE) oral gel 15 g  15 g Oral Q15 Min PRN Abraham Zhang MD        glucagon (GLUCAGEN) injection 1 mg  1 mg Intramuscular Q15 Min PRN Abraham Zhang MD        Insulin Lispro (humaLOG) injection 2-7 Units  2-7 Units Subcutaneous 4x Daily AC & at Bedtime Abraham Zhang MD        ipratropium-albuterol (DUO-NEB) nebulizer solution 3 mL  3 mL Nebulization Q6H PRN Abraham Zhang MD        sodium chloride 0.9 % flush 10 mL  10 mL Intravenous PRN Raoul Medina MD        sodium chloride 0.9 % flush 10 mL  10 mL Intravenous Q12H Abraham Zhang MD   10 mL at 12/13/24 0808    sodium chloride 0.9 % flush 10 mL  10 mL Intravenous PRN Abraham Zhang MD        sodium chloride 0.9 % infusion 40 mL  40 mL Intravenous TANGELAN Abraham Zhagn MD            ________________________________________________________        OBJECTIVE:  Upon today's exam, the gentleman is resting comfortably in bed in no acute distress      The patient is awake and alert and oriented x3  Normal speech  Cranial nerve examination demonstrate:  Some perception of light on the left side on the right side he can see movement but little bit of vision was preserved where he could identify and see silhouettes in the right temporal upper and lower feels   pupils are round, reactive to light and accommodation and size of about 3 mm  No ptosis or nystagmus  Funduscopic examination was not successful  Eye movements are conjugate     Sensation on the face and scalp are normal  Muscles  of mastication are normal and symmetric  Muscles of  facial expression are normal and symmetric  Hearing is intact bilaterally  Head turning and shoulder shrugs were unremarkable  Tongue was midline  I could not visualize  oropharynx or uvula     Motor examination:  Normal bulk, tone and strength was 5/5  No fasciculations     Sensory examination:  Intact for soft touch, pain   Romberg was not evaluated     Reflexes:  0/4     Coordination:  Normal finger-to-nose to finger, rapid alternating movements and toe to finger     Gait:  Deferred     Toe signs:  Mute      NIH Stroke Scale  Interval: baseline  1a. Level of Consciousness: 0-->Alert, keenly responsive  1b. LOC Questions: 0-->Answers both questions correctly  1c. LOC Commands: 0-->Performs both tasks correctly  2. Best Gaze: 0-->Normal  3. Visual: 0-->No visual loss  4. Facial Palsy: 0-->Normal symmetrical movements  5a. Motor Arm, Left: 0-->No drift, limb holds 90 (or 45) degrees for full 10 secs  5b. Motor Arm, Right: 0-->No drift, limb holds 90 (or 45) degrees for full 10 secs  6a. Motor Leg, Left: 0-->No drift, leg holds 30 degree position for full 5 secs  6b. Motor Leg, Right: 0-->No drift, leg holds 30 degree position for full 5 secs  7. Limb Ataxia: 0-->Absent  8. Sensory: 0-->Normal, no sensory loss  9. Best Language: 0-->No aphasia, normal  10. Dysarthria: 0-->Normal  11. Extinction and Inattention (formerly Neglect): 0-->No abnormality  Total (NIH Stroke Scale): 0         ________________________________________________________   RESULTS REVIEW:    VITAL SIGNS:   Temp:  [97.2 °F (36.2 °C)-97.7 °F (36.5 °C)] 97.3 °F (36.3 °C)  Heart Rate:  [69-96] 96  Resp:  [18] 18  BP: ()/(38-91) 150/91     LABS:      Lab 12/13/24  0436 12/12/24  1542   WBC 10.08 11.58*   HEMOGLOBIN 12.0* 12.2*   HEMATOCRIT 37.9 37.7   PLATELETS 215 257   NEUTROS ABS 6.42 7.40*   IMMATURE GRANS (ABS) 0.04 0.04   LYMPHS ABS 2.40 2.78   MONOS ABS 0.92* 0.95*   EOS ABS 0.25 0.33    MCV 96.2 96.2   PROTIME  --  17.9*   APTT  --  43.6*         Lab 12/13/24  0436 12/12/24  1542   SODIUM 140 136   POTASSIUM 3.5 4.3   CHLORIDE 105 101   CO2 23.8 22.3   ANION GAP 11.2 12.7   BUN 17 18   CREATININE 1.18 1.44*   EGFR 59.0* 46.4*   GLUCOSE 138* 186*   CALCIUM 9.0 9.1   MAGNESIUM 1.5*  --    PHOSPHORUS 2.8  --    HEMOGLOBIN A1C 6.80*  --          Lab 12/13/24  0436 12/12/24  1542   TOTAL PROTEIN 6.3 6.9   ALBUMIN 3.6 3.8   GLOBULIN 2.7 3.1   ALT (SGPT) 10 14   AST (SGOT) 16 28   BILIRUBIN 0.6 0.4   ALK PHOS 53 59         Lab 12/12/24  1542   PROTIME 17.9*   INR 1.44*         Lab 12/13/24  0436   CHOLESTEROL 103   LDL CHOL 36   HDL CHOL 44   TRIGLYCERIDES 133             UA          10/14/2024    20:17 11/4/2024    13:31 12/12/2024    18:49   Urinalysis   Squamous Epithelial Cells, UA 0-2   0-2    Specific Gravity, UA 1.009   1.021    Ketones, UA Negative  Negative  Trace    Blood, UA Moderate (2+)   Negative    Leukocytes, UA Negative  Moderate (2+)  Trace    Nitrite, UA Negative   Negative    RBC, UA 21-50   None Seen    WBC, UA 0-2   0-2    Bacteria, UA None Seen   None Seen        Lab Results   Component Value Date    LDL 36 12/13/2024    HGBA1C 6.80 (H) 12/13/2024    ONQBESRM30 293 07/19/2021       IMAGING STUDIES:  MRI Brain With & Without Contrast    Result Date: 12/12/2024  Impression: No acute intracranial pathology. Chronic small vessel/microangiopathic ischemic changes. Electronically Signed: Toney Garcia MD  12/12/2024 9:40 PM EST  Workstation ID: [id number]    XR Chest 1 View    Result Date: 12/12/2024  Impression: 1.Cardiomegaly. No acute radiographic abnormality is identified. Electronically Signed: Juancarlos Lacy MD  12/12/2024 4:16 PM EST  Workstation ID: [id number]    CT Head Without Contrast Stroke Protocol    Result Date: 12/12/2024  No acute intracranial abnormality. Electronically Signed: Rafy José MD  12/12/2024 3:44 PM EST  Workstation ID: [id number]     I reviewed the  patient's new clinical results.    ________________________________________________________     PROBLEM LIST:    Dizziness            ASSESSMENT/PLAN:  Very less likely this was a stroke  His blood pressure was low  So I suspect his lightheadedness was more hypotension related    As far as strokes or TIAs are concerned, he is maximally treated otherwise and his MRI is negative    The only thing I am considering if he is here to check his CTAs anyway and it can be done electively as outpatient because I do not think is present symptoms are related to stroke or TIA    I agree with Dr. Root that this could be a cervical radiculopathy, C6 questionable    He is going to get MRI of the cervical spine    Otherwise per neurology nothing else to change    Continue his home medication and simvastatin 40 mg as he was taking at home    Electively check his CTAs but nothing else to change otherwise per neurology    Modification of stroke risk factors:   - Blood pressure should be less than 130/80 outpatient, HbA1c less than 6.5, LDL less than 70; b12>500 and smoking cessation if applicable. We would be grateful if the primary team / primary care physician would keep a close watch on the above targets.  - Stroke education  - Follow up with neurologist of choice      I discussed the patient's findings and my recommendations with patient, nursing staff, and primary care team    Cecilio Alejandro MD  12/13/24  11:15 EST

## 2024-12-13 NOTE — PROGRESS NOTES
[facility]   Hospitalist Progress Note       Patient Name: Toney Neri  : 1935  MRN: [id number]  Primary Care Physician: Una Neves APRN  Date of admission: 2024  Today's Date: 2024  Room / Bed:   217/2  Subjective   Chief Complaint: Dizziness, left arm numbness/tingling     HPI:     Toney Neri is a 89 y.o. male with a past medical history of hypertension, hyperlipidemia, type 2 diabetes mellitus, paroxysmal A-fib on Eliquis, BPH, COPD presented to the ED for evaluation of intermittent, dizziness left arm numbness and tingling that started around 1130 hrs. today.  Numbness and tingling was more pronounced in the left hand that has lasted for approximately 5 minutes and has resolved spontaneously.  Since then patient continues to have intermittent episodes of dizziness/lightheadedness.  Not associated with movements of the head.  Denies any ear pain, ear discharge, headache, nausea, vomiting, any other focal weakness, numbness, tingling.  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  Noted to have mild shortness of breath and wheezing during my evaluation.  Has a previous 30-pack-year smoking history, quit many years ago.    Interval Followup: 2024    Alert.  Sitting up on side of bed.  Transient left arm numbness/tingling resolved.  Controlled A-fib on telemetry  MRI brain did not show any acute stroke findings  Patient still reports some lightheadedness      REVIEW OF SYSTEMS:   Lightheadedness  Objective   Temp:  [97.2 °F (36.2 °C)-98.1 °F (36.7 °C)] 98.1 °F (36.7 °C)  Heart Rate:  [69-96] 87  Resp:  [18] 18  BP: ()/(38-91) 158/89  PHYSICAL EXAM   CON: WN. WD. NAD.   NECK:  No thyromegaly. No stridor.   RESP:  CTA. No wheezes. No crackles.    CV:  Rhythm regular. Rate WNL. No murmur noted.  No edema.  GI:  Soft and nontender. Nondistended.    EXT: Peripheral pulses intact.  No  cyanosis.  PSYCH:  Alert. Oriented. Normal affect and mood.  NEURO:  No dysarthria or aphasia.   SKIN: No chronic venous stasis changes or varicosities.  No cellulitis  Results from last 7 days   Lab Units 12/13/24  0436 12/12/24  1542   WBC 10*3/mm3 10.08 11.58*   HEMOGLOBIN g/dL 12.0* 12.2*   HEMATOCRIT % 37.9 37.7   PLATELETS 10*3/mm3 215 257     Results from last 7 days   Lab Units 12/13/24  0436 12/12/24  1542   SODIUM mmol/L 140 136   POTASSIUM mmol/L 3.5 4.3   CO2 mmol/L 23.8 22.3   CHLORIDE mmol/L 105 101   ANION GAP mmol/L 11.2 12.7   BUN mg/dL 17 18   CREATININE mg/dL 1.18 1.44*   GLUCOSE mg/dL 138* 186*     Results from last 7 days   Lab Units 12/12/24  1542   INR  1.44*     COMPLEXITY OF DATA / DECISION MAKING     []  Moderate: One acute illness or mild exacerbation of chronic or 2 stable chronic or tx side effects   []  High:  Severe acute illness or severe exacerbation of chronic - potential for major debility / life threatening         I have personally reviewed the results from the time of this admission to 12/13/2024 13:42 EST:  []  Laboratory:  []  Microbiology: []  Radiology:  []  Telemetry:   []  Cardiology/Vascular:  []  Pathology:  []  Prior external records:  []  Independent historian provided additional details:      []  Discussed case with specialists:    []  Independent interpretation of ECG/Imaging etc:             []  Moderate: Rx management, low risk surgery, suboptimal social situation   []  High:  Rx with close monitoring for toxicity, mod-high risk surgery, DNR decision, Comfort initiated, IV pain meds    Assessment / Plan   Assessment:    Dizziness.  Left arm transient tingling paresthesia  DM  PAF  COPD  BPH  HTN  Dyslipidemia  History of degenerative cervical spine disease  Prior tobacco use  Legally blind/macular degeneration  History of vertigo     Plan:    Stroke/TIA pathway.  Neurology consulted  MRI brain negative for acute stroke  PT for vestibular eval and exercises  Check  ortho BP  Monitored bed  CT cervical spine ordered  Continue Eliquis  Sliding scale insulin protocol  Continue aspirin and statin  Additional recommendations pending clinical course  Discussed plan with RN.  VTE Prophylaxis:  Pharmacologic & mechanical VTE prophylaxis orders are present.      CODE STATUS:      Level Of Support Discussed With: Patient  Code Status (Patient has no pulse and is not breathing): CPR (Attempt to Resuscitate)  Medical Interventions (Patient has pulse or is breathing): Full Support       Electronically signed by ENMANUEL Rdz, 12/13/24, 1:42 PM EST.    Patient independently seen and evaluated, agree with assessment and plan, above documentation reflects plan put forth during bedside rounds.  More than 51% of the time of this patient encounter was performed by me.    Interval history:  No acute events overnight, patient resting comfortably in bed    GEN: No acute distress  HEENT: Moist mucous membranes  LUNGS: Equal chest rise bilaterally  CARDIAC: Regular rate and rhythm  NEURO: Moving all 4 extremities spontaneously  SKIN: No obvious breakdown    Plan:  Agree with assessment and plan as above  PT/OT for BPPV  Continue Eliquis  Continue aspirin and statin  Check imaging of the cervical spine given numbness and tingling  MRI of the brain negative for acute stroke  CBC, CMP reviewed  Repeat CBC, CMP, mag and Phos in a.m.      Electronically signed by Silvestre Root MD, 12/13/2024, 14:38 EST.

## 2024-12-13 NOTE — TELEPHONE ENCOUNTER
Spoke with patient informing him that the urine culture was normal, meaning no growth. Usually if there is not an infection we don't call the patient. I informed patient to call back with any issues or concerns.

## 2024-12-13 NOTE — THERAPY EVALUATION
Acute Care - Physical Therapy Initial Evaluation   Joel     Patient Name: Toney Neri  : 1935  MRN: [id number]  Today's Date: 2024     Admit date: 2024     Referring Physician: Slivestre Root MD     Surgery Date:* No surgery found *             Visit Dx:     ICD-10-CM ICD-9-CM   1. Dizziness  R42 780.4   2. Difficulty in walking  R26.2 719.7     Patient Active Problem List   Diagnosis    Malignant neoplasm of urinary bladder    Permanent atrial fibrillation    Hypertension, essential    Hyperlipemia, mixed    Allergic rhinitis    Arthritis    Benign prostatic hyperplasia    Type 2 diabetes mellitus    Gastroesophageal reflux disease    Sleep apnea    Umbilical hernia    Urothelial carcinoma of bladder    Generalized weakness    Hypokalemia    Hypomagnesemia    Unsteady gait    Dizziness     Past Medical History:   Diagnosis Date    Acid reflux     Arthritis     Atrial fibrillation     CHALLAPPA FOLLOWS PT,    Cancer     Coronary artery disease     NO INTERVENTION    Diabetes mellitus, type 2     METFORMIN    Elevated PSA     Former smoker     INHALERS: TRELEGY, ALBUTEROL, NON SPECIFIC LUNG HX    High cholesterol     Hypertension     Limb swelling     Myocardial infarction     NO INTERVENTION, APPROX     Skin lesion     RECENT SKIN CA REMOVED FROM FACE    Sleep apnea     NO CPAP    Urinary stream slowing      Past Surgical History:   Procedure Laterality Date    ANKLE SURGERY Right     ankle repair    BICEPS TENDON REPAIR Right     COLONOSCOPY      CYSTOSCOPY      CYSTOSCOPY RETROGRADE PYELOGRAM Bilateral 2022    Procedure: CYSTOSCOPY RETROGRADE PYELOGRAM;  Surgeon: Yoli Le MD;  Location: [facility];  Service: Urology;  Laterality: Bilateral;    CYSTOSCOPY RETROGRADE PYELOGRAM Bilateral 3/21/2023    Procedure: CYSTOSCOPY RETROGRADE PYELOGRAM;  Surgeon: Yoli Le MD;  Location: [facility] OR;  Service: Urology;  Laterality: Bilateral;     CYSTOSCOPY RETROGRADE PYELOGRAM Bilateral 10/10/2024    Procedure: CYSTOSCOPY RETROGRADE PYELOGRAM;  Surgeon: Yoli Le MD;  Location: [facility] MAIN OR;  Service: Urology;  Laterality: Bilateral;    ENDOSCOPY  2018 2020    GALLBLADDER SURGERY      ROTATOR CUFF REPAIR Bilateral 1988 1990    TONSILLECTOMY      TRANSURETHRAL RESECTION OF BLADDER TUMOR N/A 08/19/2021    Procedure: CYSTOSCOPY TRANSURETHRAL RESECTION OF BLADDER TUMOR;  Surgeon: Yoli Le MD;  Location: [facility] MAIN OR;  Service: Urology;  Laterality: N/A;    TRANSURETHRAL RESECTION OF BLADDER TUMOR N/A 4/26/2022    Procedure: CYSTOSCOPY TRANSURETHRAL RESECTION OF BLADDER TUMOR;  Surgeon: Yoli Le MD;  Location: [facility] MAIN OR;  Service: Urology;  Laterality: N/A;    TRANSURETHRAL RESECTION OF BLADDER TUMOR N/A 11/10/2022    Procedure: CYSTOSCOPY TRANSURETHRAL RESECTION OF BLADDER TUMOR;  Surgeon: Yoli Le MD;  Location: [facility] MAIN OR;  Service: Urology;  Laterality: N/A;    TRANSURETHRAL RESECTION OF BLADDER TUMOR N/A 3/21/2023    Procedure: CYSTOSCOPY TRANSURETHRAL RESECTION OF BLADDER TUMOR;  Surgeon: Yoli Le MD;  Location: [facility] MAIN OR;  Service: Urology;  Laterality: N/A;     PT Assessment (Last 12 Hours)       PT Evaluation and Treatment       Row Name 12/13/24 0900          Physical Therapy Time and Intention    Subjective Information no complaints  -MOO     Document Type evaluation  -MOO     Mode of Treatment individual therapy;physical therapy  -MOO     Patient Effort good  -MOO       Row Name 12/13/24 0900          General Information    Patient Observations alert;cooperative;agree to therapy  -MOO     Prior Level of Function independent:;all household mobility;community mobility  -MOO     Equipment Currently Used at Home none  -MOO     Existing Precautions/Restrictions no known precautions/restrictions  -MOO     Barriers to Rehab none identified  -MOO       Row Name 12/13/24 0900          Living  Environment    Current Living Arrangements home  -[facility] 12/13/24 0900          Cognition    Orientation Status (Cognition) oriented x 3  -MOO       Row Name 12/13/24 0900          Range of Motion (ROM)    Range of Motion ROM is WFL  -MOO       Row Name 12/13/24 0900          Strength (Manual Muscle Testing)    Strength (Manual Muscle Testing) strength is WFL  -MOO       Row Name 12/13/24 0900          Bed Mobility    Bed Mobility bed mobility (all) activities;supine-sit  -MOO     All Activities, Hardy (Bed Mobility) independent  -MOO     Supine-Sit Hardy (Bed Mobility) independent  -MOO       Row Name 12/13/24 0900          Transfers    Transfers sit-stand transfer  -MOO       Row Name 12/13/24 09          Sit-Stand Transfer    Sit-Stand Hardy (Transfers) independent  -MOO       Row Name 12/13/24 0900          Gait/Stairs (Locomotion)    Gait/Stairs Locomotion gait/ambulation independence  -     Hardy Level (Gait) independent  -     Distance in Feet (Gait) 50  no complints of dizziness throughout  -MOO       Row Name 12/13/24 0900          Balance    Balance Assessment standing dynamic balance  -     Dynamic Standing Balance independent  -MOO       Row Name 12/13/24 0900          Plan of Care Review    Plan of Care Reviewed With patient  -     Outcome Evaluation Pt did not demonstrate any safety or mobility deficits during the initial evaluation.  Pt is safe to continue ambulating within their room independently until their discharge from the hospital.  Pt will be discharged from PT services at this time.  -MOO       Row Name 12/13/24 0900          Therapy Assessment/Plan (PT)    Criteria for Skilled Interventions Met (PT) no problems identified which require skilled intervention  -     Therapy Frequency (PT) evaluation only  -MOO       Row Name 12/13/24 0900          PT Evaluation Complexity    History, PT Evaluation Complexity no personal factors and/or comorbidities  -      Examination of Body Systems (PT Eval Complexity) total of 4 or more elements  -MOO     Clinical Presentation (PT Evaluation Complexity) stable  -MOO     Clinical Decision Making (PT Evaluation Complexity) low complexity  -MOO     Overall Complexity (PT Evaluation Complexity) low complexity  -MOO       Row Name 12/13/24 0900          Therapy Plan Review/Discharge Plan (PT)    Therapy Plan Review (PT) evaluation/treatment results reviewed;participants voiced agreement with care plan;participants included;patient  -MOO       Row Name 12/13/24 0900          Physical Therapy Goals    Problem Specific Goal Selection (PT) problem specific goal 1, PT  -MOO       Row Name 12/13/24 0900          Problem Specific Goal 1 (PT)    Problem Specific Goal 1 (PT) Complete PT evaluation  -MOO     Time Frame (Problem Specific Goal 1, PT) 1 day  -MOO     Progress/Outcome (Problem Specific Goal 1, PT) goal met  -MOO               User Key  (r) = Recorded By, (t) = Taken By, (c) = Cosigned By      Initials Name Provider Type    MOOAndreas Lerner, PT Physical Therapist                    Physical Therapy Education        No education to display                  PT Recommendation and Plan  Anticipated Discharge Disposition (PT): home  Therapy Frequency (PT): evaluation only  Plan of Care Reviewed With: patient  Outcome Evaluation: Pt did not demonstrate any safety or mobility deficits during the initial evaluation.  Pt is safe to continue ambulating within their room independently until their discharge from the hospital.  Pt will be discharged from PT services at this time.   Outcome Measures       Row Name 12/13/24 0900             How much help from another person do you currently need...    Turning from your back to your side while in flat bed without using bedrails? 4  -MOO      Moving from lying on back to sitting on the side of a flat bed without bedrails? 4  -MOO      Moving to and from a bed to a chair (including a wheelchair)? 4  -MOO       Standing up from a chair using your arms (e.g., wheelchair, bedside chair)? 4  -MOO      Climbing 3-5 steps with a railing? 4  -MOO      To walk in hospital room? 4  -MOO      AM-PAC 6 Clicks Score (PT) 24  -MOO         Functional Assessment    Outcome Measure Options AM-PAC 6 Clicks Basic Mobility (PT)  -MOO                User Key  (r) = Recorded By, (t) = Taken By, (c) = Cosigned By      Initials Name Provider Type    Andreas Howe, PT Physical Therapist                     Time Calculation:    PT Charges       Row Name 12/13/24 0935             Time Calculation    PT Received On 12/13/24  -MOO         Untimed Charges    PT Eval/Re-eval Minutes 30  -MOO         Total Minutes    Untimed Charges Total Minutes 30  -MOO       Total Minutes 30  -MOO                User Key  (r) = Recorded By, (t) = Taken By, (c) = Cosigned By      Initials Name Provider Type    Andreas Howe, PT Physical Therapist                      PT G-Codes  Outcome Measure Options: AM-PAC 6 Clicks Basic Mobility (PT)  AM-PAC 6 Clicks Score (PT): 24    Andreas Cortez, PT  12/13/2024

## 2024-12-13 NOTE — TELEPHONE ENCOUNTER
PATIENT CALLED AND SAID HE HAD TO HAVE A URINE TEST THE BEGINNING OF LAST WEEK, AND HE IS CALLING FOR RESULTS.  HE ASKED IF ANYTHING WAS  SENT TO THE PHARMACY AT [facility].    PATIENT SAID HE IS IN [facility], AND DOESN'T KNOW WHEN HE WILL GET OUT.    HE ASKED TO SPEAK WITH TOMMY.    CALL CELL #[phone].

## 2024-12-13 NOTE — H&P
[facility]  Date: 2024   Patient Name: Toney Neri  : 1935  MRN: [id number]  Primary Care Physician:  Una Neves APRN  Date of admission: 2024    Subjective   Subjective     Chief Complaint: Dizziness, left arm numbness/tingling    HPI:    Toney Neri is a 89 y.o. male with a past medical history of hypertension, hyperlipidemia, type 2 diabetes mellitus, paroxysmal A-fib on Eliquis, BPH, COPD presented to the ED for evaluation of intermittent, dizziness left arm numbness and tingling that started around 1130 hrs. today.  Numbness and tingling was more pronounced in the left hand that has lasted for approximately 5 minutes and has resolved spontaneously.  Since then patient continues to have intermittent episodes of dizziness/lightheadedness.  Not associated with movements of the head.  Denies any ear pain, ear discharge, headache, nausea, vomiting, any other focal weakness, numbness, tingling.  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  Noted to have mild shortness of breath and wheezing during my evaluation.  Has a previous 30-pack-year smoking history, quit many years ago.    Upon arrival, vital signs temperature 99.4, pulse 75, respiratory 18, blood pressure 93/38 sitting,98/60 on lying, on room air saturating around 93%.  Labs creatinine 1.44, 2 months ago was 1.1, rest of the CMP with no significant findings, INR 1.44, WBC 11.58, hemoglobin 12.2, platelets 257.  Urinalysis is noninfectious chest x-ray showed no acute abnormalities.  CT head without contrast showed no acute intracranial abnormality.  Received 1 L IV fluids.  Evaluated by Teleneurology, recommended admission for further stroke workup.      Personal History     Past Medical History:   Diagnosis Date    Acid reflux     Arthritis     Atrial fibrillation     [place] FOLLOWS PT,    Cancer      Coronary artery disease     NO INTERVENTION    Diabetes mellitus, type 2     METFORMIN    Elevated PSA     Former smoker     INHALERS: TRELEGY, ALBUTEROL, NON SPECIFIC LUNG HX    High cholesterol     Hypertension     Limb swelling     Myocardial infarction     NO INTERVENTION, APPROX 2010    Skin lesion     RECENT SKIN CA REMOVED FROM FACE    Sleep apnea     NO CPAP    Urinary stream slowing          Past Surgical History:   Procedure Laterality Date    ANKLE SURGERY Right 1989    ankle repair    BICEPS TENDON REPAIR Right     COLONOSCOPY  2012    CYSTOSCOPY      CYSTOSCOPY RETROGRADE PYELOGRAM Bilateral 4/26/2022    Procedure: CYSTOSCOPY RETROGRADE PYELOGRAM;  Surgeon: Yoli Le MD;  Location: [facility] MAIN OR;  Service: Urology;  Laterality: Bilateral;    CYSTOSCOPY RETROGRADE PYELOGRAM Bilateral 3/21/2023    Procedure: CYSTOSCOPY RETROGRADE PYELOGRAM;  Surgeon: Yoli Le MD;  Location: [facility] MAIN OR;  Service: Urology;  Laterality: Bilateral;    CYSTOSCOPY RETROGRADE PYELOGRAM Bilateral 10/10/2024    Procedure: CYSTOSCOPY RETROGRADE PYELOGRAM;  Surgeon: Yoli Le MD;  Location: [facility] MAIN OR;  Service: Urology;  Laterality: Bilateral;    ENDOSCOPY  2018 2020    GALLBLADDER SURGERY      ROTATOR CUFF REPAIR Bilateral 1988 1990    TONSILLECTOMY      TRANSURETHRAL RESECTION OF BLADDER TUMOR N/A 08/19/2021    Procedure: CYSTOSCOPY TRANSURETHRAL RESECTION OF BLADDER TUMOR;  Surgeon: Yoli Le MD;  Location: [facility] MAIN OR;  Service: Urology;  Laterality: N/A;    TRANSURETHRAL RESECTION OF BLADDER TUMOR N/A 4/26/2022    Procedure: CYSTOSCOPY TRANSURETHRAL RESECTION OF BLADDER TUMOR;  Surgeon: Yoli Le MD;  Location: [facility] MAIN OR;  Service: Urology;  Laterality: N/A;    TRANSURETHRAL RESECTION OF BLADDER TUMOR N/A 11/10/2022    Procedure: CYSTOSCOPY TRANSURETHRAL RESECTION OF BLADDER TUMOR;  Surgeon: Yoli Le MD;  Location: [facility] MAIN OR;  Service: Urology;   Laterality: N/A;    TRANSURETHRAL RESECTION OF BLADDER TUMOR N/A 3/21/2023    Procedure: CYSTOSCOPY TRANSURETHRAL RESECTION OF BLADDER TUMOR;  Surgeon: Yoli Le MD;  Location: [facility] MAIN OR;  Service: Urology;  Laterality: N/A;         Family History   Problem Relation Age of Onset    Heart disease Mother     Arthritis Mother     Prostate cancer Father     Arthritis Father     Prostate cancer Son     Diabetes Other     Malig Hyperthermia Neg Hx          Social History     Socioeconomic History    Marital status:    Tobacco Use    Smoking status: Former     Types: Cigarettes    Smokeless tobacco: Never    Tobacco comments:     smoked 21-30 years   Vaping Use    Vaping status: Never Used   Substance and Sexual Activity    Alcohol use: Yes     Comment: rarely drinks less than 1 drink per day has been drinking for 31 or more years    Drug use: Never    Sexual activity: Defer         Home Medications:  Albuterol-Budesonide, Fluticasone-Umeclidin-Vilant, Magnesium Oxide -Mg Supplement, Melatonin, NIFEdipine CC, acetaminophen, albuterol sulfate HFA, apixaban, aspirin, atenolol, carboxymethylcellulose, finasteride, hydroCHLOROthiazide, loratadine, meclizine, metFORMIN, multivitamins-minerals, pantoprazole, potassium chloride, simvastatin, tamsulosin, terazosin, and vitamin C    Allergies:  No Known Allergies    Objective   Objective     Vitals:   Temp:  [97.4 °F (36.3 °C)] 97.4 °F (36.3 °C)  Heart Rate:  [69-90] 84  Resp:  [18] 18  BP: ()/(38-82) 143/82    Physical Exam    Constitutional: Awake, alert, no acute distress   Eyes: Pupils equal, sclerae anicteric, no conjunctival injection   HENT: NCAT, mucous membranes moist   Neck: Supple, no thyromegaly, no lymphadenopathy, trachea midline   Respiratory: Mild diffuse wheezing bilaterally, nonlabored respirations    Cardiovascular: Irregular, normal rate no murmurs   Gastrointestinal: Positive bowel sounds, soft, nontender,  nondistended   Musculoskeletal: 1+ edema bilateral lower extremities, no clubbing or cyanosis to extremities   Neurologic: Oriented x 3, strength 5/5 bilateral upper and lower extremities, sensation to light touch equal and intact bilaterally, cranial nerves III to XII intact,, legal blindness, speech clear    Result Review    Result Review:  I have personally reviewed the results from the time of this admission to 12/12/2024 21:49 EST and agree with these findings:  [x]  Laboratory  []  Microbiology  [x]  Radiology  [x]  EKG/Telemetry   []  Cardiology/Vascular   []  Pathology  []  Old records  []  Other:        Imaging Results (Last 24 Hours)       Procedure Component Value Units Date/Time    MRI Brain With & Without Contrast [[id number]] Collected: 12/12/24 2137     Updated: 12/12/24 2142    Narrative:        MRI BRAIN W WO CONTRAST    Date of Exam: 12/12/2024 9:09 PM EST    Indication: Persistent dizziness, left arm numbness.     Comparison: 12/12/2024    Technique:  Routine multiplanar/multisequence sequence images of the brain were obtained before and after the uneventful administration of 20 cc Multihance.    Findings: No area of restricted diffusion is identified to suggest an acute intracranial infarct. Multifocal areas of T2/FLAIR signal increase are noted within the subcortical, deep cerebral, and periventricular white matter consistent with chronic small   vessel/microangiopathic ischemic changes. The ventricles and sulci are normal in size and configuration. No intracranial mass or mass effect. No extra-axial mass or collection. The posterior fossa is normal. Sellar and suprasellar structures are normal.   The major intracranial flow-voids of the [place] are patent. No abnormal intracranial enhancement. Major intracranial flow voids through the [place] are patent. Intracranial venous sinuses are patent.    Orbital and periorbital soft tissues are normal. The visualized paranasal sinuses  and ethmoid air cells are aerated. The mastoid air cells are aerated..      Impression:      Impression: No acute intracranial pathology. Chronic small vessel/microangiopathic ischemic changes.      Electronically Signed: Toney Garcia MD    12/12/2024 9:40 PM EST    Workstation ID: [id number]    XR Chest 1 View [[id number]] Collected: 12/12/24 1616     Updated: 12/12/24 1618    Narrative:      XR CHEST 1 VW    Date of Exam: 12/12/2024 4:09 PM EST    Indication: Acute Stroke Protocol (Onset < 12 hrs)    Comparison: 10/14/2024    Findings:  The lungs appear adequately aerated without consolidation or mass. No pleural effusion or pneumothorax is identified. Cardiac silhouette is enlarged. Pulmonary vasculature is unremarkable. No acute or suspicious osseous lesion is identified.       Impression:      Impression:  1.Cardiomegaly. No acute radiographic abnormality is identified.      Electronically Signed: Juancarlos Lacy MD    12/12/2024 4:16 PM EST    Workstation ID: [id number]    CT Head Without Contrast Stroke Protocol [[id number]] Collected: 12/12/24 1537     Updated: 12/12/24 1546    Narrative:      CT HEAD WO CONTRAST STROKE PROTOCOL    Date of Exam: 12/12/2024 3:35 PM EST    Indication: Neuro Deficit, acute, Stroke suspected  Neuro deficit, acute, stroke suspected.      Comparison:  MRI 10/14/2024 and prior including prior CT head 3/2/2019        Technique: Axial CT images were obtained of the head without contrast administration.  Reconstructed coronal and sagittal images were also obtained. Automated exposure control and iterative construction methods were used.        FINDINGS:    Brain/Ventricles: There is diffuse atrophy. No suspicious extra-axial fluid collection is noted. No acute intracranial hemorrhage or mass effect noted. Chronic white matter changes compatible with sequela small vessel ischemic disease in this age group   noted.    Orbits: Calcification noted posteriorly along the periphery of the left  globe similar to remote comparison. Thinning of the lenses noted within the globes bilaterally. No definite acute abnormality appreciated.      Sinuses:The visualized paranasal sinuses and mastoid air cells demonstrate no acute abnormality.    Soft Tissues/Skull: No acute abnormality of the visualized skull or soft tissues.      Impression:      No acute intracranial abnormality.      Electronically Signed: Rafy José MD    12/12/2024 3:44 PM EST    Workstation ID: [id number]                   Assessment & Plan   Assessment / Plan     Assessment/Plan:   Left arm numbness and tingling-resolved  Dizziness   Concern for TIA/ischemic stroke  Elevated creatinine  Hypertension  Hyperlipidemia  Type 2 diabetes mellitus  Paroxysmal A-fib on Eliquis  BPH  Sleep apnea  Legal blindness bilaterally except peripheral vision due to Macular degeneration    Plan  Admit to observation, telemetry  Neurochecks per protocol  MRI brain without contrast  Teleneurology follow-up  Permissive hypertension for next 24 hours  Continue Eliquis  Fall precautions  PT OT consult  A1c  Lipid panel  Nursing dysphagia screen  Heart healthy, consistent carb diet  Low-dose sliding scale insulin  Resume other appropriate home medications once reconciled  Brovana, Pulmicort  Bronchodilator protocol  DuoNebs every 6 hours as needed    VTE Prophylaxis:  Mechanical & pharmacologic VTE prophylaxis orders are signed & held.     CODE STATUS:    Level Of Support Discussed With: Patient  Code Status (Patient has no pulse and is not breathing): CPR (Attempt to Resuscitate)  Medical Interventions (Patient has pulse or is breathing): Full Support    Admission Status:  I believe this patient meets observation status.    Part of this note may be an electronic transcription/translation of spoken language to printed text using the Dragon Dictation System    Abraham Zhang MD

## 2024-12-13 NOTE — CONSULTS
Consult received per Stroke Protocol. Patient with a noted DM diagnosis, with a current HbA1c of 6.8%, and an estimated average glucose of 148 mg/dl. Patient's home regimen consists of Metformin 1000 mg BID.

## 2024-12-14 PROBLEM — G45.9 TIA (TRANSIENT ISCHEMIC ATTACK): Status: ACTIVE | Noted: 2024-12-14

## 2024-12-14 LAB
ALBUMIN SERPL-MCNC: 3.5 G/DL (ref 3.5–5.2)
ALBUMIN/GLOB SERPL: 1.3 G/DL
ALP SERPL-CCNC: 54 U/L (ref 39–117)
ALT SERPL W P-5'-P-CCNC: 10 U/L (ref 1–41)
ANION GAP SERPL CALCULATED.3IONS-SCNC: 14.1 MMOL/L (ref 5–15)
AST SERPL-CCNC: 16 U/L (ref 1–40)
BASOPHILS # BLD AUTO: 0.06 10*3/MM3 (ref 0–0.2)
BASOPHILS NFR BLD AUTO: 0.6 % (ref 0–1.5)
BILIRUB SERPL-MCNC: 0.5 MG/DL (ref 0–1.2)
BUN SERPL-MCNC: 16 MG/DL (ref 8–23)
BUN/CREAT SERPL: 14.2 (ref 7–25)
CALCIUM SPEC-SCNC: 8.8 MG/DL (ref 8.6–10.5)
CHLORIDE SERPL-SCNC: 100 MMOL/L (ref 98–107)
CO2 SERPL-SCNC: 25.9 MMOL/L (ref 22–29)
CREAT SERPL-MCNC: 1.13 MG/DL (ref 0.76–1.27)
DEPRECATED RDW RBC AUTO: 47.8 FL (ref 37–54)
EGFRCR SERPLBLD CKD-EPI 2021: 62.1 ML/MIN/1.73
EOSINOPHIL # BLD AUTO: 0.27 10*3/MM3 (ref 0–0.4)
EOSINOPHIL NFR BLD AUTO: 2.5 % (ref 0.3–6.2)
ERYTHROCYTE [DISTWIDTH] IN BLOOD BY AUTOMATED COUNT: 13.5 % (ref 12.3–15.4)
GLOBULIN UR ELPH-MCNC: 2.8 GM/DL
GLUCOSE BLDC GLUCOMTR-MCNC: 151 MG/DL (ref 70–99)
GLUCOSE BLDC GLUCOMTR-MCNC: 170 MG/DL (ref 70–99)
GLUCOSE BLDC GLUCOMTR-MCNC: 187 MG/DL (ref 70–99)
GLUCOSE BLDC GLUCOMTR-MCNC: 198 MG/DL (ref 70–99)
GLUCOSE SERPL-MCNC: 139 MG/DL (ref 65–99)
HCT VFR BLD AUTO: 38.3 % (ref 37.5–51)
HGB BLD-MCNC: 12.1 G/DL (ref 13–17.7)
IMM GRANULOCYTES # BLD AUTO: 0.03 10*3/MM3 (ref 0–0.05)
IMM GRANULOCYTES NFR BLD AUTO: 0.3 % (ref 0–0.5)
LYMPHOCYTES # BLD AUTO: 2.67 10*3/MM3 (ref 0.7–3.1)
LYMPHOCYTES NFR BLD AUTO: 24.9 % (ref 19.6–45.3)
MAGNESIUM SERPL-MCNC: 1.5 MG/DL (ref 1.6–2.4)
MCH RBC QN AUTO: 30.4 PG (ref 26.6–33)
MCHC RBC AUTO-ENTMCNC: 31.6 G/DL (ref 31.5–35.7)
MCV RBC AUTO: 96.2 FL (ref 79–97)
MONOCYTES # BLD AUTO: 1.02 10*3/MM3 (ref 0.1–0.9)
MONOCYTES NFR BLD AUTO: 9.5 % (ref 5–12)
NEUTROPHILS NFR BLD AUTO: 6.66 10*3/MM3 (ref 1.7–7)
NEUTROPHILS NFR BLD AUTO: 62.2 % (ref 42.7–76)
NRBC BLD AUTO-RTO: 0 /100 WBC (ref 0–0.2)
PHOSPHATE SERPL-MCNC: 3.4 MG/DL (ref 2.5–4.5)
PLATELET # BLD AUTO: 233 10*3/MM3 (ref 140–450)
PMV BLD AUTO: 10.8 FL (ref 6–12)
POTASSIUM SERPL-SCNC: 3.6 MMOL/L (ref 3.5–5.2)
PROT SERPL-MCNC: 6.3 G/DL (ref 6–8.5)
RBC # BLD AUTO: 3.98 10*6/MM3 (ref 4.14–5.8)
SODIUM SERPL-SCNC: 140 MMOL/L (ref 136–145)
WBC NRBC COR # BLD AUTO: 10.71 10*3/MM3 (ref 3.4–10.8)

## 2024-12-14 PROCEDURE — 94760 N-INVAS EAR/PLS OXIMETRY 1: CPT

## 2024-12-14 PROCEDURE — 25010000002 MAGNESIUM SULFATE 2 GM/50ML SOLUTION: Performed by: INTERNAL MEDICINE

## 2024-12-14 PROCEDURE — 99232 SBSQ HOSP IP/OBS MODERATE 35: CPT | Performed by: INTERNAL MEDICINE

## 2024-12-14 PROCEDURE — 84100 ASSAY OF PHOSPHORUS: CPT | Performed by: PHYSICIAN ASSISTANT

## 2024-12-14 PROCEDURE — 94664 DEMO&/EVAL PT USE INHALER: CPT

## 2024-12-14 PROCEDURE — 83735 ASSAY OF MAGNESIUM: CPT | Performed by: PHYSICIAN ASSISTANT

## 2024-12-14 PROCEDURE — 82948 REAGENT STRIP/BLOOD GLUCOSE: CPT | Performed by: STUDENT IN AN ORGANIZED HEALTH CARE EDUCATION/TRAINING PROGRAM

## 2024-12-14 PROCEDURE — 94799 UNLISTED PULMONARY SVC/PX: CPT

## 2024-12-14 PROCEDURE — 80053 COMPREHEN METABOLIC PANEL: CPT | Performed by: PHYSICIAN ASSISTANT

## 2024-12-14 PROCEDURE — 85025 COMPLETE CBC W/AUTO DIFF WBC: CPT | Performed by: PHYSICIAN ASSISTANT

## 2024-12-14 PROCEDURE — 63710000001 INSULIN LISPRO (HUMAN) PER 5 UNITS: Performed by: STUDENT IN AN ORGANIZED HEALTH CARE EDUCATION/TRAINING PROGRAM

## 2024-12-14 PROCEDURE — 82948 REAGENT STRIP/BLOOD GLUCOSE: CPT

## 2024-12-14 RX ORDER — MAGNESIUM SULFATE HEPTAHYDRATE 40 MG/ML
2 INJECTION, SOLUTION INTRAVENOUS ONCE
Status: COMPLETED | OUTPATIENT
Start: 2024-12-14 | End: 2024-12-14

## 2024-12-14 RX ORDER — MECLIZINE HYDROCHLORIDE 25 MG/1
12.5 TABLET ORAL 3 TIMES DAILY
Status: DISCONTINUED | OUTPATIENT
Start: 2024-12-14 | End: 2024-12-16 | Stop reason: HOSPADM

## 2024-12-14 RX ORDER — FINASTERIDE 5 MG/1
5 TABLET, FILM COATED ORAL DAILY
Status: DISCONTINUED | OUTPATIENT
Start: 2024-12-14 | End: 2024-12-16 | Stop reason: HOSPADM

## 2024-12-14 RX ORDER — IPRATROPIUM BROMIDE AND ALBUTEROL SULFATE 2.5; .5 MG/3ML; MG/3ML
3 SOLUTION RESPIRATORY (INHALATION) EVERY 6 HOURS PRN
Status: DISCONTINUED | OUTPATIENT
Start: 2024-12-14 | End: 2024-12-14

## 2024-12-14 RX ORDER — ARFORMOTEROL TARTRATE 15 UG/2ML
15 SOLUTION RESPIRATORY (INHALATION)
Status: DISCONTINUED | OUTPATIENT
Start: 2024-12-14 | End: 2024-12-14

## 2024-12-14 RX ORDER — ALBUTEROL SULFATE 0.83 MG/ML
2.5 SOLUTION RESPIRATORY (INHALATION) EVERY 6 HOURS PRN
Status: DISCONTINUED | OUTPATIENT
Start: 2024-12-14 | End: 2024-12-16 | Stop reason: HOSPADM

## 2024-12-14 RX ORDER — PANTOPRAZOLE SODIUM 40 MG/1
40 TABLET, DELAYED RELEASE ORAL
Status: DISCONTINUED | OUTPATIENT
Start: 2024-12-14 | End: 2024-12-16 | Stop reason: HOSPADM

## 2024-12-14 RX ORDER — HYDROCHLOROTHIAZIDE 25 MG/1
25 TABLET ORAL DAILY
Status: DISCONTINUED | OUTPATIENT
Start: 2024-12-14 | End: 2024-12-16 | Stop reason: HOSPADM

## 2024-12-14 RX ORDER — TERAZOSIN 5 MG/1
10 CAPSULE ORAL NIGHTLY
Status: DISCONTINUED | OUTPATIENT
Start: 2024-12-14 | End: 2024-12-16 | Stop reason: HOSPADM

## 2024-12-14 RX ORDER — ATENOLOL 50 MG/1
50 TABLET ORAL
Status: DISCONTINUED | OUTPATIENT
Start: 2024-12-14 | End: 2024-12-16 | Stop reason: HOSPADM

## 2024-12-14 RX ORDER — TAMSULOSIN HYDROCHLORIDE 0.4 MG/1
0.4 CAPSULE ORAL NIGHTLY
Status: DISCONTINUED | OUTPATIENT
Start: 2024-12-14 | End: 2024-12-16 | Stop reason: HOSPADM

## 2024-12-14 RX ORDER — BUDESONIDE 0.5 MG/2ML
0.5 INHALANT ORAL
Status: DISCONTINUED | OUTPATIENT
Start: 2024-12-14 | End: 2024-12-14

## 2024-12-14 RX ORDER — NIFEDIPINE 30 MG/1
30 TABLET, EXTENDED RELEASE ORAL
Status: DISCONTINUED | OUTPATIENT
Start: 2024-12-14 | End: 2024-12-16 | Stop reason: HOSPADM

## 2024-12-14 RX ADMIN — TAMSULOSIN HYDROCHLORIDE 0.4 MG: 0.4 CAPSULE ORAL at 21:08

## 2024-12-14 RX ADMIN — INSULIN LISPRO 2 UNITS: 100 INJECTION, SOLUTION INTRAVENOUS; SUBCUTANEOUS at 08:16

## 2024-12-14 RX ADMIN — ASPIRIN 81 MG: 81 TABLET, CHEWABLE ORAL at 08:16

## 2024-12-14 RX ADMIN — APIXABAN 5 MG: 5 TABLET, FILM COATED ORAL at 08:16

## 2024-12-14 RX ADMIN — ARFORMOTEROL TARTRATE 15 MCG: 15 SOLUTION RESPIRATORY (INHALATION) at 18:56

## 2024-12-14 RX ADMIN — INSULIN LISPRO 2 UNITS: 100 INJECTION, SOLUTION INTRAVENOUS; SUBCUTANEOUS at 21:08

## 2024-12-14 RX ADMIN — FINASTERIDE 5 MG: 5 TABLET, FILM COATED ORAL at 12:25

## 2024-12-14 RX ADMIN — HYDROCHLOROTHIAZIDE 25 MG: 25 TABLET ORAL at 12:25

## 2024-12-14 RX ADMIN — PANTOPRAZOLE SODIUM 40 MG: 40 TABLET, DELAYED RELEASE ORAL at 08:16

## 2024-12-14 RX ADMIN — MAGNESIUM SULFATE HEPTAHYDRATE 2 G: 40 INJECTION, SOLUTION INTRAVENOUS at 09:45

## 2024-12-14 RX ADMIN — INSULIN LISPRO 2 UNITS: 100 INJECTION, SOLUTION INTRAVENOUS; SUBCUTANEOUS at 16:50

## 2024-12-14 RX ADMIN — NIFEDIPINE 30 MG: 30 TABLET, FILM COATED, EXTENDED RELEASE ORAL at 08:16

## 2024-12-14 RX ADMIN — ATORVASTATIN CALCIUM 80 MG: 40 TABLET, FILM COATED ORAL at 21:08

## 2024-12-14 RX ADMIN — MECLIZINE HYDROCHLORIDE 12.5 MG: 25 TABLET ORAL at 13:54

## 2024-12-14 RX ADMIN — ARFORMOTEROL TARTRATE 15 MCG: 15 SOLUTION RESPIRATORY (INHALATION) at 07:21

## 2024-12-14 RX ADMIN — Medication 10 ML: at 21:13

## 2024-12-14 RX ADMIN — INSULIN LISPRO 2 UNITS: 100 INJECTION, SOLUTION INTRAVENOUS; SUBCUTANEOUS at 12:25

## 2024-12-14 RX ADMIN — BUDESONIDE 0.5 MG: 0.5 INHALANT RESPIRATORY (INHALATION) at 07:21

## 2024-12-14 RX ADMIN — TERAZOSIN HYDROCHLORIDE 10 MG: 5 CAPSULE ORAL at 21:08

## 2024-12-14 RX ADMIN — ATENOLOL 50 MG: 50 TABLET ORAL at 08:16

## 2024-12-14 RX ADMIN — Medication 10 ML: at 08:17

## 2024-12-14 RX ADMIN — APIXABAN 5 MG: 5 TABLET, FILM COATED ORAL at 21:08

## 2024-12-14 RX ADMIN — BUDESONIDE 0.5 MG: 0.5 INHALANT RESPIRATORY (INHALATION) at 18:56

## 2024-12-14 RX ADMIN — MECLIZINE HYDROCHLORIDE 12.5 MG: 25 TABLET ORAL at 21:08

## 2024-12-14 NOTE — PLAN OF CARE
Goal Outcome Evaluation:  Plan of Care Reviewed With: patient        Progress: improving  Outcome Evaluation: Patient has slept throughout the night with no complaints or signs of distress. Will continue with plan of care.

## 2024-12-14 NOTE — SIGNIFICANT NOTE
12/14/24 1409   OTHER   Discipline physical therapy assistant   Rehab Time/Intention   Session Not Performed unable to treat, medical status change

## 2024-12-15 LAB
ALBUMIN SERPL-MCNC: 3.4 G/DL (ref 3.5–5.2)
ALBUMIN/GLOB SERPL: 1.2 G/DL
ALP SERPL-CCNC: 56 U/L (ref 39–117)
ALT SERPL W P-5'-P-CCNC: 10 U/L (ref 1–41)
ANION GAP SERPL CALCULATED.3IONS-SCNC: 10.1 MMOL/L (ref 5–15)
AST SERPL-CCNC: 16 U/L (ref 1–40)
BASOPHILS # BLD AUTO: 0.06 10*3/MM3 (ref 0–0.2)
BASOPHILS NFR BLD AUTO: 0.6 % (ref 0–1.5)
BILIRUB SERPL-MCNC: 0.5 MG/DL (ref 0–1.2)
BUN SERPL-MCNC: 23 MG/DL (ref 8–23)
BUN/CREAT SERPL: 18.3 (ref 7–25)
CALCIUM SPEC-SCNC: 9.1 MG/DL (ref 8.6–10.5)
CHLORIDE SERPL-SCNC: 101 MMOL/L (ref 98–107)
CO2 SERPL-SCNC: 26.9 MMOL/L (ref 22–29)
CREAT SERPL-MCNC: 1.26 MG/DL (ref 0.76–1.27)
DEPRECATED RDW RBC AUTO: 47.8 FL (ref 37–54)
EGFRCR SERPLBLD CKD-EPI 2021: 54.5 ML/MIN/1.73
EOSINOPHIL # BLD AUTO: 0.35 10*3/MM3 (ref 0–0.4)
EOSINOPHIL NFR BLD AUTO: 3.3 % (ref 0.3–6.2)
ERYTHROCYTE [DISTWIDTH] IN BLOOD BY AUTOMATED COUNT: 13.5 % (ref 12.3–15.4)
GLOBULIN UR ELPH-MCNC: 2.8 GM/DL
GLUCOSE BLDC GLUCOMTR-MCNC: 166 MG/DL (ref 70–99)
GLUCOSE BLDC GLUCOMTR-MCNC: 171 MG/DL (ref 70–99)
GLUCOSE BLDC GLUCOMTR-MCNC: 237 MG/DL (ref 70–99)
GLUCOSE BLDC GLUCOMTR-MCNC: 272 MG/DL (ref 70–99)
GLUCOSE SERPL-MCNC: 174 MG/DL (ref 65–99)
HCT VFR BLD AUTO: 37 % (ref 37.5–51)
HGB BLD-MCNC: 11.7 G/DL (ref 13–17.7)
IMM GRANULOCYTES # BLD AUTO: 0.03 10*3/MM3 (ref 0–0.05)
IMM GRANULOCYTES NFR BLD AUTO: 0.3 % (ref 0–0.5)
LYMPHOCYTES # BLD AUTO: 2.72 10*3/MM3 (ref 0.7–3.1)
LYMPHOCYTES NFR BLD AUTO: 25.3 % (ref 19.6–45.3)
MAGNESIUM SERPL-MCNC: 1.6 MG/DL (ref 1.6–2.4)
MCH RBC QN AUTO: 30.5 PG (ref 26.6–33)
MCHC RBC AUTO-ENTMCNC: 31.6 G/DL (ref 31.5–35.7)
MCV RBC AUTO: 96.6 FL (ref 79–97)
MONOCYTES # BLD AUTO: 1.01 10*3/MM3 (ref 0.1–0.9)
MONOCYTES NFR BLD AUTO: 9.4 % (ref 5–12)
NEUTROPHILS NFR BLD AUTO: 6.56 10*3/MM3 (ref 1.7–7)
NEUTROPHILS NFR BLD AUTO: 61.1 % (ref 42.7–76)
NRBC BLD AUTO-RTO: 0 /100 WBC (ref 0–0.2)
PHOSPHATE SERPL-MCNC: 4.1 MG/DL (ref 2.5–4.5)
PLATELET # BLD AUTO: 219 10*3/MM3 (ref 140–450)
PMV BLD AUTO: 11.3 FL (ref 6–12)
POTASSIUM SERPL-SCNC: 3.4 MMOL/L (ref 3.5–5.2)
PROT SERPL-MCNC: 6.2 G/DL (ref 6–8.5)
RBC # BLD AUTO: 3.83 10*6/MM3 (ref 4.14–5.8)
SODIUM SERPL-SCNC: 138 MMOL/L (ref 136–145)
WBC NRBC COR # BLD AUTO: 10.73 10*3/MM3 (ref 3.4–10.8)

## 2024-12-15 PROCEDURE — 84100 ASSAY OF PHOSPHORUS: CPT | Performed by: PHYSICIAN ASSISTANT

## 2024-12-15 PROCEDURE — 94799 UNLISTED PULMONARY SVC/PX: CPT

## 2024-12-15 PROCEDURE — 82948 REAGENT STRIP/BLOOD GLUCOSE: CPT

## 2024-12-15 PROCEDURE — 63710000001 INSULIN LISPRO (HUMAN) PER 5 UNITS: Performed by: STUDENT IN AN ORGANIZED HEALTH CARE EDUCATION/TRAINING PROGRAM

## 2024-12-15 PROCEDURE — 82948 REAGENT STRIP/BLOOD GLUCOSE: CPT | Performed by: STUDENT IN AN ORGANIZED HEALTH CARE EDUCATION/TRAINING PROGRAM

## 2024-12-15 PROCEDURE — 99232 SBSQ HOSP IP/OBS MODERATE 35: CPT | Performed by: INTERNAL MEDICINE

## 2024-12-15 PROCEDURE — 80053 COMPREHEN METABOLIC PANEL: CPT | Performed by: PHYSICIAN ASSISTANT

## 2024-12-15 PROCEDURE — 85025 COMPLETE CBC W/AUTO DIFF WBC: CPT | Performed by: PHYSICIAN ASSISTANT

## 2024-12-15 PROCEDURE — 94664 DEMO&/EVAL PT USE INHALER: CPT

## 2024-12-15 PROCEDURE — 83735 ASSAY OF MAGNESIUM: CPT | Performed by: PHYSICIAN ASSISTANT

## 2024-12-15 RX ADMIN — HYDROCHLOROTHIAZIDE 25 MG: 25 TABLET ORAL at 08:26

## 2024-12-15 RX ADMIN — ATORVASTATIN CALCIUM 80 MG: 40 TABLET, FILM COATED ORAL at 20:54

## 2024-12-15 RX ADMIN — Medication 10 ML: at 20:56

## 2024-12-15 RX ADMIN — MECLIZINE HYDROCHLORIDE 12.5 MG: 25 TABLET ORAL at 15:19

## 2024-12-15 RX ADMIN — INSULIN LISPRO 2 UNITS: 100 INJECTION, SOLUTION INTRAVENOUS; SUBCUTANEOUS at 08:25

## 2024-12-15 RX ADMIN — NIFEDIPINE 30 MG: 30 TABLET, FILM COATED, EXTENDED RELEASE ORAL at 08:25

## 2024-12-15 RX ADMIN — ARFORMOTEROL TARTRATE 15 MCG: 15 SOLUTION RESPIRATORY (INHALATION) at 19:49

## 2024-12-15 RX ADMIN — BUDESONIDE 0.5 MG: 0.5 INHALANT RESPIRATORY (INHALATION) at 07:44

## 2024-12-15 RX ADMIN — MECLIZINE HYDROCHLORIDE 12.5 MG: 25 TABLET ORAL at 08:25

## 2024-12-15 RX ADMIN — INSULIN LISPRO 2 UNITS: 100 INJECTION, SOLUTION INTRAVENOUS; SUBCUTANEOUS at 17:34

## 2024-12-15 RX ADMIN — BUDESONIDE 0.5 MG: 0.5 INHALANT RESPIRATORY (INHALATION) at 19:49

## 2024-12-15 RX ADMIN — INSULIN LISPRO 4 UNITS: 100 INJECTION, SOLUTION INTRAVENOUS; SUBCUTANEOUS at 12:01

## 2024-12-15 RX ADMIN — ARFORMOTEROL TARTRATE 15 MCG: 15 SOLUTION RESPIRATORY (INHALATION) at 07:44

## 2024-12-15 RX ADMIN — APIXABAN 5 MG: 5 TABLET, FILM COATED ORAL at 20:55

## 2024-12-15 RX ADMIN — ASPIRIN 81 MG: 81 TABLET, CHEWABLE ORAL at 08:26

## 2024-12-15 RX ADMIN — MECLIZINE HYDROCHLORIDE 12.5 MG: 25 TABLET ORAL at 20:55

## 2024-12-15 RX ADMIN — APIXABAN 5 MG: 5 TABLET, FILM COATED ORAL at 08:26

## 2024-12-15 RX ADMIN — PANTOPRAZOLE SODIUM 40 MG: 40 TABLET, DELAYED RELEASE ORAL at 08:26

## 2024-12-15 RX ADMIN — TAMSULOSIN HYDROCHLORIDE 0.4 MG: 0.4 CAPSULE ORAL at 20:54

## 2024-12-15 RX ADMIN — TERAZOSIN HYDROCHLORIDE 10 MG: 5 CAPSULE ORAL at 20:54

## 2024-12-15 RX ADMIN — ATENOLOL 50 MG: 50 TABLET ORAL at 08:26

## 2024-12-15 RX ADMIN — INSULIN LISPRO 3 UNITS: 100 INJECTION, SOLUTION INTRAVENOUS; SUBCUTANEOUS at 20:55

## 2024-12-15 RX ADMIN — FINASTERIDE 5 MG: 5 TABLET, FILM COATED ORAL at 08:26

## 2024-12-15 RX ADMIN — Medication 10 ML: at 08:27

## 2024-12-15 NOTE — PLAN OF CARE
Goal Outcome Evaluation:              Outcome Evaluation: Ambulated 400 feet with three rest periods. Denied dizziness. Pt becomes SOA easily. Sitting on side of bed eating dinner.

## 2024-12-15 NOTE — PROGRESS NOTES
[facility]   Hospitalist Progress Note       Patient Name: Toney Neri  : 1935  MRN: [id number]  Primary Care Physician: Una Neves APRN  Date of admission: 2024  Today's Date: 12/15/2024  Room / Bed:   217/2  Subjective   Chief Complaint: Dizziness, left arm numbness/tingling     HPI:     Toney Neri is a 89 y.o. male with a past medical history of hypertension, hyperlipidemia, type 2 diabetes mellitus, paroxysmal A-fib on Eliquis, BPH, COPD presented to the ED for evaluation of intermittent, dizziness left arm numbness and tingling that started around 1130 hrs. today.  Numbness and tingling was more pronounced in the left hand that has lasted for approximately 5 minutes and has resolved spontaneously.  Since then patient continues to have intermittent episodes of dizziness/lightheadedness.  Not associated with movements of the head.  Denies any ear pain, ear discharge, headache, nausea, vomiting, any other focal weakness, numbness, tingling.  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  Noted to have mild shortness of breath and wheezing during my evaluation.  Has a previous 30-pack-year smoking history, quit many years ago.    Interval Followup: 12/15/2024  No acute events over the last 24 hours, patient resting comfortably in chair, still complaining of some intermittent dizziness with movements      REVIEW OF SYSTEMS:   Lightheadedness  Objective   Temp:  [97.2 °F (36.2 °C)-98.4 °F (36.9 °C)] 97.2 °F (36.2 °C)  Heart Rate:  [] 76  Resp:  [18-20] 18  BP: (111-164)/(60-91) 111/60  PHYSICAL EXAM   GEN: No acute distress  HEENT: Moist mucous membranes  LUNGS: Equal chest rise bilaterally  CARDIAC: Regular rate and rhythm  NEURO: Moving all 4 extremities spontaneously  SKIN: No obvious breakdown  Results from last 7 days   Lab Units 12/15/24  0437 24  0501 24  0436 24  1542   WBC  10*3/mm3 10.73 10.71 10.08 11.58*   HEMOGLOBIN g/dL 11.7* 12.1* 12.0* 12.2*   HEMATOCRIT % 37.0* 38.3 37.9 37.7   PLATELETS 10*3/mm3 219 233 215 257     Results from last 7 days   Lab Units 12/15/24  0437 12/14/24  0501 12/13/24  0436 12/12/24  1542   SODIUM mmol/L 138 140 140 136   POTASSIUM mmol/L 3.4* 3.6 3.5 4.3   CO2 mmol/L 26.9 25.9 23.8 22.3   CHLORIDE mmol/L 101 100 105 101   ANION GAP mmol/L 10.1 14.1 11.2 12.7   BUN mg/dL 23 16 17 18   CREATININE mg/dL 1.26 1.13 1.18 1.44*   GLUCOSE mg/dL 174* 139* 138* 186*     Results from last 7 days   Lab Units 12/12/24  1542   INR  1.44*     COMPLEXITY OF DATA / DECISION MAKING     []  Moderate: One acute illness or mild exacerbation of chronic or 2 stable chronic or tx side effects   []  High:  Severe acute illness or severe exacerbation of chronic - potential for major debility / life threatening         I have personally reviewed the results from the time of this admission to 12/15/2024 11:32 EST:  []  Laboratory:  []  Microbiology: []  Radiology:  []  Telemetry:   []  Cardiology/Vascular:  []  Pathology:  []  Prior external records:  []  Independent historian provided additional details:      []  Discussed case with specialists:    []  Independent interpretation of ECG/Imaging etc:             []  Moderate: Rx management, low risk surgery, suboptimal social situation   []  High:  Rx with close monitoring for toxicity, mod-high risk surgery, DNR decision, Comfort initiated, IV pain meds    Assessment / Plan   Assessment:    Dizziness.  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    Stroke/TIA pathway.  Discussed CT cervical spine showing moderate to severe degenerative changes/multilevel mostly C3-C7  Neurology  consulted  MRI brain negative for acute stroke  Check ortho BP  Continue PT to help with mobility  Monitored bed  Continue Eliquis  Sliding scale insulin protocol  Continue aspirin and statin  Additional recommendations pending clinical course  Discussed plan with RN.  VTE Prophylaxis:  Pharmacologic & mechanical VTE prophylaxis orders are present.      CODE STATUS:      Level Of Support Discussed With: Patient  Code Status (Patient has no pulse and is not breathing): CPR (Attempt to Resuscitate)  Medical Interventions (Patient has pulse or is breathing): Full Support           Electronically signed by Silvestre Root MD, 12/15/2024, 11:33 EST.

## 2024-12-16 ENCOUNTER — READMISSION MANAGEMENT (OUTPATIENT)
Dept: CALL CENTER | Facility: HOSPITAL | Age: 89
End: 2024-12-16
Payer: MEDICARE

## 2024-12-16 VITALS
HEART RATE: 94 BPM | BODY MASS INDEX: 36.96 KG/M2 | HEIGHT: 69 IN | DIASTOLIC BLOOD PRESSURE: 67 MMHG | SYSTOLIC BLOOD PRESSURE: 108 MMHG | TEMPERATURE: 97.2 F | OXYGEN SATURATION: 93 % | WEIGHT: 249.56 LBS | RESPIRATION RATE: 22 BRPM

## 2024-12-16 LAB
ALBUMIN SERPL-MCNC: 3.4 G/DL (ref 3.5–5.2)
ALBUMIN/GLOB SERPL: 1.2 G/DL
ALP SERPL-CCNC: 59 U/L (ref 39–117)
ALT SERPL W P-5'-P-CCNC: 11 U/L (ref 1–41)
ANION GAP SERPL CALCULATED.3IONS-SCNC: 9.5 MMOL/L (ref 5–15)
AST SERPL-CCNC: 16 U/L (ref 1–40)
BASOPHILS # BLD AUTO: 0.04 10*3/MM3 (ref 0–0.2)
BASOPHILS NFR BLD AUTO: 0.4 % (ref 0–1.5)
BILIRUB SERPL-MCNC: 0.5 MG/DL (ref 0–1.2)
BUN SERPL-MCNC: 24 MG/DL (ref 8–23)
BUN/CREAT SERPL: 20.2 (ref 7–25)
CALCIUM SPEC-SCNC: 9.2 MG/DL (ref 8.6–10.5)
CHLORIDE SERPL-SCNC: 102 MMOL/L (ref 98–107)
CO2 SERPL-SCNC: 26.5 MMOL/L (ref 22–29)
CREAT SERPL-MCNC: 1.19 MG/DL (ref 0.76–1.27)
DEPRECATED RDW RBC AUTO: 46 FL (ref 37–54)
EGFRCR SERPLBLD CKD-EPI 2021: 58.4 ML/MIN/1.73
EOSINOPHIL # BLD AUTO: 0.31 10*3/MM3 (ref 0–0.4)
EOSINOPHIL NFR BLD AUTO: 3.2 % (ref 0.3–6.2)
ERYTHROCYTE [DISTWIDTH] IN BLOOD BY AUTOMATED COUNT: 13.5 % (ref 12.3–15.4)
GLOBULIN UR ELPH-MCNC: 2.9 GM/DL
GLUCOSE BLDC GLUCOMTR-MCNC: 175 MG/DL (ref 70–99)
GLUCOSE BLDC GLUCOMTR-MCNC: 200 MG/DL (ref 70–99)
GLUCOSE SERPL-MCNC: 173 MG/DL (ref 65–99)
HCT VFR BLD AUTO: 36.7 % (ref 37.5–51)
HGB BLD-MCNC: 11.7 G/DL (ref 13–17.7)
IMM GRANULOCYTES # BLD AUTO: 0.02 10*3/MM3 (ref 0–0.05)
IMM GRANULOCYTES NFR BLD AUTO: 0.2 % (ref 0–0.5)
LYMPHOCYTES # BLD AUTO: 2.39 10*3/MM3 (ref 0.7–3.1)
LYMPHOCYTES NFR BLD AUTO: 25 % (ref 19.6–45.3)
MAGNESIUM SERPL-MCNC: 1.6 MG/DL (ref 1.6–2.4)
MCH RBC QN AUTO: 30.2 PG (ref 26.6–33)
MCHC RBC AUTO-ENTMCNC: 31.9 G/DL (ref 31.5–35.7)
MCV RBC AUTO: 94.8 FL (ref 79–97)
MONOCYTES # BLD AUTO: 0.91 10*3/MM3 (ref 0.1–0.9)
MONOCYTES NFR BLD AUTO: 9.5 % (ref 5–12)
NEUTROPHILS NFR BLD AUTO: 5.88 10*3/MM3 (ref 1.7–7)
NEUTROPHILS NFR BLD AUTO: 61.7 % (ref 42.7–76)
NRBC BLD AUTO-RTO: 0 /100 WBC (ref 0–0.2)
PHOSPHATE SERPL-MCNC: 4 MG/DL (ref 2.5–4.5)
PLATELET # BLD AUTO: 208 10*3/MM3 (ref 140–450)
PMV BLD AUTO: 10.7 FL (ref 6–12)
POTASSIUM SERPL-SCNC: 3.3 MMOL/L (ref 3.5–5.2)
PROT SERPL-MCNC: 6.3 G/DL (ref 6–8.5)
RBC # BLD AUTO: 3.87 10*6/MM3 (ref 4.14–5.8)
SODIUM SERPL-SCNC: 138 MMOL/L (ref 136–145)
WBC NRBC COR # BLD AUTO: 9.55 10*3/MM3 (ref 3.4–10.8)

## 2024-12-16 PROCEDURE — 63710000001 INSULIN LISPRO (HUMAN) PER 5 UNITS: Performed by: STUDENT IN AN ORGANIZED HEALTH CARE EDUCATION/TRAINING PROGRAM

## 2024-12-16 PROCEDURE — 25010000002 MAGNESIUM SULFATE 2 GM/50ML SOLUTION: Performed by: INTERNAL MEDICINE

## 2024-12-16 PROCEDURE — 85025 COMPLETE CBC W/AUTO DIFF WBC: CPT | Performed by: INTERNAL MEDICINE

## 2024-12-16 PROCEDURE — 99239 HOSP IP/OBS DSCHRG MGMT >30: CPT | Performed by: INTERNAL MEDICINE

## 2024-12-16 PROCEDURE — 94799 UNLISTED PULMONARY SVC/PX: CPT

## 2024-12-16 PROCEDURE — 94664 DEMO&/EVAL PT USE INHALER: CPT

## 2024-12-16 PROCEDURE — 84100 ASSAY OF PHOSPHORUS: CPT | Performed by: INTERNAL MEDICINE

## 2024-12-16 PROCEDURE — 80053 COMPREHEN METABOLIC PANEL: CPT | Performed by: INTERNAL MEDICINE

## 2024-12-16 PROCEDURE — 83735 ASSAY OF MAGNESIUM: CPT | Performed by: INTERNAL MEDICINE

## 2024-12-16 PROCEDURE — 82948 REAGENT STRIP/BLOOD GLUCOSE: CPT | Performed by: STUDENT IN AN ORGANIZED HEALTH CARE EDUCATION/TRAINING PROGRAM

## 2024-12-16 RX ORDER — ALBUTEROL SULFATE 1.25 MG/3ML
1 SOLUTION RESPIRATORY (INHALATION) EVERY 6 HOURS PRN
Qty: 200 EACH | Refills: 0 | Status: SHIPPED | OUTPATIENT
Start: 2024-12-16 | End: 2025-02-14

## 2024-12-16 RX ORDER — POTASSIUM CHLORIDE 750 MG/1
40 CAPSULE, EXTENDED RELEASE ORAL ONCE
Status: COMPLETED | OUTPATIENT
Start: 2024-12-16 | End: 2024-12-16

## 2024-12-16 RX ORDER — MAGNESIUM SULFATE HEPTAHYDRATE 40 MG/ML
2 INJECTION, SOLUTION INTRAVENOUS ONCE
Status: COMPLETED | OUTPATIENT
Start: 2024-12-16 | End: 2024-12-16

## 2024-12-16 RX ORDER — MECLIZINE HYDROCHLORIDE 25 MG/1
25 TABLET ORAL 3 TIMES DAILY
Qty: 15 TABLET | Refills: 0 | Status: SHIPPED | OUTPATIENT
Start: 2024-12-16

## 2024-12-16 RX ADMIN — MAGNESIUM SULFATE HEPTAHYDRATE 2 G: 40 INJECTION, SOLUTION INTRAVENOUS at 08:16

## 2024-12-16 RX ADMIN — ARFORMOTEROL TARTRATE 15 MCG: 15 SOLUTION RESPIRATORY (INHALATION) at 06:57

## 2024-12-16 RX ADMIN — ATENOLOL 50 MG: 50 TABLET ORAL at 08:16

## 2024-12-16 RX ADMIN — APIXABAN 5 MG: 5 TABLET, FILM COATED ORAL at 08:16

## 2024-12-16 RX ADMIN — FINASTERIDE 5 MG: 5 TABLET, FILM COATED ORAL at 08:16

## 2024-12-16 RX ADMIN — HYDROCHLOROTHIAZIDE 25 MG: 25 TABLET ORAL at 08:15

## 2024-12-16 RX ADMIN — MECLIZINE HYDROCHLORIDE 12.5 MG: 25 TABLET ORAL at 08:15

## 2024-12-16 RX ADMIN — BUDESONIDE 0.5 MG: 0.5 INHALANT RESPIRATORY (INHALATION) at 06:57

## 2024-12-16 RX ADMIN — INSULIN LISPRO 3 UNITS: 100 INJECTION, SOLUTION INTRAVENOUS; SUBCUTANEOUS at 12:46

## 2024-12-16 RX ADMIN — INSULIN LISPRO 2 UNITS: 100 INJECTION, SOLUTION INTRAVENOUS; SUBCUTANEOUS at 08:17

## 2024-12-16 RX ADMIN — POTASSIUM CHLORIDE 40 MEQ: 750 CAPSULE, EXTENDED RELEASE ORAL at 08:15

## 2024-12-16 RX ADMIN — Medication 10 ML: at 08:18

## 2024-12-16 RX ADMIN — NIFEDIPINE 30 MG: 30 TABLET, FILM COATED, EXTENDED RELEASE ORAL at 08:22

## 2024-12-16 RX ADMIN — ASPIRIN 81 MG: 81 TABLET, CHEWABLE ORAL at 08:15

## 2024-12-16 RX ADMIN — PANTOPRAZOLE SODIUM 40 MG: 40 TABLET, DELAYED RELEASE ORAL at 08:15

## 2024-12-16 NOTE — DISCHARGE SUMMARY
[facility]         HOSPITALIST  DISCHARGE SUMMARY    Patient Name: Toney Neri  : 1935  MRN: [id number]    Date of Admission: 2024  Date of Discharge:  2024  Primary Care Physician: Una Neves APRN    Consults       Date and Time Order Name Status Description    2024 11:13 PM Inpatient Neurology Consult Stroke Completed     2024  7:27 PM Hospitalist (on-call MD unless specified)              Active and Resolved Hospital Problems:  Dizziness.  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 male with a past medical history of hypertension, hyperlipidemia, type 2 diabetes mellitus, paroxysmal A-fib on Eliquis, BPH, COPD presented to the ED for evaluation of intermittent, dizziness left arm numbness and tingling that started around 1130 hrs. today. Numbness and tingling was more pronounced in the left hand that has lasted for approximately 5 minutes and has resolved spontaneously. Since then patient continues to have intermittent episodes of dizziness/lightheadedness. Not associated with movements of the head. Denies any ear pain, ear discharge, headache, nausea, vomiting, any other focal weakness, numbness, tingling. 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 Noted to have mild shortness of breath and wheezing during my evaluation. Has a previous 30-pack-year smoking history, quit many years ago.  Patient complained of continued dizziness although this was at his baseline.  Patient states that he did have some improvement with meclizine being scheduled, patient MRI brain was negative for acute  strokes, imaging of the cervical spine significant for no spinal disease, right upper extremity numbness improved.  If patient has continued symptoms he will need follow-up with neurosurgery.  Patient is discharged home today in stable condition    Day of Discharge     Vital Signs:  Temp:  [97.2 °F (36.2 °C)-97.7 °F (36.5 °C)] 97.2 °F (36.2 °C)  Heart Rate:  [] 94  Resp:  [18-22] 22  BP: (108-151)/(51-90) 108/67    Physical Exam:   GEN: No acute distress  HEENT: Moist mucous membranes  LUNGS: Equal chest rise bilaterally  CARDIAC: Regular rate and rhythm  NEURO: Moving all 4 extremities spontaneously  SKIN: No obvious breakdown    Discharge Details        Discharge Medications        Changes to Medications        Instructions Start Date   albuterol 1.25 MG/3ML nebulizer solution  Commonly known as: ACCUNEB  What changed: You were already taking a medication with the same name, and this prescription was added. Make sure you understand how and when to take each.   1.25 mg, Nebulization, Every 6 Hours PRN      albuterol sulfate  (90 Base) MCG/ACT inhaler  Commonly known as: PROVENTIL HFA;VENTOLIN HFA;PROAIR HFA  What changed: Another medication with the same name was added. Make sure you understand how and when to take each.   2 puffs, Every 4 Hours PRN      meclizine 25 MG tablet  Commonly known as: ANTIVERT  What changed:   when to take this  reasons to take this   25 mg, Oral, 3 times daily             Continue These Medications        Instructions Start Date   acetaminophen 325 MG tablet  Commonly known as: TYLENOL   325 mg, Daily      Airsupra 90-80 MCG/ACT aerosol  Generic drug: Albuterol-Budesonide   2 puffs, Inhalation, Every 4 Hours PRN      Aspirin Low Dose 81 MG EC tablet  Generic drug: aspirin   81 mg, Daily      atenolol 50 MG tablet  Commonly known as: TENORMIN   50 mg, Every Morning      carboxymethylcellulose 0.5 % solution  Commonly known as: REFRESH PLUS   1 drop, Both Eyes, 4 Times  Daily      Eliquis 5 MG tablet tablet  Generic drug: apixaban   5 mg, 2 times daily      finasteride 5 MG tablet  Commonly known as: PROSCAR   5 mg, Daily      hydroCHLOROthiazide 25 MG tablet   25 mg, Oral, Daily      loratadine 10 MG tablet  Commonly known as: CLARITIN   10 mg, Every Morning      Magnesium Oxide -Mg Supplement 400 (240 Mg) MG tablet   400 mg, Daily      Melatonin 3 MG capsule   3 mg, 2 Times Daily      metFORMIN 1000 MG tablet  Commonly known as: GLUCOPHAGE   1,000 mg, 2 Times Daily      multivitamins-minerals capsule capsule   1 capsule, 2 Times Daily      NIFEdipine CC 90 MG 24 hr tablet  Commonly known as: ADALAT CC   90 mg, Daily      pantoprazole 40 MG EC tablet  Commonly known as: PROTONIX   40 mg, Oral, Daily      potassium chloride 10 MEQ CR tablet   10 mEq, Daily      simvastatin 40 MG tablet  Commonly known as: ZOCOR   40 mg, Nightly      tamsulosin 0.4 MG capsule 24 hr capsule  Commonly known as: FLOMAX   0.4 mg, Oral, Daily      terazosin 10 MG capsule  Commonly known as: HYTRIN   10 mg, Oral, Nightly      Trelegy Ellipta 200-62.5-25 MCG/ACT inhaler  Generic drug: Fluticasone-Umeclidin-Vilant   1 puff, Daily - RT      vitamin C 250 MG tablet  Commonly known as: ASCORBIC ACID   250 mg, Daily               No Known Allergies    Discharge Disposition:  Home or Self Care    Diet:  Hospital:  Diet Order   Procedures   • Diet: Cardiac, Diabetic; Healthy Heart (2-3 Na+); Consistent Carbohydrate; Fluid Consistency: Thin (IDDSI 0)       Discharge Activity:       CODE STATUS:  Code Status and Medical Interventions: CPR (Attempt to Resuscitate); Full Support   Ordered at: 12/12/24 2042     Level Of Support Discussed With:    Patient     Code Status (Patient has no pulse and is not breathing):    CPR (Attempt to Resuscitate)     Medical Interventions (Patient has pulse or is breathing):    Full Support       Future Appointments   Date Time Provider Department Center   3/7/2025 11:30 AM Kee  MD Jacobo [facility] CD [place]   4/16/2025  1:30 PM Yoli Le MD [facility] U [place]       Additional Instructions for the Follow-ups that You Need to Schedule       Discharge Follow-up with PCP   As directed       Currently Documented PCP:    Una Neves APRN    PCP Phone Number:    [phone]     Follow Up Details: 3 to 7 days                Pertinent  and/or Most Recent Results     IMAGING:  CT Cervical Spine Without Contrast    Result Date: 12/13/2024  CT CERVICAL SPINE WO CONTRAST Date of exam: 12/13/2024, 8:45 P.M., EST. Indications: Left arm tingling numbness; h/o cervical spine degenerative disease. Comparison: None available. TECHNIQUE: Axial CT images were obtained of the cervical spine without contrast administration. Reconstructed 2D coronal and sagittal images were also obtained. Automated exposure control and iterative construction methods were used. FINDINGS: Uncovertebral and facet osteoarthritis is seen at several levels, probably greatest at C6-7, followed by C5-6. Disc and endplate degenerative changes are also present, probably greatest at C5-6, C6-7, and C7-T1. 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 These findings suggest diffuse idiopathic skeletal hyperostosis (DISH). Arterial calcifications are seen. External artifacts obscure detail. No acute fracture or acute malalignment is identified. Please see below for findings on axial images. C1-2: There may be mild right C2 neural foraminal narrowing. No significant left C2 foraminal narrowing. No significant spinal canal narrowing is seen. C2-3: No significant spinal canal or neural foraminal narrowing. C3-4: Moderate-to-severe right, greater than left, bilateral C4 neural foraminal narrowing is seen. Broad-based disc-osteophyte complex is seen with partial effacement of the  ventral thecal sac and mild spinal canal narrowing. C4-5: Severe bilateral C5 foraminal narrowing is present with broad-based disc-osteophyte complex and moderate-to-severe spinal canal narrowing. Chronic anterolisthesis is seen, estimated at about 3 mm. C5-6: Severe bilateral C6 foraminal narrowing is seen, right greater than left. There is broad-based disc-osteophyte complex with severe spinal stenosis. C6-7: Moderate-to-severe right, greater than left, C7 neural foraminal narrowing is seen. Broad-based disc-osteophyte complex is present with severe spinal stenosis. C7-T1: Mild-to-moderate bilateral C8 foraminal narrowing is seen with broad-based disc-osteophyte complex and probably moderate-to-severe spinal stenosis. Beam-hardening artifact particularly obscures the spinal canal at this level. T1-2: This level is partially imaged. Mild-to-moderate T1 foraminal narrowing is suspected bilaterally. Probably no significant spinal canal narrowing is seen.        1. No acute findings. No acute vertebral compression fracture. 2. Degenerative changes are seen at multiple levels, as detailed above. 3. Please see above comments for further detail. Portions of this note were completed with a voice recognition program. Electronically Signed: Toney Loo MD  12/13/2024 9:24 PM EST  Workstation ID: [id number]    MRI Brain With & Without Contrast    Result Date: 12/12/2024  MRI BRAIN W WO CONTRAST Date of Exam: 12/12/2024 9:09 PM EST Indication: Persistent dizziness, left arm numbness.  Comparison: 12/12/2024 Technique:  Routine multiplanar/multisequence sequence images of the brain were obtained before and after the uneventful administration of 20 cc Multihance. Findings: No area of restricted diffusion is identified to suggest an acute intracranial infarct. Multifocal areas of T2/FLAIR signal increase are noted within the subcortical, deep cerebral, and periventricular white matter consistent with chronic small   vessel/microangiopathic ischemic changes. The ventricles and sulci are normal in size and configuration. No intracranial mass or mass effect. No extra-axial mass or collection. The posterior fossa is normal. Sellar and suprasellar structures are normal.  The major intracranial flow-voids of the [place] are patent. No abnormal intracranial enhancement. Major intracranial flow voids through the [place] are patent. Intracranial venous sinuses are patent. Orbital and periorbital soft tissues are normal. The visualized paranasal sinuses and ethmoid air cells are aerated. The mastoid air cells are aerated..     Impression: No acute intracranial pathology. Chronic small vessel/microangiopathic ischemic changes. Electronically Signed: Toney Garcia MD  12/12/2024 9:40 PM EST  Workstation ID: [id number]    XR Chest 1 View    Result Date: 12/12/2024  XR CHEST 1 VW Date of Exam: 12/12/2024 4:09 PM EST Indication: Acute Stroke Protocol (Onset < 12 hrs) Comparison: 10/14/2024 Findings: The lungs appear adequately aerated without consolidation or mass. No pleural effusion or pneumothorax is identified. Cardiac silhouette is enlarged. Pulmonary vasculature is unremarkable. No acute or suspicious osseous lesion is identified.     Impression: 1.Cardiomegaly. No acute radiographic abnormality is identified. Electronically Signed: Juancarlos Lacy MD  12/12/2024 4:16 PM EST  Workstation ID: [id number]    CT Head Without Contrast Stroke Protocol    Result Date: 12/12/2024  CT HEAD WO CONTRAST STROKE PROTOCOL Date of Exam: 12/12/2024 3:35 PM EST Indication: Neuro Deficit, acute, Stroke suspected Neuro deficit, acute, stroke suspected. Comparison:  MRI 10/14/2024 and prior including prior CT head 3/2/2019   Technique: Axial CT images were obtained of the head without contrast administration.  Reconstructed coronal and sagittal images were also obtained. Automated exposure control and iterative construction methods were used.  FINDINGS: Brain/Ventricles: There is diffuse atrophy. No suspicious extra-axial fluid collection is noted. No acute intracranial hemorrhage or mass effect noted. Chronic white matter changes compatible with sequela small vessel ischemic disease in this age group noted. Orbits: Calcification noted posteriorly along the periphery of the left globe similar to remote comparison. Thinning of the lenses noted within the globes bilaterally. No definite acute abnormality appreciated. Sinuses:The visualized paranasal sinuses and mastoid air cells demonstrate no acute abnormality. Soft Tissues/Skull: No acute abnormality of the visualized skull or soft tissues.     No acute intracranial abnormality. Electronically Signed: Rafy José MD  12/12/2024 3:44 PM EST  Workstation ID: [id number]    OCT, Retina - OU - Both Eyes    Result Date: 11/20/2024  Right Eye Quality was good. Progression has been stable. Findings include Abnormal Foveal Contour, Intra-retinal Fluid, Pigment epithelial detachment, Subretinal hyper-reflective material. Left Eye Quality was borderline. Scan locations included Subfoveal. Progression has been stable. Findings include Pigment epithelial detachment. Notes Stable OU      LAB RESULTS:      Lab 12/16/24  0445 12/15/24  0437 12/14/24  0501 12/13/24 0436 12/12/24  1542   WBC 9.55 10.73 10.71 10.08 11.58*   HEMOGLOBIN 11.7* 11.7* 12.1* 12.0* 12.2*   HEMATOCRIT 36.7* 37.0* 38.3 37.9 37.7   PLATELETS 208 219 233 215 257   NEUTROS ABS 5.88 6.56 6.66 6.42 7.40*   IMMATURE GRANS (ABS) 0.02 0.03 0.03 0.04 0.04   LYMPHS ABS 2.39 2.72 2.67 2.40 2.78   MONOS ABS 0.91* 1.01* 1.02* 0.92* 0.95*   EOS ABS 0.31 0.35 0.27 0.25 0.33   MCV 94.8 96.6 96.2 96.2 96.2   PROTIME  --   --   --   --  17.9*   APTT  --   --   --   --  43.6*         Lab 12/16/24 0445 12/15/24  0437 12/14/24  0501 12/13/24  0436 12/12/24  1542   SODIUM 138 138 140 140 136   POTASSIUM 3.3* 3.4* 3.6 3.5 4.3   CHLORIDE 102 101 100 105 101   CO2 26.5  26.9 25.9 23.8 22.3   ANION GAP 9.5 10.1 14.1 11.2 12.7   BUN 24* 23 16 17 18   CREATININE 1.19 1.26 1.13 1.18 1.44*   EGFR 58.4* 54.5* 62.1 59.0* 46.4*   GLUCOSE 173* 174* 139* 138* 186*   CALCIUM 9.2 9.1 8.8 9.0 9.1   MAGNESIUM 1.6 1.6 1.5* 1.5*  --    PHOSPHORUS 4.0 4.1 3.4 2.8  --    HEMOGLOBIN A1C  --   --   --  6.80*  --          Lab 12/16/24  0445 12/15/24  0437 12/14/24  0501 12/13/24  0436 12/12/24  1542   TOTAL PROTEIN 6.3 6.2 6.3 6.3 6.9   ALBUMIN 3.4* 3.4* 3.5 3.6 3.8   GLOBULIN 2.9 2.8 2.8 2.7 3.1   ALT (SGPT) 11 10 10 10 14   AST (SGOT) 16 16 16 16 28   BILIRUBIN 0.5 0.5 0.5 0.6 0.4   ALK PHOS 59 56 54 53 59         Lab 12/12/24  1542   PROTIME 17.9*   INR 1.44*         Lab 12/13/24  0436   CHOLESTEROL 103   LDL CHOL 36   HDL CHOL 44   TRIGLYCERIDES 133             Brief Urine Lab Results  (Last result in the past 365 days)        Color   Clarity   Blood   Leuk Est   Nitrite   Protein   CREAT   Urine HCG        12/12/24 1849 Dark Yellow   Clear   Negative   Trace   Negative   100 mg/dL (2+)                 Microbiology Results (last 10 days)       ** No results found for the last 240 hours. **          Results for orders placed during the hospital encounter of 08/04/21    Duplex Carotid Ultrasound CAR    Interpretation Summary  · Proximal right internal carotid artery plaque without significant stenosis.  · All other left side carotid system vessels are normal.  · All other right side carotid system vessels are normal.    Results for orders placed during the hospital encounter of 08/04/21    Duplex Carotid Ultrasound CAR    Interpretation Summary  · Proximal right internal carotid artery plaque without significant stenosis.  · All other left side carotid system vessels are normal.  · All other right side carotid system vessels are normal.    Results for orders placed during the hospital encounter of 10/14/24    Adult Transthoracic Echo Complete W/ Cont if Necessary Per Protocol    Interpretation  Summary  Normal left ventricular systolic function with mild left ventricular hypertrophy.  Mild aortic stenosis.  Mild mitral regurgitation.      Time spent on Discharge including face to face service: Greater than 30 minutes      Electronically signed by Silvestre Root MD, 12/16/24, 11:27 AM EST.

## 2024-12-17 NOTE — OUTREACH NOTE
Prep Survey      Flowsheet Row Responses   [facility] patient discharged from? [facility]   Is LACE score < 7 ? No   Eligibility Readm Mgmt   Discharge diagnosis Dizziness   Does the patient have one of the following disease processes/diagnoses(primary or secondary)? Other   Does the patient have Home health ordered? Yes   What is the Home health agency?  [facility]    Is there a DME ordered? Yes   What DME was ordered? home nebulizer   Prep survey completed? Yes            Camila FELIZ - Registered Nurse

## 2024-12-18 ENCOUNTER — READMISSION MANAGEMENT (OUTPATIENT)
Dept: CALL CENTER | Facility: HOSPITAL | Age: 89
End: 2024-12-18
Payer: MEDICARE

## 2024-12-18 NOTE — OUTREACH NOTE
Medical Week 1 Survey      Flowsheet Row Responses   [facility] patient discharged from? [facility]   Does the patient have one of the following disease processes/diagnoses(primary or secondary)? Other   Week 1 attempt successful? Yes   Call start time 1536   Call end time 1541   Discharge diagnosis Dizziness   Person spoke with today (if not patient) and relationship Patient   Meds reviewed with patient/caregiver? Yes   Does the patient have all medications ordered at discharge? Yes   Prescription comments Needs Mask- for neb. treatment advised to call pcp or reach out to pharm.   Is the patient taking all medications as directed (includes completed medication regime)? Yes   Medication comments Advised to start stool softener- miralax for constipation.   Does the patient have a primary care provider?  Yes   Comments regarding PCP Sees pcp next week.   What is the Home health agency?  [facility]    What DME was ordered? home nebulizer   Has all DME been delivered? Yes   Psychosocial issues? No   Did the patient receive a copy of their discharge instructions? Yes   Nursing interventions Reviewed instructions with patient   What is the patient's perception of their health status since discharge? Improving   Is the patient/caregiver able to teach back signs and symptoms related to disease process for when to call PCP? Yes   Is the patient/caregiver able to teach back signs and symptoms related to disease process for when to call 911? Yes   Is the patient/caregiver able to teach back the hierarchy of who to call/visit for symptoms/problems? PCP, Specialist, Home health nurse, Urgent Care, ED, 911 Yes   Additional teach back comments Today having trouble urinating.   Week 1 call completed? Yes   Wrap up additional comments Patient doing okay however having some trouble urinating increase fluids- aware if unable to empty bladder and abdomen is swollen he is to see an MD or go to the ER. Will follow up next week in  regards to urination and bowel movements.   Call end time 1339            Riddhi BRAND - Registered Nurse

## 2024-12-20 ENCOUNTER — HOSPITAL ENCOUNTER (OUTPATIENT)
Facility: HOSPITAL | Age: 89
Setting detail: OBSERVATION
Discharge: HOME | End: 2024-12-21
Attending: STUDENT IN AN ORGANIZED HEALTH CARE EDUCATION/TRAINING PROGRAM | Admitting: FAMILY MEDICINE
Payer: MEDICARE

## 2024-12-20 ENCOUNTER — APPOINTMENT (OUTPATIENT)
Dept: GENERAL RADIOLOGY | Facility: HOSPITAL | Age: 89
End: 2024-12-20
Payer: MEDICARE

## 2024-12-20 DIAGNOSIS — R42 VERTIGO: ICD-10-CM

## 2024-12-20 DIAGNOSIS — R26.2 DIFFICULTY IN WALKING: ICD-10-CM

## 2024-12-20 DIAGNOSIS — N39.0 URINARY TRACT INFECTION WITHOUT HEMATURIA, SITE UNSPECIFIED: Primary | ICD-10-CM

## 2024-12-20 LAB
ALBUMIN SERPL-MCNC: 3.8 G/DL (ref 3.5–5.2)
ALBUMIN/GLOB SERPL: 1.3 G/DL
ALP SERPL-CCNC: 60 U/L (ref 39–117)
ALT SERPL W P-5'-P-CCNC: 13 U/L (ref 1–41)
ANION GAP SERPL CALCULATED.3IONS-SCNC: 11.4 MMOL/L (ref 5–15)
AST SERPL-CCNC: 21 U/L (ref 1–40)
BACTERIA UR QL AUTO: ABNORMAL /HPF
BASOPHILS # BLD AUTO: 0.06 10*3/MM3 (ref 0–0.2)
BASOPHILS NFR BLD AUTO: 0.5 % (ref 0–1.5)
BILIRUB SERPL-MCNC: 0.3 MG/DL (ref 0–1.2)
BILIRUB UR QL STRIP: ABNORMAL
BUN SERPL-MCNC: 21 MG/DL (ref 8–23)
BUN/CREAT SERPL: 15.4 (ref 7–25)
CALCIUM SPEC-SCNC: 8.6 MG/DL (ref 8.6–10.5)
CHLORIDE SERPL-SCNC: 101 MMOL/L (ref 98–107)
CLARITY UR: CLEAR
CO2 SERPL-SCNC: 24.6 MMOL/L (ref 22–29)
COLOR UR: ABNORMAL
CREAT SERPL-MCNC: 1.36 MG/DL (ref 0.76–1.27)
DEPRECATED RDW RBC AUTO: 47.2 FL (ref 37–54)
EGFRCR SERPLBLD CKD-EPI 2021: 49.7 ML/MIN/1.73
EOSINOPHIL # BLD AUTO: 0.19 10*3/MM3 (ref 0–0.4)
EOSINOPHIL NFR BLD AUTO: 1.5 % (ref 0.3–6.2)
ERYTHROCYTE [DISTWIDTH] IN BLOOD BY AUTOMATED COUNT: 13.3 % (ref 12.3–15.4)
GLOBULIN UR ELPH-MCNC: 3 GM/DL
GLUCOSE SERPL-MCNC: 181 MG/DL (ref 65–99)
GLUCOSE UR STRIP-MCNC: NEGATIVE MG/DL
HCT VFR BLD AUTO: 38 % (ref 37.5–51)
HGB BLD-MCNC: 12.1 G/DL (ref 13–17.7)
HGB UR QL STRIP.AUTO: NEGATIVE
HOLD SPECIMEN: NORMAL
HOLD SPECIMEN: NORMAL
HYALINE CASTS UR QL AUTO: ABNORMAL /LPF
IMM GRANULOCYTES # BLD AUTO: 0.05 10*3/MM3 (ref 0–0.05)
IMM GRANULOCYTES NFR BLD AUTO: 0.4 % (ref 0–0.5)
KETONES UR QL STRIP: ABNORMAL
LEUKOCYTE ESTERASE UR QL STRIP.AUTO: ABNORMAL
LYMPHOCYTES # BLD AUTO: 2.86 10*3/MM3 (ref 0.7–3.1)
LYMPHOCYTES NFR BLD AUTO: 23.3 % (ref 19.6–45.3)
MAGNESIUM SERPL-MCNC: 1.6 MG/DL (ref 1.6–2.4)
MCH RBC QN AUTO: 31 PG (ref 26.6–33)
MCHC RBC AUTO-ENTMCNC: 31.8 G/DL (ref 31.5–35.7)
MCV RBC AUTO: 97.4 FL (ref 79–97)
MONOCYTES # BLD AUTO: 1.13 10*3/MM3 (ref 0.1–0.9)
MONOCYTES NFR BLD AUTO: 9.2 % (ref 5–12)
MUCOUS THREADS URNS QL MICRO: ABNORMAL /HPF
NEUTROPHILS NFR BLD AUTO: 65.1 % (ref 42.7–76)
NEUTROPHILS NFR BLD AUTO: 7.97 10*3/MM3 (ref 1.7–7)
NITRITE UR QL STRIP: NEGATIVE
NRBC BLD AUTO-RTO: 0 /100 WBC (ref 0–0.2)
PH UR STRIP.AUTO: 5.5 [PH] (ref 5–8)
PLATELET # BLD AUTO: 225 10*3/MM3 (ref 140–450)
PMV BLD AUTO: 10.7 FL (ref 6–12)
POTASSIUM SERPL-SCNC: 4.3 MMOL/L (ref 3.5–5.2)
PROT SERPL-MCNC: 6.8 G/DL (ref 6–8.5)
PROT UR QL STRIP: ABNORMAL
QT INTERVAL: 421 MS
QTC INTERVAL: 491 MS
RBC # BLD AUTO: 3.9 10*6/MM3 (ref 4.14–5.8)
RBC # UR STRIP: ABNORMAL /HPF
REF LAB TEST METHOD: ABNORMAL
SODIUM SERPL-SCNC: 137 MMOL/L (ref 136–145)
SP GR UR STRIP: 1.02 (ref 1–1.03)
SQUAMOUS #/AREA URNS HPF: ABNORMAL /HPF
UROBILINOGEN UR QL STRIP: ABNORMAL
WBC # UR STRIP: ABNORMAL /HPF
WBC NRBC COR # BLD AUTO: 12.26 10*3/MM3 (ref 3.4–10.8)
WHOLE BLOOD HOLD COAG: NORMAL
WHOLE BLOOD HOLD SPECIMEN: NORMAL

## 2024-12-20 PROCEDURE — 36415 COLL VENOUS BLD VENIPUNCTURE: CPT

## 2024-12-20 PROCEDURE — 83735 ASSAY OF MAGNESIUM: CPT | Performed by: EMERGENCY MEDICINE

## 2024-12-20 PROCEDURE — 99285 EMERGENCY DEPT VISIT HI MDM: CPT

## 2024-12-20 PROCEDURE — G0378 HOSPITAL OBSERVATION PER HR: HCPCS

## 2024-12-20 PROCEDURE — 85025 COMPLETE CBC W/AUTO DIFF WBC: CPT

## 2024-12-20 PROCEDURE — 93005 ELECTROCARDIOGRAM TRACING: CPT

## 2024-12-20 PROCEDURE — 82306 VITAMIN D 25 HYDROXY: CPT | Performed by: STUDENT IN AN ORGANIZED HEALTH CARE EDUCATION/TRAINING PROGRAM

## 2024-12-20 PROCEDURE — 93005 ELECTROCARDIOGRAM TRACING: CPT | Performed by: EMERGENCY MEDICINE

## 2024-12-20 PROCEDURE — 80053 COMPREHEN METABOLIC PANEL: CPT | Performed by: EMERGENCY MEDICINE

## 2024-12-20 PROCEDURE — 81001 URINALYSIS AUTO W/SCOPE: CPT

## 2024-12-20 PROCEDURE — 71045 X-RAY EXAM CHEST 1 VIEW: CPT

## 2024-12-20 RX ORDER — BISACODYL 5 MG/1
5 TABLET, DELAYED RELEASE ORAL DAILY PRN
Status: DISCONTINUED | OUTPATIENT
Start: 2024-12-20 | End: 2024-12-21 | Stop reason: HOSPADM

## 2024-12-20 RX ORDER — ONDANSETRON 2 MG/ML
4 INJECTION INTRAMUSCULAR; INTRAVENOUS EVERY 6 HOURS PRN
Status: DISCONTINUED | OUTPATIENT
Start: 2024-12-20 | End: 2024-12-21 | Stop reason: HOSPADM

## 2024-12-20 RX ORDER — POLYETHYLENE GLYCOL 3350 17 G/17G
17 POWDER, FOR SOLUTION ORAL DAILY PRN
Status: DISCONTINUED | OUTPATIENT
Start: 2024-12-20 | End: 2024-12-21 | Stop reason: HOSPADM

## 2024-12-20 RX ORDER — MECLIZINE HYDROCHLORIDE 25 MG/1
50 TABLET ORAL ONCE
Status: COMPLETED | OUTPATIENT
Start: 2024-12-20 | End: 2024-12-20

## 2024-12-20 RX ORDER — SODIUM CHLORIDE 0.9 % (FLUSH) 0.9 %
10 SYRINGE (ML) INJECTION EVERY 12 HOURS SCHEDULED
Status: DISCONTINUED | OUTPATIENT
Start: 2024-12-20 | End: 2024-12-21 | Stop reason: HOSPADM

## 2024-12-20 RX ORDER — SODIUM CHLORIDE 0.9 % (FLUSH) 0.9 %
10 SYRINGE (ML) INJECTION AS NEEDED
Status: DISCONTINUED | OUTPATIENT
Start: 2024-12-20 | End: 2024-12-21 | Stop reason: HOSPADM

## 2024-12-20 RX ORDER — BISACODYL 10 MG
10 SUPPOSITORY, RECTAL RECTAL DAILY PRN
Status: DISCONTINUED | OUTPATIENT
Start: 2024-12-20 | End: 2024-12-21 | Stop reason: HOSPADM

## 2024-12-20 RX ORDER — ENOXAPARIN SODIUM 100 MG/ML
40 INJECTION SUBCUTANEOUS DAILY
Status: DISCONTINUED | OUTPATIENT
Start: 2024-12-21 | End: 2024-12-21 | Stop reason: HOSPADM

## 2024-12-20 RX ORDER — SODIUM CHLORIDE 9 MG/ML
40 INJECTION, SOLUTION INTRAVENOUS AS NEEDED
Status: DISCONTINUED | OUTPATIENT
Start: 2024-12-20 | End: 2024-12-21 | Stop reason: HOSPADM

## 2024-12-20 RX ORDER — SODIUM CHLORIDE 9 MG/ML
100 INJECTION, SOLUTION INTRAVENOUS CONTINUOUS
Status: DISCONTINUED | OUTPATIENT
Start: 2024-12-20 | End: 2024-12-21 | Stop reason: HOSPADM

## 2024-12-20 RX ORDER — AMOXICILLIN 250 MG
2 CAPSULE ORAL 2 TIMES DAILY PRN
Status: DISCONTINUED | OUTPATIENT
Start: 2024-12-20 | End: 2024-12-21 | Stop reason: HOSPADM

## 2024-12-20 RX ADMIN — CEPHALEXIN 500 MG: 250 CAPSULE ORAL at 21:10

## 2024-12-20 RX ADMIN — MECLIZINE HYDROCHLORIDE 50 MG: 25 TABLET ORAL at 21:10

## 2024-12-21 ENCOUNTER — APPOINTMENT (OUTPATIENT)
Dept: MRI IMAGING | Facility: HOSPITAL | Age: 89
End: 2024-12-21
Payer: MEDICARE

## 2024-12-21 VITALS
RESPIRATION RATE: 16 BRPM | HEIGHT: 69 IN | HEART RATE: 90 BPM | BODY MASS INDEX: 37.65 KG/M2 | SYSTOLIC BLOOD PRESSURE: 144 MMHG | TEMPERATURE: 98.2 F | OXYGEN SATURATION: 96 % | WEIGHT: 254.19 LBS | DIASTOLIC BLOOD PRESSURE: 88 MMHG

## 2024-12-21 LAB
25(OH)D3 SERPL-MCNC: 26.6 NG/ML (ref 30–100)
ALBUMIN SERPL-MCNC: 3.7 G/DL (ref 3.5–5.2)
ALBUMIN/GLOB SERPL: 1.2 G/DL
ALP SERPL-CCNC: 59 U/L (ref 39–117)
ALT SERPL W P-5'-P-CCNC: 12 U/L (ref 1–41)
ANION GAP SERPL CALCULATED.3IONS-SCNC: 10.8 MMOL/L (ref 5–15)
AST SERPL-CCNC: 16 U/L (ref 1–40)
BASOPHILS # BLD AUTO: 0.06 10*3/MM3 (ref 0–0.2)
BASOPHILS NFR BLD AUTO: 0.5 % (ref 0–1.5)
BILIRUB SERPL-MCNC: 0.4 MG/DL (ref 0–1.2)
BUN SERPL-MCNC: 18 MG/DL (ref 8–23)
BUN/CREAT SERPL: 14.6 (ref 7–25)
CALCIUM SPEC-SCNC: 8.8 MG/DL (ref 8.6–10.5)
CHLORIDE SERPL-SCNC: 104 MMOL/L (ref 98–107)
CO2 SERPL-SCNC: 24.2 MMOL/L (ref 22–29)
CREAT SERPL-MCNC: 1.23 MG/DL (ref 0.76–1.27)
DEPRECATED RDW RBC AUTO: 47.6 FL (ref 37–54)
EGFRCR SERPLBLD CKD-EPI 2021: 56.1 ML/MIN/1.73
EOSINOPHIL # BLD AUTO: 0.19 10*3/MM3 (ref 0–0.4)
EOSINOPHIL NFR BLD AUTO: 1.6 % (ref 0.3–6.2)
ERYTHROCYTE [DISTWIDTH] IN BLOOD BY AUTOMATED COUNT: 13.4 % (ref 12.3–15.4)
GLOBULIN UR ELPH-MCNC: 3 GM/DL
GLUCOSE SERPL-MCNC: 232 MG/DL (ref 65–99)
HCT VFR BLD AUTO: 37.8 % (ref 37.5–51)
HGB BLD-MCNC: 11.8 G/DL (ref 13–17.7)
IMM GRANULOCYTES # BLD AUTO: 0.04 10*3/MM3 (ref 0–0.05)
IMM GRANULOCYTES NFR BLD AUTO: 0.3 % (ref 0–0.5)
LYMPHOCYTES # BLD AUTO: 2.31 10*3/MM3 (ref 0.7–3.1)
LYMPHOCYTES NFR BLD AUTO: 19.7 % (ref 19.6–45.3)
MCH RBC QN AUTO: 30.1 PG (ref 26.6–33)
MCHC RBC AUTO-ENTMCNC: 31.2 G/DL (ref 31.5–35.7)
MCV RBC AUTO: 96.4 FL (ref 79–97)
MONOCYTES # BLD AUTO: 1.09 10*3/MM3 (ref 0.1–0.9)
MONOCYTES NFR BLD AUTO: 9.3 % (ref 5–12)
NEUTROPHILS NFR BLD AUTO: 68.6 % (ref 42.7–76)
NEUTROPHILS NFR BLD AUTO: 8.01 10*3/MM3 (ref 1.7–7)
NRBC BLD AUTO-RTO: 0 /100 WBC (ref 0–0.2)
PLATELET # BLD AUTO: 217 10*3/MM3 (ref 140–450)
PMV BLD AUTO: 11.4 FL (ref 6–12)
POTASSIUM SERPL-SCNC: 3.8 MMOL/L (ref 3.5–5.2)
PROT SERPL-MCNC: 6.7 G/DL (ref 6–8.5)
RBC # BLD AUTO: 3.92 10*6/MM3 (ref 4.14–5.8)
SODIUM SERPL-SCNC: 139 MMOL/L (ref 136–145)
WBC NRBC COR # BLD AUTO: 11.7 10*3/MM3 (ref 3.4–10.8)

## 2024-12-21 PROCEDURE — 25010000002 ENOXAPARIN PER 10 MG: Performed by: FAMILY MEDICINE

## 2024-12-21 PROCEDURE — 96372 THER/PROPH/DIAG INJ SC/IM: CPT

## 2024-12-21 PROCEDURE — G0378 HOSPITAL OBSERVATION PER HR: HCPCS

## 2024-12-21 PROCEDURE — 25810000003 SODIUM CHLORIDE 0.9 % SOLUTION: Performed by: FAMILY MEDICINE

## 2024-12-21 PROCEDURE — 97161 PT EVAL LOW COMPLEX 20 MIN: CPT | Performed by: PHYSICAL THERAPIST

## 2024-12-21 PROCEDURE — 85025 COMPLETE CBC W/AUTO DIFF WBC: CPT | Performed by: FAMILY MEDICINE

## 2024-12-21 PROCEDURE — 80053 COMPREHEN METABOLIC PANEL: CPT | Performed by: FAMILY MEDICINE

## 2024-12-21 RX ORDER — MECLIZINE HYDROCHLORIDE 25 MG/1
25 TABLET ORAL EVERY 8 HOURS SCHEDULED
Status: DISCONTINUED | OUTPATIENT
Start: 2024-12-21 | End: 2024-12-21 | Stop reason: HOSPADM

## 2024-12-21 RX ADMIN — ENOXAPARIN SODIUM 40 MG: 100 INJECTION SUBCUTANEOUS at 09:01

## 2024-12-21 RX ADMIN — MECLIZINE HYDROCHLORIDE 25 MG: 25 TABLET ORAL at 02:20

## 2024-12-21 RX ADMIN — SODIUM CHLORIDE 100 ML/HR: 9 INJECTION, SOLUTION INTRAVENOUS at 01:36

## 2024-12-21 RX ADMIN — MECLIZINE HYDROCHLORIDE 25 MG: 25 TABLET ORAL at 13:48

## 2024-12-21 RX ADMIN — Medication 10 ML: at 01:35

## 2024-12-21 NOTE — ED PROVIDER NOTES
Time: 10:44 PM EST  Date of encounter:  12/20/2024  Independent Historian/Clinical History and Information was obtained by:   Patient    History is limited by: N/A    Chief Complaint: Lightheadedness      History of Present Illness:  Patient is a 89 y.o. year old male who presents to the emergency department for evaluation of lightheadedness.  Patient was recently admitted for dizziness and stroke workup was completed and very thorough.  Stroke workup was unremarkable and patient started on meclizine with improvement.  He has been home and doing well and reports today he had recurrence of the vertigo and is unable to get around.      Patient Care Team  Primary Care Provider: Una Neves APRN    Past Medical History:     No Known Allergies  Past Medical History:   Diagnosis Date    Acid reflux     Arthritis     Atrial fibrillation     CHALLAPPA FOLLOWS PT,    Cancer     Coronary artery disease     NO INTERVENTION    Diabetes mellitus, type 2     METFORMIN    Elevated PSA     Former smoker     INHALERS: TRELEGY, ALBUTEROL, NON SPECIFIC LUNG HX    High cholesterol     Hypertension     Limb swelling     Myocardial infarction     NO INTERVENTION, APPROX 2010    Skin lesion     RECENT SKIN CA REMOVED FROM FACE    Sleep apnea     NO CPAP    Urinary stream slowing      Past Surgical History:   Procedure Laterality Date    ANKLE SURGERY Right 1989    ankle repair    BICEPS TENDON REPAIR Right     COLONOSCOPY  2012    CYSTOSCOPY      CYSTOSCOPY RETROGRADE PYELOGRAM Bilateral 4/26/2022    Procedure: CYSTOSCOPY RETROGRADE PYELOGRAM;  Surgeon: Yoli Le MD;  Location: [facility];  Service: Urology;  Laterality: Bilateral;    CYSTOSCOPY RETROGRADE PYELOGRAM Bilateral 3/21/2023    Procedure: CYSTOSCOPY RETROGRADE PYELOGRAM;  Surgeon: Yoli Le MD;  Location: [facility] OR;  Service: Urology;  Laterality: Bilateral;    CYSTOSCOPY RETROGRADE PYELOGRAM Bilateral 10/10/2024    Procedure: CYSTOSCOPY  RETROGRADE PYELOGRAM;  Surgeon: Yoli Le MD;  Location: [facility] MAIN OR;  Service: Urology;  Laterality: Bilateral;    ENDOSCOPY  2018 2020    GALLBLADDER SURGERY      ROTATOR CUFF REPAIR Bilateral 1988 1990    TONSILLECTOMY      TRANSURETHRAL RESECTION OF BLADDER TUMOR N/A 08/19/2021    Procedure: CYSTOSCOPY TRANSURETHRAL RESECTION OF BLADDER TUMOR;  Surgeon: Yoli Le MD;  Location: [facility] MAIN OR;  Service: Urology;  Laterality: N/A;    TRANSURETHRAL RESECTION OF BLADDER TUMOR N/A 4/26/2022    Procedure: CYSTOSCOPY TRANSURETHRAL RESECTION OF BLADDER TUMOR;  Surgeon: Yoli Le MD;  Location: [facility] MAIN OR;  Service: Urology;  Laterality: N/A;    TRANSURETHRAL RESECTION OF BLADDER TUMOR N/A 11/10/2022    Procedure: CYSTOSCOPY TRANSURETHRAL RESECTION OF BLADDER TUMOR;  Surgeon: Yoli Le MD;  Location: [facility] MAIN OR;  Service: Urology;  Laterality: N/A;    TRANSURETHRAL RESECTION OF BLADDER TUMOR N/A 3/21/2023    Procedure: CYSTOSCOPY TRANSURETHRAL RESECTION OF BLADDER TUMOR;  Surgeon: Yoli Le MD;  Location: [facility] MAIN OR;  Service: Urology;  Laterality: N/A;     Family History   Problem Relation Age of Onset    Heart disease Mother     Arthritis Mother     Prostate cancer Father     Arthritis Father     Prostate cancer Son     Diabetes Other     Malig Hyperthermia Neg Hx        Home Medications:  Prior to Admission medications    Medication Sig Start Date End Date Taking? Authorizing Provider   acetaminophen (TYLENOL) 325 MG tablet Take 1 tablet by mouth Daily. 4/21/21   ProviderMaame MD   albuterol (ACCUNEB) 1.25 MG/3ML nebulizer solution Take 3 mL by nebulization Every 6 (Six) Hours As Needed for Shortness of Air for up to 60 days. 12/16/24 2/14/25  Silvestre Root MD   albuterol sulfate  (90 Base) MCG/ACT inhaler Inhale 2 puffs Every 4 (Four) Hours As Needed for Wheezing.    ProviderMaame MD   Albuterol-Budesonide (Airsupra) 90-80  MCG/ACT aerosol Inhale 2 puffs Every 4 (Four) Hours As Needed (for wheezing or shortness of air).    Maame Reyes MD   apixaban (Eliquis) 5 MG tablet tablet Take 1 tablet by mouth 2 (two) times a day. . 1/30/21   Maame Reyes MD   Aspirin Low Dose 81 MG EC tablet Take 1 tablet by mouth Daily. 4/21/21   Maame Reyes MD   atenolol (TENORMIN) 50 MG tablet Take 1 tablet by mouth Every Morning.    Maame Reyes MD   carboxymethylcellulose (REFRESH PLUS) 0.5 % solution Administer 1 drop to both eyes 4 (Four) Times a Day.    Maame Reyes MD   finasteride (PROSCAR) 5 MG tablet Take 1 tablet by mouth Daily. 7/26/22   Maame Reyes MD   hydroCHLOROthiazide 25 MG tablet Take 1 tablet by mouth Daily.    Maame Reyes MD   loratadine (CLARITIN) 10 MG tablet Take 1 tablet by mouth Every Morning. 10/20/21   Maame Reyes MD   Magnesium Oxide 400 (240 Mg) MG tablet Take 1 tablet by mouth Daily. 4/21/21   Maame Reyes MD   meclizine (ANTIVERT) 25 MG tablet Take 1 tablet by mouth 3 times a day. 12/16/24   Silvestre Root MD   Melatonin 3 MG capsule Take 1 capsule by mouth 2 (Two) Times a Day.    Maame Reyes MD   metFORMIN (GLUCOPHAGE) 1000 MG tablet Take 1 tablet by mouth 2 (Two) Times a Day. 1/30/21   Maame Reyes MD   multivitamins-minerals (PRESERVISION AREDS 2) capsule capsule Take 1 capsule by mouth 2 (Two) Times a Day. 9/16/24   Maame Reyes MD   NIFEdipine CC (ADALAT CC) 90 MG 24 hr tablet Take 1 tablet by mouth Daily. 10/11/24   Maame Reyes MD   pantoprazole (PROTONIX) 40 MG EC tablet Take 1 tablet by mouth Daily.    Maame Reyes MD   potassium chloride 10 MEQ CR tablet Take 1 tablet by mouth Daily. 1/30/21   Maame Reyes MD   simvastatin (ZOCOR) 40 MG tablet Take 1 tablet by mouth Every Night.    Maame Reyes MD   tamsulosin (FLOMAX) 0.4 MG capsule 24 hr capsule Take 1 capsule  "by mouth Daily. 12/2/24   Yoli Le MD   terazosin (HYTRIN) 10 MG capsule Take 1 capsule by mouth Every Night.    Provider, MD Raman Isabel Ellipta 200-62.5-25 MCG/ACT inhaler Inhale 1 puff Daily. 10/1/24   Provider, MD Maame   vitamin C (ASCORBIC ACID) 250 MG tablet Take 1 tablet by mouth Daily.    Provider, MD Maame        Social History:   Social History     Tobacco Use    Smoking status: Former     Types: Cigarettes    Smokeless tobacco: Never    Tobacco comments:     smoked 21-30 years   Vaping Use    Vaping status: Never Used   Substance Use Topics    Alcohol use: Yes     Comment: rarely drinks less than 1 drink per day has been drinking for 31 or more years    Drug use: Never         Review of Systems:  Review of Systems   Constitutional:  Negative for chills and fever.   HENT:  Negative for congestion, rhinorrhea and sore throat.    Eyes:  Negative for pain and visual disturbance.   Respiratory:  Negative for apnea, cough, chest tightness and shortness of breath.    Cardiovascular:  Negative for chest pain and palpitations.   Gastrointestinal:  Negative for abdominal pain, diarrhea, nausea and vomiting.   Genitourinary:  Negative for difficulty urinating and dysuria.   Musculoskeletal:  Negative for joint swelling and myalgias.   Skin:  Negative for color change.   Neurological:  Positive for dizziness. Negative for seizures and headaches.   Psychiatric/Behavioral: Negative.     All other systems reviewed and are negative.       Physical Exam:  /74 (BP Location: Left arm, Patient Position: Sitting)   Pulse 83   Temp 98.6 °F (37 °C) (Oral)   Resp 17   Ht 175.3 cm (69\")   Wt 113 kg (249 lb 1.9 oz)   SpO2 92%   BMI 36.79 kg/m²     Physical Exam  Vitals and nursing note reviewed.   Constitutional:       General: He is not in acute distress.     Appearance: Normal appearance. He is not toxic-appearing.   HENT:      Head: Normocephalic and atraumatic.      Jaw: There is " normal jaw occlusion.   Eyes:      General: Lids are normal.      Extraocular Movements: Extraocular movements intact.      Conjunctiva/sclera: Conjunctivae normal.      Pupils: Pupils are equal, round, and reactive to light.   Cardiovascular:      Rate and Rhythm: Normal rate and regular rhythm.      Pulses: Normal pulses.      Heart sounds: Normal heart sounds.   Pulmonary:      Effort: Pulmonary effort is normal. No respiratory distress.      Breath sounds: Normal breath sounds. No wheezing or rhonchi.   Abdominal:      General: Abdomen is flat.      Palpations: Abdomen is soft.      Tenderness: There is no abdominal tenderness. There is no guarding or rebound.   Musculoskeletal:         General: Normal range of motion.      Cervical back: Normal range of motion and neck supple.      Right lower leg: No edema.      Left lower leg: No edema.   Skin:     General: Skin is warm and dry.   Neurological:      Mental Status: He is alert and oriented to person, place, and time. Mental status is at baseline.   Psychiatric:         Mood and Affect: Mood normal.                    Medical Decision Making:      Comorbidities that affect care:    Diabetes, GERD    External Notes reviewed:    Hospital Discharge Summary: After admission for vertigo      The following orders were placed and all results were independently analyzed by me:  Orders Placed This Encounter   Procedures    XR Chest 1 View    MRI Brain Without Contrast    [place] Draw    Comprehensive Metabolic Panel    Magnesium    Urinalysis With Microscopic If Indicated (No Culture) - Urine, Clean Catch    CBC Auto Differential    Urinalysis, Microscopic Only - Urine, Clean Catch    Comprehensive Metabolic Panel    CBC Auto Differential    Diet: Cardiac; Healthy Heart (2-3 Na+); Fluid Consistency: Thin (IDDSI 0)    Undress & Gown    Continuous Pulse Oximetry    Vital Signs    Vital Signs    Activity - Ad Gloria    Intake & Output    Weigh Patient    Oral Care    Saline  Lock & Maintain IV Access    Code Status and Medical Interventions: CPR (Attempt to Resuscitate); Full Support    Inpatient Hospitalist Consult    PT Consult: Eval & Treat Discharge Placement Assessment, Functional Mobility Below Baseline    ECG 12 Lead ED Triage Standing Order; Weak / Dizzy / AMS    Insert Peripheral IV    Insert Peripheral IV    Initiate Observation Status    Fall Precautions    CBC & Differential    Green Top (Gel)    Lavender Top    Gold Top - SST    Light Blue Top    CBC & Differential       Medications Given in the Emergency Department:  Medications   Enoxaparin Sodium (LOVENOX) syringe 40 mg (has no administration in time range)   sodium chloride 0.9 % flush 10 mL (has no administration in time range)   sodium chloride 0.9 % flush 10 mL (has no administration in time range)   sodium chloride 0.9 % infusion 40 mL (has no administration in time range)   sennosides-docusate (PERICOLACE) 8.6-50 MG per tablet 2 tablet (has no administration in time range)     And   polyethylene glycol (MIRALAX) packet 17 g (has no administration in time range)     And   bisacodyl (DULCOLAX) EC tablet 5 mg (has no administration in time range)     And   bisacodyl (DULCOLAX) suppository 10 mg (has no administration in time range)   Potassium Replacement - Follow Nurse / BPA Driven Protocol (has no administration in time range)   Magnesium Standard Dose Replacement - Follow Nurse / BPA Driven Protocol (has no administration in time range)   Calcium Replacement - Follow Nurse / BPA Driven Protocol (has no administration in time range)   ondansetron (ZOFRAN) injection 4 mg (has no administration in time range)   melatonin tablet 2.5 mg (has no administration in time range)   sodium chloride 0.9 % infusion (has no administration in time range)   meclizine (ANTIVERT) tablet 50 mg (50 mg Oral Given 12/20/24 2110)   cephalexin (KEFLEX) capsule 500 mg (500 mg Oral Given 12/20/24 2110)        ED Course:         Labs:    Lab  Results (last 24 hours)       Procedure Component Value Units Date/Time    Urinalysis With Microscopic If Indicated (No Culture) - Urine, Clean Catch [[id number]]  (Abnormal) Collected: 12/20/24 1854    Specimen: Urine, Clean Catch Updated: 12/20/24 1908     Color, UA Dark Yellow     Appearance, UA Clear     pH, UA 5.5     Specific Gravity, UA 1.023     Glucose, UA Negative     Ketones, UA 15 mg/dL (1+)     Bilirubin, UA Small (1+)     Blood, UA Negative     Protein, UA 30 mg/dL (1+)     Leuk Esterase, UA Small (1+)     Nitrite, UA Negative     Urobilinogen, UA 1.0 E.U./dL    Urinalysis, Microscopic Only - Urine, Clean Catch [[id number]]  (Abnormal) Collected: 12/20/24 1854    Specimen: Urine, Clean Catch Updated: 12/20/24 1927     RBC, UA 3-5 /HPF      WBC, UA 3-5 /HPF      Bacteria, UA 1+ /HPF      Squamous Epithelial Cells, UA None Seen /HPF      Hyaline Casts, UA 7-12 /LPF      Mucus, UA Small/1+ /HPF      Methodology Manual Light Microscopy    CBC & Differential [[id number]]  (Abnormal) Collected: 12/20/24 1906    Specimen: Blood Updated: 12/20/24 1913    Narrative:      The following orders were created for panel order CBC & Differential.  Procedure                               Abnormality         Status                     ---------                               -----------         ------                     CBC Auto Differential[[id number]]        Abnormal            Final result                 Please view results for these tests on the individual orders.    CBC Auto Differential [[id number]]  (Abnormal) Collected: 12/20/24 1906    Specimen: Blood Updated: 12/20/24 1913     WBC 12.26 10*3/mm3      RBC 3.90 10*6/mm3      Hemoglobin 12.1 g/dL      Hematocrit 38.0 %      MCV 97.4 fL      MCH 31.0 pg      MCHC 31.8 g/dL      RDW 13.3 %      RDW-SD 47.2 fl      MPV 10.7 fL      Platelets 225 10*3/mm3      Neutrophil % 65.1 %      Lymphocyte % 23.3 %      Monocyte % 9.2 %      Eosinophil % 1.5 %      Basophil % 0.5 %       Immature Grans % 0.4 %      Neutrophils, Absolute 7.97 10*3/mm3      Lymphocytes, Absolute 2.86 10*3/mm3      Monocytes, Absolute 1.13 10*3/mm3      Eosinophils, Absolute 0.19 10*3/mm3      Basophils, Absolute 0.06 10*3/mm3      Immature Grans, Absolute 0.05 10*3/mm3      nRBC 0.0 /100 WBC     Comprehensive Metabolic Panel [[id number]]  (Abnormal) Collected: 12/20/24 2001    Specimen: Blood from Arm, Right Updated: 12/20/24 2039     Glucose 181 mg/dL      BUN 21 mg/dL      Creatinine 1.36 mg/dL      Sodium 137 mmol/L      Potassium 4.3 mmol/L      Comment: Slight hemolysis detected by analyzer. Result may be falsely elevated.        Chloride 101 mmol/L      CO2 24.6 mmol/L      Calcium 8.6 mg/dL      Total Protein 6.8 g/dL      Albumin 3.8 g/dL      ALT (SGPT) 13 U/L      AST (SGOT) 21 U/L      Comment: Slight hemolysis detected by analyzer. Result may be falsely elevated.        Alkaline Phosphatase 60 U/L      Total Bilirubin 0.3 mg/dL      Globulin 3.0 gm/dL      A/G Ratio 1.3 g/dL      BUN/Creatinine Ratio 15.4     Anion Gap 11.4 mmol/L      eGFR 49.7 mL/min/1.73     Narrative:      GFR Categories in Chronic Kidney Disease (CKD)      GFR Category          GFR (mL/min/1.73)    Interpretation  G1                     90 or greater         Normal or high (1)  G2                      60-89                Mild decrease (1)  G3a                   45-59                Mild to moderate decrease  G3b                   30-44                Moderate to severe decrease  G4                    15-29                Severe decrease  G5                    14 or less           Kidney failure          (1)In the absence of evidence of kidney disease, neither GFR category G1 or G2 fulfill the criteria for CKD.    eGFR calculation 2021 CKD-EPI creatinine equation, which does not include race as a factor    Magnesium [[id number]]  (Normal) Collected: 12/20/24 2001    Specimen: Blood from Arm, Right Updated: 12/20/24 2039      Magnesium 1.6 mg/dL              Imaging:    XR Chest 1 View    Result Date: 12/20/2024  XR CHEST 1 VW Date of Exam: 12/20/2024 7:30 PM EST Indication: Weak/Dizzy/AMS triage protocol Comparison: Chest radiograph 12/12/2024, chest radiograph 2/10/2020 Findings: Enlarged cardiac silhouette stable from prior. Chronic appearing interstitial change stable from prior. Negative for discrete lobar consolidation, edema, large effusion or pneumothorax. Stable blunting of the left costophrenic angle which could reflect pleural thickening or trace pleural fluid. Degenerative related osseous change. Aortic atherosclerotic disease.     Impression: Radiographically stable appearance of the chest including cardiomegaly, possible trace left pleural effusion and chronic appearing interstitial change. Electronically Signed: Charli Zaragoza MD  12/20/2024 8:12 PM EST  Workstation ID: [id number]       Differential Diagnosis and Discussion:    Dizziness: Based on the patient's history, signs, and symptoms, the diffential diagnosis includes but is not limited to meningitis, stroke, sepsis, subarachnoid hemorrhage, intracranial bleeding, encephalitis, vertigo, electrolyte imbalance, and metabolic disorders.    PROCEDURES:    Labs were drawn in the emergency department and all labs were reviewed and interpreted by me.  X-ray were performed in the emergency department and all X-ray impressions were independently interpreted by me.    ECG 12 Lead ED Triage Standing Order; Weak / Dizzy / AMS   Preliminary Result   HEART RATE=82  bpm   RR Rsqdxfrn=013  ms   MS Interval=  ms   P Horizontal Axis=  deg   P Front Axis=  deg   QRSD Interval=90  ms   QT Hobljeuy=274  ms   BGcQ=553  ms   QRS Axis=-12  deg   T Wave Axis=53  deg   - ABNORMAL ECG -   Atrial fibrillation   Ventricular premature complex   Low voltage, precordial leads   Borderline prolonged QT interval   Date and Time of Study:2024-12-20 19:22:29          Procedures    MDM  Number of  Diagnoses or Management Options  Urinary tract infection without hematuria, site unspecified  Vertigo  Diagnosis management comments: In summary this is an 89-year-old male patient who presents to the emergency department for evaluation of lightheadedness/dizziness.  He recently had a full and complete stroke workup that was negative for acute pathology.  He has been taking meclizine with improvement however today he has not had improvement.  He is requiring several nurses to even ambulate to the bathroom at this time.  Chest x-ray reviewed by me is unremarkable for acute pathology.  CBC independently reviewed and interpreted by me and shows no critical abnormalities.  CMP independently reviewed and interpreted by me and shows no critical abnormalities.  Patient case has been discussed with the hospitalist team who will admit to the hospital for further evaluation and continuation of treatment.                       Patient Care Considerations:    CT HEAD: I considered ordering a noncontrast CT of the head, however recent head CT that was unremarkable, no indication for repeat stroke workup      Consultants/Shared Management Plan:    Hospitalist: I have discussed the case with Dr. Lu who agrees to accept the patient for admission.    Social Determinants of Health:    Patient is independent, reliable, and has access to care.       Disposition and Care Coordination:    Admit:   Through independent evaluation of the patient's history, physical, and imperical data, the patient meets criteria for inpatient admission to the hospital.        Final diagnoses:   Urinary tract infection without hematuria, site unspecified   Vertigo        ED Disposition       ED Disposition   Decision to Admit    Condition   --    Comment   Level of Care: Telemetry [5]   Diagnosis: Dizziness [[id number]]   Admitting Physician: JENNIFER LU [[id number]]   Attending Physician: JENNIFER LU [[id number]]                 This medical record  created using voice recognition software.             Jose Luis Raymundo MD  12/20/24 [id number]

## 2024-12-21 NOTE — THERAPY EVALUATION
Acute Care - Physical Therapy Initial Evaluation   [place]     Patient Name: Toney Neri  : 1935  MRN: [id number]  Today's Date: 2024      Visit Dx:     ICD-10-CM ICD-9-CM   1. Urinary tract infection without hematuria, site unspecified  N39.0 599.0   2. Vertigo  R42 780.4   3. Difficulty in walking  R26.2 719.7     Patient Active Problem List   Diagnosis    Malignant neoplasm of urinary bladder    Permanent atrial fibrillation    Hypertension, essential    Hyperlipemia, mixed    Allergic rhinitis    Arthritis    Benign prostatic hyperplasia    Type 2 diabetes mellitus    Gastroesophageal reflux disease    Sleep apnea    Umbilical hernia    Urothelial carcinoma of bladder    Generalized weakness    Hypokalemia    Hypomagnesemia    Unsteady gait    Dizziness    TIA (transient ischemic attack)     Past Medical History:   Diagnosis Date    Acid reflux     Arthritis     Atrial fibrillation     CHALLAPPA FOLLOWS PT,    Cancer     Coronary artery disease     NO INTERVENTION    Diabetes mellitus, type 2     METFORMIN    Elevated PSA     Former smoker     INHALERS: TRELEGY, ALBUTEROL, NON SPECIFIC LUNG HX    High cholesterol     Hypertension     Limb swelling     Myocardial infarction     NO INTERVENTION, APPROX     Skin lesion     RECENT SKIN CA REMOVED FROM FACE    Sleep apnea     NO CPAP    Urinary stream slowing      Past Surgical History:   Procedure Laterality Date    ANKLE SURGERY Right     ankle repair    BICEPS TENDON REPAIR Right     COLONOSCOPY      CYSTOSCOPY      CYSTOSCOPY RETROGRADE PYELOGRAM Bilateral 2022    Procedure: CYSTOSCOPY RETROGRADE PYELOGRAM;  Surgeon: Yoli Le MD;  Location: [facility];  Service: Urology;  Laterality: Bilateral;    CYSTOSCOPY RETROGRADE PYELOGRAM Bilateral 3/21/2023    Procedure: CYSTOSCOPY RETROGRADE PYELOGRAM;  Surgeon: Yoli Le MD;  Location: [facility] MAIN OR;  Service: Urology;  Laterality: Bilateral;    CYSTOSCOPY  RETROGRADE PYELOGRAM Bilateral 10/10/2024    Procedure: CYSTOSCOPY RETROGRADE PYELOGRAM;  Surgeon: Yoli Le MD;  Location: [facility] MAIN OR;  Service: Urology;  Laterality: Bilateral;    ENDOSCOPY  2018 2020    GALLBLADDER SURGERY      ROTATOR CUFF REPAIR Bilateral 1988 1990    TONSILLECTOMY      TRANSURETHRAL RESECTION OF BLADDER TUMOR N/A 08/19/2021    Procedure: CYSTOSCOPY TRANSURETHRAL RESECTION OF BLADDER TUMOR;  Surgeon: Yoli Le MD;  Location: [facility] MAIN OR;  Service: Urology;  Laterality: N/A;    TRANSURETHRAL RESECTION OF BLADDER TUMOR N/A 4/26/2022    Procedure: CYSTOSCOPY TRANSURETHRAL RESECTION OF BLADDER TUMOR;  Surgeon: Yoli Le MD;  Location: [facility] MAIN OR;  Service: Urology;  Laterality: N/A;    TRANSURETHRAL RESECTION OF BLADDER TUMOR N/A 11/10/2022    Procedure: CYSTOSCOPY TRANSURETHRAL RESECTION OF BLADDER TUMOR;  Surgeon: Yoli Le MD;  Location: [facility] MAIN OR;  Service: Urology;  Laterality: N/A;    TRANSURETHRAL RESECTION OF BLADDER TUMOR N/A 3/21/2023    Procedure: CYSTOSCOPY TRANSURETHRAL RESECTION OF BLADDER TUMOR;  Surgeon: Yoli Le MD;  Location: [facility] MAIN OR;  Service: Urology;  Laterality: N/A;     PT Assessment (Last 12 Hours)       PT Evaluation and Treatment       Row Name 12/21/24 1251 12/21/24 1217       Physical Therapy Time and Intention    Subjective Information -- no complaints  -TC    Document Type evaluation  -TC evaluation  -TC    Mode of Treatment individual therapy;physical therapy  -TC individual therapy;physical therapy  -TC    Patient Effort good  -TC good  -TC      Row Name 12/21/24 1251 12/21/24 1217       General Information    Patient Profile Reviewed yes  -TC yes  -TC    Patient Observations -- alert;cooperative;agree to therapy  -TC    Prior Level of Function -- independent:;gait;transfer;bed mobility;ADL's  -TC    Existing Precautions/Restrictions no known precautions/restrictions  -TC no known  precautions/restrictions  -TC    Barriers to Rehab -- none identified  -TC      Row Name 12/21/24 1251 12/21/24 1217       Living Environment    Current Living Arrangements -- home  -TC    People in Home -- alone  -TC    Primary Care Provided by self  -TC self  -TC      Row Name 12/21/24 1251 12/21/24 1217       Cognition    Orientation Status (Cognition) oriented x 3  -TC oriented x 3  -TC      Row Name 12/21/24 1251 12/21/24 1217       Range of Motion Comprehensive    General Range of Motion bilateral lower extremity ROM WFL  -TC bilateral lower extremity ROM WFL  -TC      Row Name 12/21/24 1217          Strength (Manual Muscle Testing)    Strength (Manual Muscle Testing) bilateral lower extremities;strength is WFL  -TC       Row Name 12/21/24 1251 12/21/24 1217       Bed Mobility    Bed Mobility supine-sit-supine  -TC supine-sit-supine  -TC    Supine-Sit-Supine [place] (Bed Mobility) independent  -TC independent  -TC      Row Name 12/21/24 1251 12/21/24 1217       Transfers    Transfers sit-stand transfer  -TC sit-stand transfer  -TC      Row Name 12/21/24 1251 12/21/24 1217       Sit-Stand Transfer    Sit-Stand [place] (Transfers) independent  -TC independent  -TC      Row Name 12/21/24 1251 12/21/24 1217       Gait/Stairs (Locomotion)    Gait/Stairs Locomotion gait/ambulation independence  -TC gait/ambulation independence  -TC    [place] Level (Gait) modified independence  -TC modified independence  -TC      Row Name 12/21/24 1217          Balance    Balance Assessment standing dynamic balance  -TC     Dynamic Standing Balance supervision  -TC       Row Name 12/21/24 1217          Vestibular Evaluation/Treatment    Vestibular Evaluation Performed yes  -TC       Row Name 12/21/24 1217          Vestibular Symptoms/Subjective Complaints    Vestibular Symptoms/Complaints dizziness  -     Dizziness Rating 1  -TC       Row Name 12/21/24 1217          Oculomotor Exam    Nystagmus, Spontaneous negative   -TC     Nystagmus, Gaze-Evoked negative  -TC     Saccades, Horizontal normal  -TC     Saccades, Vertical normal  -TC     Smooth Pursuits normal  -TC     Accommodation normal  -TC       Row Name 12/21/24 1217          Vestibular Treatment    Canalith Repositioning Maneuver Epley  -TC     Canalith Repositioning Attempts 1  -TC     Canalith Repositioning Results other (see comments)  -TC     Comments, Canalith Repositioning Techniques/Results Pt reports that dizziness/ lightheadedness was present at start and finish of Epley.  -TC     Comments, Vestibular Stimulation Tolerance PT educated pt on not performing excessive cervical flexion or extension throughout the day to limit feeling of dizziness/lightheadness.  -TC       Row Name 12/21/24 1251 12/21/24 1217       Plan of Care Review    Plan of Care Reviewed With patient  -TC patient  -TC    Outcome Evaluation Epley maneuver performed. Negative toward left. Positive toward right with minimal report of dizziness, no observable nystagmus in either direction. Pt continues to report dizziness/lightheadness at rest but is able to walk independently and perform transfers independently. PT cautioned pt on performing excessive cervical flexion/extension activities to limit risk of increased dizziness. PT recommends outpatient PT services s/p discharge.  -TC --      Row Name 12/21/24 1217          Positioning and Restraints    Pre-Treatment Position sitting in chair/recliner  -TC     Post Treatment Position chair  -TC       Row Name 12/21/24 1251 12/21/24 1217       Therapy Assessment/Plan (PT)    Criteria for Skilled Interventions Met (PT) no;skilled treatment is necessary  -TC no;skilled treatment is necessary  -TC    Therapy Frequency (PT) evaluation only  -TC evaluation only  -TC      Row Name 12/21/24 1217          PT Evaluation Complexity    History, PT Evaluation Complexity no personal factors and/or comorbidities  -TC     Examination of Body Systems (PT Eval  Complexity) total of 4 or more elements  -TC     Clinical Presentation (PT Evaluation Complexity) stable  -TC     Clinical Decision Making (PT Evaluation Complexity) low complexity  -TC     Overall Complexity (PT Evaluation Complexity) low complexity  -TC       Row Name 12/21/24 1251 12/21/24 1217       Physical Therapy Goals    Problem Specific Goal Selection (PT) problem specific goal 1, PT  -TC problem specific goal 1, PT  -TC      Row Name 12/21/24 1251 12/21/24 1217       Problem Specific Goal 1 (PT)    Problem Specific Goal 1 (PT) Complete PT evaluation.  -TC Complete PT evaluation.  -TC    Time Frame (Problem Specific Goal 1, PT) 1 day  -TC 1 day  -TC    Progress/Outcome (Problem Specific Goal 1, PT) goal met  -TC goal met  -TC              User Key  (r) = Recorded By, (t) = Taken By, (c) = Cosigned By      Initials Name Provider Type    TC Sona Martinez, PT Physical Therapist                    Physical Therapy Education       Title: PT OT SLP Therapies (Done)       Topic: Physical Therapy (Done)       Point: Mobility training (Done)       Learning Progress Summary            Patient Acceptance, E, VU by TC at 12/21/2024 1254    Acceptance, E, VU by TC at 12/21/2024 1254                      Point: Home exercise program (Done)       Learning Progress Summary            Patient Acceptance, E, VU by TC at 12/21/2024 1254    Acceptance, E, VU by TC at 12/21/2024 1254                      Point: Body mechanics (Done)       Learning Progress Summary            Patient Acceptance, E, VU by TC at 12/21/2024 1254    Acceptance, E, VU by TC at 12/21/2024 1254                      Point: Precautions (Done)       Learning Progress Summary            Patient Acceptance, E, VU by TC at 12/21/2024 1254    Acceptance, E, VU by TC at 12/21/2024 1254                                      User Key       Initials Effective Dates Name Provider Type Discipline    TC 06/18/24 -  Sona Martinez, KARENA Physical Therapist PT                   PT Recommendation and Plan  Anticipated Discharge Disposition (PT): home with outpatient therapy services  Therapy Frequency (PT): evaluation only  Plan of Care Reviewed With: patient  Outcome Evaluation: Epley maneuver performed. Negative toward left. Positive toward right with minimal report of dizziness, no observable nystagmus in either direction. Pt continues to report dizziness/lightheadness at rest but is able to walk independently and perform transfers independently. PT cautioned pt on performing excessive cervical flexion/extension activities to limit risk of increased dizziness. PT recommends outpatient PT services s/p discharge.   Outcome Measures       Row Name 12/21/24 1200             How much help from another person do you currently need...    Turning from your back to your side while in flat bed without using bedrails? 4  -TC      Moving from lying on back to sitting on the side of a flat bed without bedrails? 4  -TC      Moving to and from a bed to a chair (including a wheelchair)? 4  -TC      Standing up from a chair using your arms (e.g., wheelchair, bedside chair)? 4  -TC      Climbing 3-5 steps with a railing? 3  -TC      To walk in hospital room? 4  -TC      AM-PAC 6 Clicks Score (PT) 23  -TC         Functional Assessment    Outcome Measure Options AM-PAC 6 Clicks Basic Mobility (PT)  -TC                User Key  (r) = Recorded By, (t) = Taken By, (c) = Cosigned By      Initials Name Provider Type    Sona Cotton, PT Physical Therapist                     Time Calculation:    PT Charges       Row Name 12/21/24 3635             Time Calculation    PT Received On 12/21/24  -TC         Untimed Charges    PT Eval/Re-eval Minutes 60  -TC         Total Minutes    Untimed Charges Total Minutes 60  -TC       Total Minutes 60  -TC                User Key  (r) = Recorded By, (t) = Taken By, (c) = Cosigned By      Initials Name Provider Type    Sona Cotton, PT Physical  Therapist                  Therapy Charges for Today       Code Description Service Date Service Provider Modifiers Qty    01688643387 HC PT EVAL LOW COMPLEXITY 4 12/21/2024 Sona Martinez, PT GP 1            PT G-Codes  Outcome Measure Options: AM-PAC 6 Clicks Basic Mobility (PT)  AM-PAC 6 Clicks Score (PT): 23    Sona Martinez, PT  12/21/2024

## 2024-12-21 NOTE — PLAN OF CARE
Goal Outcome Evaluation:  Plan of Care Reviewed With: patient        Progress: improving  Outcome Evaluation: VSS. UOP. Up standby assist. No c/o pain. Discharge home today.

## 2024-12-21 NOTE — ED NOTES
Pt transported to inpatient unit on ED stretcher in fair, stable condition by Tricia COSME ED Tech

## 2024-12-21 NOTE — PLAN OF CARE
Goal Outcome Evaluation:  Plan of Care Reviewed With: patient           Outcome Evaluation: Epley maneuver performed. Negative toward left. Positive toward right with minimal report of dizziness, no observable nystagmus in either direction. Pt continues to report dizziness/lightheadness at rest but is able to walk independently and perform transfers independently. PT cautioned pt on performing excessive cervical flexion/extension activities to limit risk of increased dizziness. PT recommends outpatient PT services s/p discharge.    Anticipated Discharge Disposition (PT): home

## 2024-12-21 NOTE — PLAN OF CARE
Goal Outcome Evaluation:   Pt was admitted last night for the same symptoms he was recently d/c with earlier in the week. A&Ox4. Assist of 1 to the restroom. Tolerating food. Refused bed alarm.  MRI still needed.

## 2024-12-21 NOTE — THERAPY EVALUATION
Acute Care - Physical Therapy Initial Evaluation   [place]     Patient Name: Toney Neri  : 1935  MRN: [id number]  Today's Date: 2024      Visit Dx:     ICD-10-CM ICD-9-CM   1. Urinary tract infection without hematuria, site unspecified  N39.0 599.0   2. Vertigo  R42 780.4   3. Difficulty in walking  R26.2 719.7     Patient Active Problem List   Diagnosis    Malignant neoplasm of urinary bladder    Permanent atrial fibrillation    Hypertension, essential    Hyperlipemia, mixed    Allergic rhinitis    Arthritis    Benign prostatic hyperplasia    Type 2 diabetes mellitus    Gastroesophageal reflux disease    Sleep apnea    Umbilical hernia    Urothelial carcinoma of bladder    Generalized weakness    Hypokalemia    Hypomagnesemia    Unsteady gait    Dizziness    TIA (transient ischemic attack)     Past Medical History:   Diagnosis Date    Acid reflux     Arthritis     Atrial fibrillation     CHALLAPPA FOLLOWS PT,    Cancer     Coronary artery disease     NO INTERVENTION    Diabetes mellitus, type 2     METFORMIN    Elevated PSA     Former smoker     INHALERS: TRELEGY, ALBUTEROL, NON SPECIFIC LUNG HX    High cholesterol     Hypertension     Limb swelling     Myocardial infarction     NO INTERVENTION, APPROX     Skin lesion     RECENT SKIN CA REMOVED FROM FACE    Sleep apnea     NO CPAP    Urinary stream slowing      Past Surgical History:   Procedure Laterality Date    ANKLE SURGERY Right     ankle repair    BICEPS TENDON REPAIR Right     COLONOSCOPY      CYSTOSCOPY      CYSTOSCOPY RETROGRADE PYELOGRAM Bilateral 2022    Procedure: CYSTOSCOPY RETROGRADE PYELOGRAM;  Surgeon: Yoli Le MD;  Location: [facility];  Service: Urology;  Laterality: Bilateral;    CYSTOSCOPY RETROGRADE PYELOGRAM Bilateral 3/21/2023    Procedure: CYSTOSCOPY RETROGRADE PYELOGRAM;  Surgeon: Yoli Le MD;  Location: [facility] MAIN OR;  Service: Urology;  Laterality: Bilateral;    CYSTOSCOPY  RETROGRADE PYELOGRAM Bilateral 10/10/2024    Procedure: CYSTOSCOPY RETROGRADE PYELOGRAM;  Surgeon: Yoli Le MD;  Location: [facility] MAIN OR;  Service: Urology;  Laterality: Bilateral;    ENDOSCOPY  2018 2020    GALLBLADDER SURGERY      ROTATOR CUFF REPAIR Bilateral 1988 1990    TONSILLECTOMY      TRANSURETHRAL RESECTION OF BLADDER TUMOR N/A 08/19/2021    Procedure: CYSTOSCOPY TRANSURETHRAL RESECTION OF BLADDER TUMOR;  Surgeon: Yoli Le MD;  Location: [facility] MAIN OR;  Service: Urology;  Laterality: N/A;    TRANSURETHRAL RESECTION OF BLADDER TUMOR N/A 4/26/2022    Procedure: CYSTOSCOPY TRANSURETHRAL RESECTION OF BLADDER TUMOR;  Surgeon: Yoli Le MD;  Location: [facility] MAIN OR;  Service: Urology;  Laterality: N/A;    TRANSURETHRAL RESECTION OF BLADDER TUMOR N/A 11/10/2022    Procedure: CYSTOSCOPY TRANSURETHRAL RESECTION OF BLADDER TUMOR;  Surgeon: Yoli Le MD;  Location: [facility] MAIN OR;  Service: Urology;  Laterality: N/A;    TRANSURETHRAL RESECTION OF BLADDER TUMOR N/A 3/21/2023    Procedure: CYSTOSCOPY TRANSURETHRAL RESECTION OF BLADDER TUMOR;  Surgeon: Yoli Le MD;  Location: [facility] MAIN OR;  Service: Urology;  Laterality: N/A;     PT Assessment (Last 12 Hours)       PT Evaluation and Treatment       Row Name 12/21/24 1251 12/21/24 1217       Physical Therapy Time and Intention    Subjective Information -- no complaints  -TC    Document Type evaluation  -TC evaluation  -TC    Mode of Treatment individual therapy;physical therapy  -TC individual therapy;physical therapy  -TC    Patient Effort good  -TC good  -TC      Row Name 12/21/24 1251 12/21/24 1217       General Information    Patient Profile Reviewed yes  -TC yes  -TC    Patient Observations -- alert;cooperative;agree to therapy  -TC    Prior Level of Function -- independent:;gait;transfer;bed mobility;ADL's  -TC    Existing Precautions/Restrictions no known precautions/restrictions  -TC no known  precautions/restrictions  -TC    Barriers to Rehab -- none identified  -TC      Row Name 12/21/24 1251 12/21/24 1217       Living Environment    Current Living Arrangements -- home  -TC    People in Home -- alone  -TC    Primary Care Provided by self  -TC self  -TC      Row Name 12/21/24 1251 12/21/24 1217       Cognition    Orientation Status (Cognition) oriented x 3  -TC oriented x 3  -TC      Row Name 12/21/24 1251 12/21/24 1217       Range of Motion Comprehensive    General Range of Motion bilateral lower extremity ROM WFL  -TC bilateral lower extremity ROM WFL  -TC      Row Name 12/21/24 1217          Strength (Manual Muscle Testing)    Strength (Manual Muscle Testing) bilateral lower extremities;strength is WFL  -TC       Row Name 12/21/24 1251 12/21/24 1217       Bed Mobility    Bed Mobility supine-sit-supine  -TC supine-sit-supine  -TC    Supine-Sit-Supine Dickson (Bed Mobility) independent  -TC independent  -TC      Row Name 12/21/24 1251 12/21/24 1217       Transfers    Transfers sit-stand transfer  -TC sit-stand transfer  -TC      Row Name 12/21/24 1251 12/21/24 1217       Sit-Stand Transfer    Sit-Stand Dickson (Transfers) independent  -TC independent  -TC      Row Name 12/21/24 1251 12/21/24 1217       Gait/Stairs (Locomotion)    Gait/Stairs Locomotion gait/ambulation independence  -TC gait/ambulation independence  -TC    Dickson Level (Gait) modified independence  -TC modified independence  -TC      Row Name 12/21/24 1217          Balance    Balance Assessment standing dynamic balance  -TC     Dynamic Standing Balance supervision  -TC       Row Name 12/21/24 1217          Vestibular Evaluation/Treatment    Vestibular Evaluation Performed yes  -TC       Row Name 12/21/24 1217          Vestibular Symptoms/Subjective Complaints    Vestibular Symptoms/Complaints dizziness  -     Dizziness Rating 1  -TC       Row Name 12/21/24 1217          Oculomotor Exam    Nystagmus, Spontaneous negative   -TC     Nystagmus, Gaze-Evoked negative  -TC     Saccades, Horizontal normal  -TC     Saccades, Vertical normal  -TC     Smooth Pursuits normal  -TC     Accommodation normal  -TC       Row Name 12/21/24 1255          Positional Testing    Quinter Hallpike, Left negative  -TC     Jordon Hallpike, Right positive  -TC     Symptoms with Testing Little to no nystagmus observed, symptoms of dizziness reported only with Quinter Hallpike toward the right.  -TC       Row Name 12/21/24 1217          Vestibular Treatment    Canalith Repositioning Maneuver Epley  -TC     Canalith Repositioning Attempts 1  -TC     Canalith Repositioning Results other (see comments)  -TC     Comments, Canalith Repositioning Techniques/Results Pt reports that dizziness/ lightheadedness was present at start and finish of Epley.  -TC     Comments, Vestibular Stimulation Tolerance PT educated pt on not performing excessive cervical flexion or extension throughout the day to limit feeling of dizziness/lightheadness.  -TC       Row Name 12/21/24 1251 12/21/24 1217       Plan of Care Review    Plan of Care Reviewed With patient  -TC patient  -TC    Outcome Evaluation Dixhall pike negative toward left, Positive toward right with minimal report of dizziness, no observable nystagmus in either direction. Epley maneuver performed. Pt continues to report subtle dizziness/lightheadness at rest but is able to walk independently and perform transfers independently. PT cautioned pt on performing excessive cervical flexion/extension activities to limit risk of increased dizziness. PT recommends outpatient PT services s/p discharge.  -TC --      Row Name 12/21/24 1217          Positioning and Restraints    Pre-Treatment Position sitting in chair/recliner  -TC     Post Treatment Position chair  -TC       Row Name 12/21/24 1251 12/21/24 1217       Therapy Assessment/Plan (PT)    Criteria for Skilled Interventions Met (PT) no;skilled treatment is necessary  -TC no;skilled  treatment is necessary  -TC    Therapy Frequency (PT) evaluation only  -TC evaluation only  -TC      Row Name 12/21/24 1217          PT Evaluation Complexity    History, PT Evaluation Complexity no personal factors and/or comorbidities  -TC     Examination of Body Systems (PT Eval Complexity) total of 4 or more elements  -TC     Clinical Presentation (PT Evaluation Complexity) stable  -TC     Clinical Decision Making (PT Evaluation Complexity) low complexity  -TC     Overall Complexity (PT Evaluation Complexity) low complexity  -TC       Row Name 12/21/24 1251 12/21/24 1217       Physical Therapy Goals    Problem Specific Goal Selection (PT) problem specific goal 1, PT  -TC problem specific goal 1, PT  -TC      Row Name 12/21/24 1251 12/21/24 1217       Problem Specific Goal 1 (PT)    Problem Specific Goal 1 (PT) Complete PT evaluation.  -TC Complete PT evaluation.  -TC    Time Frame (Problem Specific Goal 1, PT) 1 day  -TC 1 day  -TC    Progress/Outcome (Problem Specific Goal 1, PT) goal met  -TC goal met  -TC              User Key  (r) = Recorded By, (t) = Taken By, (c) = Cosigned By      Initials Name Provider Type    TC Sona Martinez, PT Physical Therapist                    Physical Therapy Education       Title: PT OT SLP Therapies (Done)       Topic: Physical Therapy (Done)       Point: Mobility training (Done)       Learning Progress Summary            Patient Acceptance, E, VU by TC at 12/21/2024 1254    Acceptance, E, VU by TC at 12/21/2024 1254                      Point: Home exercise program (Done)       Learning Progress Summary            Patient Acceptance, E, VU by TC at 12/21/2024 1254    Acceptance, E, VU by TC at 12/21/2024 1254                      Point: Body mechanics (Done)       Learning Progress Summary            Patient Acceptance, E, VU by TC at 12/21/2024 1254    Acceptance, E, VU by TC at 12/21/2024 1254                      Point: Precautions (Done)       Learning Progress Summary             Patient Acceptance, E, VU by TC at 12/21/2024 1254    Acceptance, E, VU by TC at 12/21/2024 1254                                      User Key       Initials Effective Dates Name Provider Type Discipline    TC 06/18/24 -  Sona Martinez, PT Physical Therapist PT                  PT Recommendation and Plan  Anticipated Discharge Disposition (PT): home with outpatient therapy services  Therapy Frequency (PT): evaluation only  Plan of Care Reviewed With: patient  Outcome Evaluation: Dixhall pike negative toward left, Positive toward right with minimal report of dizziness, no observable nystagmus in either direction. Epley maneuver performed. Pt continues to report subtle dizziness/lightheadness at rest but is able to walk independently and perform transfers independently. PT cautioned pt on performing excessive cervical flexion/extension activities to limit risk of increased dizziness. PT recommends outpatient PT services s/p discharge.   Outcome Measures       Row Name 12/21/24 1200             How much help from another person do you currently need...    Turning from your back to your side while in flat bed without using bedrails? 4  -TC      Moving from lying on back to sitting on the side of a flat bed without bedrails? 4  -TC      Moving to and from a bed to a chair (including a wheelchair)? 4  -TC      Standing up from a chair using your arms (e.g., wheelchair, bedside chair)? 4  -TC      Climbing 3-5 steps with a railing? 3  -TC      To walk in hospital room? 4  -TC      AM-PAC 6 Clicks Score (PT) 23  -TC         Functional Assessment    Outcome Measure Options AM-PAC 6 Clicks Basic Mobility (PT)  -TC                User Key  (r) = Recorded By, (t) = Taken By, (c) = Cosigned By      Initials Name Provider Type    TC Sona Martinez, PT Physical Therapist                     Time Calculation:    PT Charges       Row Name 12/21/24 8711             Time Calculation    PT Received On 12/21/24  -JUAN          Untimed Charges    PT Eval/Re-eval Minutes 60  -TC         Total Minutes    Untimed Charges Total Minutes 60  -TC       Total Minutes 60  -TC                User Key  (r) = Recorded By, (t) = Taken By, (c) = Cosigned By      Initials Name Provider Type    Sona Cotton, PT Physical Therapist                  Therapy Charges for Today       Code Description Service Date Service Provider Modifiers Qty    58381083239 HC PT EVAL LOW COMPLEXITY 4 12/21/2024 Sona Martinez, PT GP 1            PT G-Codes  Outcome Measure Options: AM-PAC 6 Clicks Basic Mobility (PT)  AM-PAC 6 Clicks Score (PT): 23    Sona Martinez PT  12/21/2024

## 2024-12-21 NOTE — DISCHARGE SUMMARY
[facility]         HOSPITALIST  DISCHARGE SUMMARY    Patient Name: Toney Neri  : 1935  MRN: [id number]    Date of Admission: 2024  Date of Discharge:  2024    Primary Care Physician: Una Neves APRN    Consults       Date and Time Order Name Status Description    2024 10:04 PM Inpatient Hospitalist Consult      2024 11:13 PM Inpatient Neurology Consult Stroke Completed             Active and Resolved Hospital Problems:  Active Hospital Problems    Diagnosis POA    **Dizziness [R42] Yes      Resolved Hospital Problems   No resolved problems to display.       Hospital Course     Hospital Course:  Toney Neri is a 89 y.o. male with past medical history of hypertension, hyperlipidemia, CAD, atrial fibrillation, diabetes who presents to the emergency department for evaluation of dizziness. Patient was recently admitted for dizziness and stroke workup was completed and negative.     Patient admitted for dizziness overnight.  Review of recent inpatient stay shows normal MRI.  PT evaluated him and he had a positive Jordon Hallpike maneuver on his left ear with rotational nystagmus indicating BPPV.  He was treated with Epley's remover 3 times with improvement.  Patient had no new neurologic symptoms, denying unilateral weakness or numbness, normal neuro exam.  Discussed repeating MRI with patient as I feel it is low yield, patient would like to defer repeat MRI.  Discussed with him that he would need continued PT outpatient for treatment.  Home PT was set up on recent discharge but he has not yet started his therapy.  Nursing to go over Epley maneuver and treatment exercises that he can do at home to help dizziness.  Vitamin D level was checked, he should follow-up with PCP for final results.    Patient discharged in stable condition.    DISCHARGE Follow Up Recommendations for labs and diagnostics: Follow-up with PCP in 1 week.      Day of Discharge     Vital  Signs:  Temp:  [97.7 °F (36.5 °C)-98.6 °F (37 °C)] 98.2 °F (36.8 °C)  Heart Rate:  [61-90] 90  Resp:  [16-19] 16  BP: (131-154)/(58-88) 144/88    Physical Exam:   Gen: NAD, Alert and Oriented  Cards: RRR, no murmur   Pulm: CTA b/l, no wheezing  Abd: soft, nondistended  Extremities: no pitting edema  Neuro: Cranial nerves II through X intact.  Strength is equal in bilateral upper and lower extremities.  Normal sensations bilaterally.      Discharge Details        Discharge Medications        Continue These Medications        Instructions Start Date   acetaminophen 325 MG tablet  Commonly known as: TYLENOL   325 mg, Daily      Airsupra 90-80 MCG/ACT aerosol  Generic drug: Albuterol-Budesonide   2 puffs, Every 4 Hours PRN      albuterol 1.25 MG/3ML nebulizer solution  Commonly known as: ACCUNEB   1.25 mg, Nebulization, Every 6 Hours PRN      albuterol sulfate  (90 Base) MCG/ACT inhaler  Commonly known as: PROVENTIL HFA;VENTOLIN HFA;PROAIR HFA   2 puffs, Every 4 Hours PRN      Aspirin Low Dose 81 MG EC tablet  Generic drug: aspirin   81 mg, Daily      atenolol 50 MG tablet  Commonly known as: TENORMIN   50 mg, Every Morning      carboxymethylcellulose 0.5 % solution  Commonly known as: REFRESH PLUS   1 drop, 4 Times Daily      Eliquis 5 MG tablet tablet  Generic drug: apixaban   5 mg, 2 times daily      finasteride 5 MG tablet  Commonly known as: PROSCAR   5 mg, Daily      hydroCHLOROthiazide 25 MG tablet   25 mg, Daily      loratadine 10 MG tablet  Commonly known as: CLARITIN   10 mg, Every Morning      Magnesium Oxide -Mg Supplement 400 (240 Mg) MG tablet   400 mg, Daily      meclizine 25 MG tablet  Commonly known as: ANTIVERT   25 mg, Oral, 3 times daily      Melatonin 3 MG capsule   3 mg, 2 Times Daily      metFORMIN 1000 MG tablet  Commonly known as: GLUCOPHAGE   1,000 mg, 2 Times Daily      multivitamins-minerals capsule capsule   1 capsule, 2 Times Daily      NIFEdipine CC 90 MG 24 hr tablet  Commonly  known as: ADALAT CC   90 mg, Daily      pantoprazole 40 MG EC tablet  Commonly known as: PROTONIX   40 mg, Daily      potassium chloride 10 MEQ CR tablet   10 mEq, Daily      simvastatin 40 MG tablet  Commonly known as: ZOCOR   40 mg, Nightly      tamsulosin 0.4 MG capsule 24 hr capsule  Commonly known as: FLOMAX   0.4 mg, Oral, Daily      terazosin 10 MG capsule  Commonly known as: HYTRIN   10 mg, Nightly      Trelegy Ellipta 200-62.5-25 MCG/ACT inhaler  Generic drug: Fluticasone-Umeclidin-Vilant   1 puff, Daily - RT      vitamin C 250 MG tablet  Commonly known as: ASCORBIC ACID   250 mg, Daily               No Known Allergies    Discharge Disposition:  Home or Self Care    Diet:  Hospital:  Diet Order   Procedures    Diet: Cardiac; Healthy Heart (2-3 Na+); Fluid Consistency: Thin (IDDSI 0)       Discharge Activity:       CODE STATUS:  Code Status and Medical Interventions: CPR (Attempt to Resuscitate); Full Support   Ordered at: 12/20/24 2222     Level Of Support Discussed With:    Patient     Code Status (Patient has no pulse and is not breathing):    CPR (Attempt to Resuscitate)     Medical Interventions (Patient has pulse or is breathing):    Full Support         Future Appointments   Date Time Provider Department Center   3/7/2025 11:30 AM Jacobo Sweet MD [facility]   4/16/2025  1:30 PM Yoli Le MD [facility] [place]       Additional Instructions for the Follow-ups that You Need to Schedule       Discharge Follow-up with PCP   As directed       Currently Documented PCP:    Una Neves APRN    PCP Phone Number:    [phone]     Follow Up Details: one week                Pertinent  and/or Most Recent Results         LAB RESULTS:      Lab 12/21/24  0403 12/20/24  1906 12/16/24  0445 12/15/24  0437   WBC 11.70* 12.26* 9.55 10.73   HEMOGLOBIN 11.8* 12.1* 11.7* 11.7*   HEMATOCRIT 37.8 38.0 36.7* 37.0*   PLATELETS 217 225 208 219   NEUTROS ABS 8.01* 7.97* 5.88 6.56   IMMATURE GRANS (ABS)  0.04 0.05 0.02 0.03   LYMPHS ABS 2.31 2.86 2.39 2.72   MONOS ABS 1.09* 1.13* 0.91* 1.01*   EOS ABS 0.19 0.19 0.31 0.35   MCV 96.4 97.4* 94.8 96.6         Lab 12/21/24  0403 12/20/24  2001 12/16/24  0445 12/15/24  0437   SODIUM 139 137 138 138   POTASSIUM 3.8 4.3 3.3* 3.4*   CHLORIDE 104 101 102 101   CO2 24.2 24.6 26.5 26.9   ANION GAP 10.8 11.4 9.5 10.1   BUN 18 21 24* 23   CREATININE 1.23 1.36* 1.19 1.26   EGFR 56.1* 49.7* 58.4* 54.5*   GLUCOSE 232* 181* 173* 174*   CALCIUM 8.8 8.6 9.2 9.1   MAGNESIUM  --  1.6 1.6 1.6   PHOSPHORUS  --   --  4.0 4.1         Lab 12/21/24  0403 12/20/24  2001 12/16/24  0445 12/15/24  0437   TOTAL PROTEIN 6.7 6.8 6.3 6.2   ALBUMIN 3.7 3.8 3.4* 3.4*   GLOBULIN 3.0 3.0 2.9 2.8   ALT (SGPT) 12 13 11 10   AST (SGOT) 16 21 16 16   BILIRUBIN 0.4 0.3 0.5 0.5   ALK PHOS 59 60 59 56                     Brief Urine Lab Results  (Last result in the past 365 days)        Color   Clarity   Blood   Leuk Est   Nitrite   Protein   CREAT   Urine HCG        12/20/24 1854 Dark Yellow   Clear   Negative   Small (1+)   Negative   30 mg/dL (1+)                 Microbiology Results (last 10 days)       ** No results found for the last 240 hours. **            XR Chest 1 View    Result Date: 12/20/2024  Impression: Radiographically stable appearance of the chest including cardiomegaly, possible trace left pleural effusion and chronic appearing interstitial change. Electronically Signed: Charli Zaragoza MD  12/20/2024 8:12 PM EST  Workstation ID: [id number]    CT Cervical Spine Without Contrast    Result Date: 12/13/2024     1. No acute findings. No acute vertebral compression fracture. 2. Degenerative changes are seen at multiple levels, as detailed above. 3. Please see above comments for further detail. Portions of this note were completed with a voice recognition program. Electronically Signed: Toney Loo MD  12/13/2024 9:24 PM EST  Workstation ID: [id number]            Labs Pending at Discharge:  Pending  Labs       Order Current Status    Vitamin D,25-Hydroxy In process              Time spent on Discharge including face to face service:  >30 minutes    Electronically signed by Miladys Lobato DO, 12/21/24, 12:10 PM EST.

## 2024-12-21 NOTE — H&P
[facility]   HISTORY AND PHYSICAL    Patient Name: Toney Neri  : 1935  MRN: [id number]  Primary Care Physician:  Una Neves APRN  Date of admission: 2024    Subjective   Subjective     Chief Complaint: Dizziness    History of Present Illness  Patient is a 89 y.o. year old male with past medical history of hypertension, hyperlipidemia, CAD, atrial fibrillation, diabetes who presents to the emergency department for evaluation of dizziness.  Patient was recently admitted for dizziness and stroke workup was completed and very thorough.  Stroke workup was unremarkable and patient started on meclizine with improvement.  He has been home and doing well and reports today he had recurrence of the vertigo and is unable to get around.      Review of Systems   Constitutional:  Negative for activity change, appetite change, chills, diaphoresis and fatigue.   HENT:  Negative for drooling, facial swelling, sneezing and sore throat.    Respiratory:  Negative for cough and shortness of breath.    Cardiovascular:  Negative for chest pain.   Gastrointestinal:  Negative for abdominal pain, constipation, diarrhea and nausea.   Endocrine: Negative for polydipsia and polyphagia.   Musculoskeletal:  Negative for joint swelling.   Neurological:  Negative for dizziness, weakness, numbness and headaches.   Psychiatric/Behavioral:  Negative for agitation.         Personal History     Past Medical History:   Diagnosis Date    Acid reflux     Arthritis     Atrial fibrillation     [place] FOLLOWS PT,    Cancer     Coronary artery disease     NO INTERVENTION    Diabetes mellitus, type 2     METFORMIN    Elevated PSA     Former smoker     INHALERS: TRELEGY, ALBUTEROL, NON SPECIFIC LUNG HX    High cholesterol     Hypertension     Limb swelling     Myocardial infarction     NO INTERVENTION, APPROX     Skin lesion     RECENT SKIN CA REMOVED FROM FACE    Sleep apnea     NO CPAP    Urinary stream slowing        Past  Surgical History:   Procedure Laterality Date    ANKLE SURGERY Right 1989    ankle repair    BICEPS TENDON REPAIR Right     COLONOSCOPY  2012    CYSTOSCOPY      CYSTOSCOPY RETROGRADE PYELOGRAM Bilateral 4/26/2022    Procedure: CYSTOSCOPY RETROGRADE PYELOGRAM;  Surgeon: oYli Le MD;  Location: [facility] MAIN OR;  Service: Urology;  Laterality: Bilateral;    CYSTOSCOPY RETROGRADE PYELOGRAM Bilateral 3/21/2023    Procedure: CYSTOSCOPY RETROGRADE PYELOGRAM;  Surgeon: Yoli Le MD;  Location: [facility] MAIN OR;  Service: Urology;  Laterality: Bilateral;    CYSTOSCOPY RETROGRADE PYELOGRAM Bilateral 10/10/2024    Procedure: CYSTOSCOPY RETROGRADE PYELOGRAM;  Surgeon: Yoli Le MD;  Location: [facility] MAIN OR;  Service: Urology;  Laterality: Bilateral;    ENDOSCOPY  2018 2020    GALLBLADDER SURGERY      ROTATOR CUFF REPAIR Bilateral 1988 1990    TONSILLECTOMY      TRANSURETHRAL RESECTION OF BLADDER TUMOR N/A 08/19/2021    Procedure: CYSTOSCOPY TRANSURETHRAL RESECTION OF BLADDER TUMOR;  Surgeon: Yoli Le MD;  Location: [facility] MAIN OR;  Service: Urology;  Laterality: N/A;    TRANSURETHRAL RESECTION OF BLADDER TUMOR N/A 4/26/2022    Procedure: CYSTOSCOPY TRANSURETHRAL RESECTION OF BLADDER TUMOR;  Surgeon: Yoli Le MD;  Location: [facility] MAIN OR;  Service: Urology;  Laterality: N/A;    TRANSURETHRAL RESECTION OF BLADDER TUMOR N/A 11/10/2022    Procedure: CYSTOSCOPY TRANSURETHRAL RESECTION OF BLADDER TUMOR;  Surgeon: Yoli Le MD;  Location: [facility] MAIN OR;  Service: Urology;  Laterality: N/A;    TRANSURETHRAL RESECTION OF BLADDER TUMOR N/A 3/21/2023    Procedure: CYSTOSCOPY TRANSURETHRAL RESECTION OF BLADDER TUMOR;  Surgeon: Yoli Le MD;  Location: [facility] MAIN OR;  Service: Urology;  Laterality: N/A;       Family History: family history includes Arthritis in his father and mother; Diabetes in an other family member; Heart disease in his mother; Prostate cancer in his  father and son. Otherwise pertinent FHx was reviewed and not pertinent to current issue.    Social History:  reports that he has quit smoking. His smoking use included cigarettes. He has never used smokeless tobacco. He reports current alcohol use. He reports that he does not use drugs.    Home Medications:  Albuterol-Budesonide, Fluticasone-Umeclidin-Vilant, Magnesium Oxide -Mg Supplement, Melatonin, NIFEdipine CC, acetaminophen, albuterol, albuterol sulfate HFA, apixaban, aspirin, atenolol, carboxymethylcellulose, finasteride, hydroCHLOROthiazide, loratadine, meclizine, metFORMIN, multivitamins-minerals, pantoprazole, potassium chloride, simvastatin, tamsulosin, terazosin, and vitamin C    Allergies:  No Known Allergies    Objective    Objective     Vitals:   Temp:  [98.6 °F (37 °C)] 98.6 °F (37 °C)  Heart Rate:  [82-83] 83  Resp:  [17-18] 17  BP: (131-139)/(63-74) 139/74    Physical Exam  Eyes:      Comments: Horizontal nystagmus bilaterally         Result Review    Result Review:  I have personally reviewed the results from the time of this admission to 12/20/2024 22:22 EST and agree with these findings:  []  Laboratory list / accordion  []  Microbiology  []  Radiology  []  EKG/Telemetry   []  Cardiology/Vascular   []  Pathology  []  Old records  []  Other:  Most notable findings include:      Assessment & Plan   Assessment / Plan     Brief Patient Summary:  Toney Neri is a 89 y.o. male who presented to the ED with complaints of dizziness.    Active Hospital Problems:  Active Hospital Problems    Diagnosis     **Dizziness      Plan:   Vertigo:  MRI brain pending  Admit to [facility]  Cardiac diet  Continue IV fluids normal saline at 100 mL/h  Continue with meclizine 25 mg 3 times daily  Consider consulting neurology if symptoms worsen  Consulted PT for eval and treatment  Daily CBC and CMP will continue to monitor  Will continue to monitor vitals    Hx of HTN:   Will continue home  medications    Hyperglycemia secondary to DMII:  Order insulin sliding scale  Order hypoglycemia protocol  Order A1c  Hold home medications for now  Consult nutritionist for diabetic education  Daily CMP, will continue to monitor    Hx of atrial fibrillation:   Will continue home medications     DVT prophylaxis: Lovenox  GI prophylaxis: Zofran, Protonix  CODE STATUS: CPR (full code)        VTE Prophylaxis:  Pharmacologic VTE prophylaxis orders are present.        CODE STATUS:    Level Of Support Discussed With: Patient  Code Status (Patient has no pulse and is not breathing): CPR (Attempt to Resuscitate)  Medical Interventions (Patient has pulse or is breathing): Full Support    Admission Status:  I believe this patient meets full code status.    Jeramy Lu MD

## 2024-12-21 NOTE — ED NOTES
"Pt reports that he was recently admitted for dizziness 12/12/24-12/16/24. Was d/c with rx for Meclizine to be taken twice a day. Patient states that he feels that it helps some but felt \"light headed and unstable\" today when standing. Pt denies any falls. Patient does not use any ambulatory aides when walking, lives by himself and is legally blind. Pt reports that he feels safe at home.   "

## 2024-12-21 NOTE — PLAN OF CARE
Goal Outcome Evaluation:  Plan of Care Reviewed With: patient           Outcome Evaluation: [place] p[place] negative toward left, Positive toward right with minimal report of dizziness, no observable nystagmus in either direction. Epley maneuver performed. Pt continues to report subtle dizziness/lightheadness at rest but is able to walk independently and perform transfers independently. PT cautioned pt on performing excessive cervical flexion/extension activities to limit risk of increased dizziness. PT recommends outpatient PT services s/p discharge.    Anticipated Discharge Disposition (PT): home with outpatient therapy services

## 2024-12-21 NOTE — PLAN OF CARE
Goal Outcome Evaluation:      Take appropriate pain medication based on pain.                                        Acitretin Counseling:  I discussed with the patient the risks of acitretin including but not limited to hair loss, dry lips/skin/eyes, liver damage, hyperlipidemia, depression/suicidal ideation, photosensitivity.  Serious rare side effects can include but are not limited to pancreatitis, pseudotumor cerebri, bony changes, clot formation/stroke/heart attack.  Patient understands that alcohol is contraindicated since it can result in liver toxicity and significantly prolong the elimination of the drug by many years.

## 2024-12-22 ENCOUNTER — READMISSION MANAGEMENT (OUTPATIENT)
Dept: CALL CENTER | Facility: HOSPITAL | Age: 89
End: 2024-12-22
Payer: MEDICARE

## 2024-12-22 NOTE — OUTREACH NOTE
Prep Survey      Flowsheet Row Responses   [facility] patient discharged from? [facility]   Is LACE score < 7 ? No   Eligibility Readm Mgmt   Discharge diagnosis Dizziness   Does the patient have one of the following disease processes/diagnoses(primary or secondary)? Other   Does the patient have Home health ordered? Yes   What is the Home health agency?  [facility]   Is there a DME ordered? No   Prep survey completed? Yes            NAMITA DUMONT - Registered Nurse

## 2024-12-23 LAB
QT INTERVAL: 412 MS
QTC INTERVAL: 474 MS

## 2024-12-26 LAB
QT INTERVAL: 421 MS
QTC INTERVAL: 491 MS

## 2024-12-31 ENCOUNTER — READMISSION MANAGEMENT (OUTPATIENT)
Dept: CALL CENTER | Facility: HOSPITAL | Age: 89
End: 2024-12-31
Payer: MEDICARE

## 2024-12-31 ENCOUNTER — HOSPITAL ENCOUNTER (EMERGENCY)
Facility: HOSPITAL | Age: 89
Discharge: HOME | End: 2024-12-31
Attending: EMERGENCY MEDICINE | Admitting: EMERGENCY MEDICINE
Payer: MEDICARE

## 2024-12-31 ENCOUNTER — APPOINTMENT (OUTPATIENT)
Dept: GENERAL RADIOLOGY | Facility: HOSPITAL | Age: 89
End: 2024-12-31
Payer: MEDICARE

## 2024-12-31 VITALS
OXYGEN SATURATION: 92 % | TEMPERATURE: 98 F | HEIGHT: 70 IN | BODY MASS INDEX: 38 KG/M2 | WEIGHT: 265.43 LBS | SYSTOLIC BLOOD PRESSURE: 142 MMHG | HEART RATE: 90 BPM | DIASTOLIC BLOOD PRESSURE: 78 MMHG | RESPIRATION RATE: 20 BRPM

## 2024-12-31 DIAGNOSIS — J20.9 ACUTE BRONCHITIS, UNSPECIFIED ORGANISM: Primary | ICD-10-CM

## 2024-12-31 LAB
ALBUMIN SERPL-MCNC: 3.9 G/DL (ref 3.5–5.2)
ALBUMIN/GLOB SERPL: 1.4 G/DL
ALP SERPL-CCNC: 57 U/L (ref 39–117)
ALT SERPL W P-5'-P-CCNC: 14 U/L (ref 1–41)
ANION GAP SERPL CALCULATED.3IONS-SCNC: 10.5 MMOL/L (ref 5–15)
AST SERPL-CCNC: 18 U/L (ref 1–40)
BASOPHILS # BLD AUTO: 0.07 10*3/MM3 (ref 0–0.2)
BASOPHILS NFR BLD AUTO: 0.7 % (ref 0–1.5)
BILIRUB SERPL-MCNC: 0.4 MG/DL (ref 0–1.2)
BUN SERPL-MCNC: 15 MG/DL (ref 8–23)
BUN/CREAT SERPL: 12.7 (ref 7–25)
CALCIUM SPEC-SCNC: 8.6 MG/DL (ref 8.6–10.5)
CHLORIDE SERPL-SCNC: 105 MMOL/L (ref 98–107)
CO2 SERPL-SCNC: 22.5 MMOL/L (ref 22–29)
CREAT SERPL-MCNC: 1.18 MG/DL (ref 0.76–1.27)
D DIMER PPP FEU-MCNC: 0.67 MCGFEU/ML (ref 0–0.89)
DEPRECATED RDW RBC AUTO: 48.5 FL (ref 37–54)
EGFRCR SERPLBLD CKD-EPI 2021: 59 ML/MIN/1.73
EOSINOPHIL # BLD AUTO: 0.22 10*3/MM3 (ref 0–0.4)
EOSINOPHIL NFR BLD AUTO: 2.1 % (ref 0.3–6.2)
ERYTHROCYTE [DISTWIDTH] IN BLOOD BY AUTOMATED COUNT: 13.7 % (ref 12.3–15.4)
GEN 5 1HR TROPONIN T REFLEX: 19 NG/L
GLOBULIN UR ELPH-MCNC: 2.7 GM/DL
GLUCOSE SERPL-MCNC: 174 MG/DL (ref 65–99)
HCT VFR BLD AUTO: 35.8 % (ref 37.5–51)
HGB BLD-MCNC: 11.4 G/DL (ref 13–17.7)
HOLD SPECIMEN: NORMAL
HOLD SPECIMEN: NORMAL
IMM GRANULOCYTES # BLD AUTO: 0.02 10*3/MM3 (ref 0–0.05)
IMM GRANULOCYTES NFR BLD AUTO: 0.2 % (ref 0–0.5)
LIPASE SERPL-CCNC: 43 U/L (ref 13–60)
LYMPHOCYTES # BLD AUTO: 2 10*3/MM3 (ref 0.7–3.1)
LYMPHOCYTES NFR BLD AUTO: 19.2 % (ref 19.6–45.3)
MAGNESIUM SERPL-MCNC: 1.5 MG/DL (ref 1.6–2.4)
MCH RBC QN AUTO: 30.9 PG (ref 26.6–33)
MCHC RBC AUTO-ENTMCNC: 31.8 G/DL (ref 31.5–35.7)
MCV RBC AUTO: 97 FL (ref 79–97)
MONOCYTES # BLD AUTO: 0.88 10*3/MM3 (ref 0.1–0.9)
MONOCYTES NFR BLD AUTO: 8.5 % (ref 5–12)
NEUTROPHILS NFR BLD AUTO: 69.3 % (ref 42.7–76)
NEUTROPHILS NFR BLD AUTO: 7.22 10*3/MM3 (ref 1.7–7)
NRBC BLD AUTO-RTO: 0 /100 WBC (ref 0–0.2)
NT-PROBNP SERPL-MCNC: 1917 PG/ML (ref 0–1800)
PLATELET # BLD AUTO: 220 10*3/MM3 (ref 140–450)
PMV BLD AUTO: 10.5 FL (ref 6–12)
POTASSIUM SERPL-SCNC: 3.9 MMOL/L (ref 3.5–5.2)
PROT SERPL-MCNC: 6.6 G/DL (ref 6–8.5)
RBC # BLD AUTO: 3.69 10*6/MM3 (ref 4.14–5.8)
SODIUM SERPL-SCNC: 138 MMOL/L (ref 136–145)
TROPONIN T NUMERIC DELTA: -2 NG/L
TROPONIN T SERPL HS-MCNC: 21 NG/L
WBC NRBC COR # BLD AUTO: 10.41 10*3/MM3 (ref 3.4–10.8)
WHOLE BLOOD HOLD COAG: NORMAL
WHOLE BLOOD HOLD SPECIMEN: NORMAL

## 2024-12-31 PROCEDURE — 85025 COMPLETE CBC W/AUTO DIFF WBC: CPT

## 2024-12-31 PROCEDURE — 83735 ASSAY OF MAGNESIUM: CPT

## 2024-12-31 PROCEDURE — 93005 ELECTROCARDIOGRAM TRACING: CPT | Performed by: EMERGENCY MEDICINE

## 2024-12-31 PROCEDURE — 94799 UNLISTED PULMONARY SVC/PX: CPT

## 2024-12-31 PROCEDURE — 80053 COMPREHEN METABOLIC PANEL: CPT

## 2024-12-31 PROCEDURE — 83690 ASSAY OF LIPASE: CPT

## 2024-12-31 PROCEDURE — 85379 FIBRIN DEGRADATION QUANT: CPT | Performed by: EMERGENCY MEDICINE

## 2024-12-31 PROCEDURE — 93005 ELECTROCARDIOGRAM TRACING: CPT

## 2024-12-31 PROCEDURE — 83880 ASSAY OF NATRIURETIC PEPTIDE: CPT

## 2024-12-31 PROCEDURE — 71045 X-RAY EXAM CHEST 1 VIEW: CPT

## 2024-12-31 PROCEDURE — 36415 COLL VENOUS BLD VENIPUNCTURE: CPT

## 2024-12-31 PROCEDURE — 99284 EMERGENCY DEPT VISIT MOD MDM: CPT

## 2024-12-31 PROCEDURE — 94640 AIRWAY INHALATION TREATMENT: CPT

## 2024-12-31 PROCEDURE — 84484 ASSAY OF TROPONIN QUANT: CPT

## 2024-12-31 RX ORDER — ALBUTEROL SULFATE 1.25 MG/3ML
1 SOLUTION RESPIRATORY (INHALATION) EVERY 6 HOURS PRN
Qty: 360 ML | Refills: 0 | Status: SHIPPED | OUTPATIENT
Start: 2024-12-31

## 2024-12-31 RX ORDER — SODIUM CHLORIDE 0.9 % (FLUSH) 0.9 %
10 SYRINGE (ML) INJECTION AS NEEDED
Status: DISCONTINUED | OUTPATIENT
Start: 2024-12-31 | End: 2024-12-31 | Stop reason: HOSPADM

## 2024-12-31 RX ORDER — ASPIRIN 81 MG/1
324 TABLET, CHEWABLE ORAL ONCE
Status: COMPLETED | OUTPATIENT
Start: 2024-12-31 | End: 2024-12-31

## 2024-12-31 RX ORDER — IPRATROPIUM BROMIDE AND ALBUTEROL SULFATE 2.5; .5 MG/3ML; MG/3ML
3 SOLUTION RESPIRATORY (INHALATION) ONCE
Status: COMPLETED | OUTPATIENT
Start: 2024-12-31 | End: 2024-12-31

## 2024-12-31 RX ORDER — DOXYCYCLINE 100 MG/1
100 CAPSULE ORAL 2 TIMES DAILY
Qty: 14 CAPSULE | Refills: 0 | Status: SHIPPED | OUTPATIENT
Start: 2024-12-31

## 2024-12-31 RX ADMIN — IPRATROPIUM BROMIDE AND ALBUTEROL SULFATE 3 ML: .5; 3 SOLUTION RESPIRATORY (INHALATION) at 19:56

## 2024-12-31 RX ADMIN — ASPIRIN 324 MG: 81 TABLET, CHEWABLE ORAL at 15:47

## 2024-12-31 NOTE — OUTREACH NOTE
Medical Week 1 Survey      Flowsheet Row Responses   [facility] patient discharged from? [facility]   Does the patient have one of the following disease processes/diagnoses(primary or secondary)? Other   Week 1 attempt successful? Yes   Call start time 0914   Call end time 0917   List who call center can speak with pt   Medication alerts for this patient no changes to medication list.   Comments regarding appointments Pt saw pcp last week.   Does the patient have a primary care provider?  Yes   Has the patient kept scheduled appointments due by today? Yes   Has home health visited the patient within 72 hours of discharge? Yes   Psychosocial issues? No   Did the patient receive a copy of their discharge instructions? Yes   What is the patient's perception of their health status since discharge? Improving   Is the patient/caregiver able to teach back signs and symptoms related to disease process for when to call PCP? Yes   Is the patient/caregiver able to teach back signs and symptoms related to disease process for when to call 911? Yes   Is the patient/caregiver able to teach back the hierarchy of who to call/visit for symptoms/problems? PCP, Specialist, Home health nurse, Urgent Care, ED, 911 Yes   Week 1 call completed? Yes   Would this patient benefit from a Referral to Amb Social Work? No   Is the patient interested in additional calls from an ambulatory ? No   Wrap up additional comments Pt reports doing ok. No changes to medication list. HH is visiting. Pt has had pcp f/u appointment.   Call end time 0917            Riddhi RIDDLE - Registered Nurse

## 2024-12-31 NOTE — ED PROVIDER NOTES
Time: 5:36 PM EST  Date of encounter:  12/31/2024  Independent Historian/Clinical History and Information was obtained by:   Patient    History is limited by: N/A    Chief Complaint: Chest pain      History of Present Illness:  Patient is a 89 y.o. year old male who presents to the emergency department for evaluation of chest pain starting today at 5:00 in the morning.  Patient was at rest at home.  Does voice some dyspnea as well.  Patient notes that it is the anniversary of his wife's death tomorrow but denies feeling overly anxious.  He has dealt with some lightheadedness and once again left hand tingling as was the case on his recent admission to the hospital.  When asked where his pain is, the patient points to his left upper quadrant region.      Patient Care Team  Primary Care Provider: Una Neves APRN    Past Medical History:     No Known Allergies  Past Medical History:   Diagnosis Date    Acid reflux     Arthritis     Atrial fibrillation     CHALLAPPA FOLLOWS PT,    Cancer     Coronary artery disease     NO INTERVENTION    Diabetes mellitus, type 2     METFORMIN    Elevated PSA     Former smoker     INHALERS: TRELEGY, ALBUTEROL, NON SPECIFIC LUNG HX    High cholesterol     Hypertension     Limb swelling     Myocardial infarction     NO INTERVENTION, APPROX 2010    Skin lesion     RECENT SKIN CA REMOVED FROM FACE    Sleep apnea     NO CPAP    Urinary stream slowing      Past Surgical History:   Procedure Laterality Date    ANKLE SURGERY Right 1989    ankle repair    BICEPS TENDON REPAIR Right     COLONOSCOPY  2012    CYSTOSCOPY      CYSTOSCOPY RETROGRADE PYELOGRAM Bilateral 4/26/2022    Procedure: CYSTOSCOPY RETROGRADE PYELOGRAM;  Surgeon: Yoli Le MD;  Location: [facility];  Service: Urology;  Laterality: Bilateral;    CYSTOSCOPY RETROGRADE PYELOGRAM Bilateral 3/21/2023    Procedure: CYSTOSCOPY RETROGRADE PYELOGRAM;  Surgeon: Yoli Le MD;  Location: [facility] OR;  Service:  Urology;  Laterality: Bilateral;    CYSTOSCOPY RETROGRADE PYELOGRAM Bilateral 10/10/2024    Procedure: CYSTOSCOPY RETROGRADE PYELOGRAM;  Surgeon: Yoli Le MD;  Location: [facility] MAIN OR;  Service: Urology;  Laterality: Bilateral;    ENDOSCOPY  2018 2020    GALLBLADDER SURGERY      ROTATOR CUFF REPAIR Bilateral 1988 1990    TONSILLECTOMY      TRANSURETHRAL RESECTION OF BLADDER TUMOR N/A 08/19/2021    Procedure: CYSTOSCOPY TRANSURETHRAL RESECTION OF BLADDER TUMOR;  Surgeon: Yoli Le MD;  Location: [facility] MA[facility] OR;  Service: Urology;  Laterality: N/A;    TRANSURETHRAL RESECTION OF BLADDER TUMOR N/A 4/26/2022    Procedure: CYSTOSCOPY TRANSURETHRAL RESECTION OF BLADDER TUMOR;  Surgeon: Yoli Le MD;  Location: [facility] OR;  Service: Urology;  Laterality: N/A;    TRANSURETHRAL RESECTION OF BLADDER TUMOR N/A 11/10/2022    Procedure: CYSTOSCOPY TRANSURETHRAL RESECTION OF BLADDER TUMOR;  Surgeon: Yoli Le MD;  Location: [facility] OR;  Service: Urology;  Laterality: N/A;    TRANSURETHRAL RESECTION OF BLADDER TUMOR N/A 3/21/2023    Procedure: CYSTOSCOPY TRANSURETHRAL RESECTION OF BLADDER TUMOR;  Surgeon: Yoli Le MD;  Location: [facility] OR;  Service: Urology;  Laterality: N/A;     Family History   Problem Relation Age of Onset    Heart disease Mother     Arthritis Mother     Prostate cancer Father     Arthritis Father     Prostate cancer Son     Diabetes Other     Malig Hyperthermia Neg Hx        Home Medications:  Prior to Admission medications    Medication Sig Start Date End Date Taking? Authorizing Provider   acetaminophen (TYLENOL) 325 MG tablet Take 1 tablet by mouth Daily. 4/21/21   Provider, MD Maame   albuterol (ACCUNEB) 1.25 MG/3ML nebulizer solution Take 3 mL by nebulization Every 6 (Six) Hours As Needed for Shortness of Air for up to 60 days. 12/16/24 2/14/25  Silvestre Root MD   albuterol sulfate  (90 Base) MCG/ACT inhaler Inhale 2 puffs  Every 4 (Four) Hours As Needed for Wheezing.    Maame Reyes MD   Albuterol-Budesonide (Airsupra) 90-80 MCG/ACT aerosol Inhale 2 puffs Every 4 (Four) Hours As Needed (for wheezing or shortness of air).    Maame Reyes MD   apixaban (Eliquis) 5 MG tablet tablet Take 1 tablet by mouth 2 (two) times a day. . 1/30/21   Maame Reyes MD   Aspirin Low Dose 81 MG EC tablet Take 1 tablet by mouth Daily. 4/21/21   Maame Reyes MD   atenolol (TENORMIN) 50 MG tablet Take 1 tablet by mouth Every Morning.    Maame Reyes MD   carboxymethylcellulose (REFRESH PLUS) 0.5 % solution Administer 1 drop to both eyes 4 (Four) Times a Day.    Maame Ryees MD   finasteride (PROSCAR) 5 MG tablet Take 1 tablet by mouth Daily. 7/26/22   Maame Reyes MD   hydroCHLOROthiazide 25 MG tablet Take 1 tablet by mouth Daily.    Maame Reyes MD   loratadine (CLARITIN) 10 MG tablet Take 1 tablet by mouth Every Morning. 10/20/21   Maame Reyes MD   Magnesium Oxide 400 (240 Mg) MG tablet Take 1 tablet by mouth Daily. 4/21/21   Mamae Reyes MD   meclizine (ANTIVERT) 25 MG tablet Take 1 tablet by mouth 3 times a day. 12/16/24   Silvestre Root MD   Melatonin 3 MG capsule Take 1 capsule by mouth 2 (Two) Times a Day.    Maame Reyes MD   metFORMIN (GLUCOPHAGE) 1000 MG tablet Take 1 tablet by mouth 2 (Two) Times a Day. 1/30/21   Maame Reyes MD   multivitamins-minerals (PRESERVISION AREDS 2) capsule capsule Take 1 capsule by mouth 2 (Two) Times a Day. 9/16/24   Maame Reyes MD   NIFEdipine CC (ADALAT CC) 90 MG 24 hr tablet Take 1 tablet by mouth Daily. 10/11/24   Maame Reyes MD   pantoprazole (PROTONIX) 40 MG EC tablet Take 1 tablet by mouth Daily.    Maame Reyes MD   potassium chloride 10 MEQ CR tablet Take 1 tablet by mouth Daily. 1/30/21   Maame Reyes MD   simvastatin (ZOCOR) 40 MG tablet Take 1 tablet by  "mouth Every Night.    Maame Reyes MD   tamsulosin (FLOMAX) 0.4 MG capsule 24 hr capsule Take 1 capsule by mouth Daily. 12/2/24   Yoli Le MD   terazosin (HYTRIN) 10 MG capsule Take 1 capsule by mouth Every Night.    Maame Reyes MD Trelegy Ellipta 200-62.5-25 MCG/ACT inhaler Inhale 1 puff Daily. 10/1/24   Maame Reyes MD   vitamin C (ASCORBIC ACID) 250 MG tablet Take 1 tablet by mouth Daily.    Maame Reyes MD        Social History:   Social History     Tobacco Use    Smoking status: Former     Types: Cigarettes    Smokeless tobacco: Never    Tobacco comments:     smoked 21-30 years   Vaping Use    Vaping status: Never Used   Substance Use Topics    Alcohol use: Yes     Comment: rarely drinks less than 1 drink per day has been drinking for 31 or more years    Drug use: Never         Review of Systems:  Review of Systems   Constitutional:  Negative for chills and fever.   HENT:  Negative for congestion, rhinorrhea and sore throat.    Eyes:  Negative for photophobia.   Respiratory:  Positive for shortness of breath. Negative for apnea, cough and chest tightness.    Cardiovascular:  Positive for chest pain. Negative for palpitations.   Gastrointestinal:  Positive for abdominal pain. Negative for diarrhea, nausea and vomiting.   Endocrine: Negative.    Genitourinary:  Negative for difficulty urinating and dysuria.   Musculoskeletal:  Negative for back pain, joint swelling and myalgias.   Skin:  Negative for color change and wound.   Allergic/Immunologic: Negative.    Neurological:  Positive for light-headedness and numbness. Negative for seizures, facial asymmetry, speech difficulty, weakness and headaches.   Psychiatric/Behavioral: Negative.     All other systems reviewed and are negative.       Physical Exam:  /78 (BP Location: Left arm, Patient Position: Sitting)   Pulse 90   Temp 98 °F (36.7 °C) (Oral)   Resp 20   Ht 177.8 cm (70\")   Wt 120 kg (265 lb 6.9 " oz)   SpO2 92%   BMI 38.09 kg/m²     Physical Exam  Vitals and nursing note reviewed.   Constitutional:       General: He is awake.      Appearance: Normal appearance. He is well-developed.   HENT:      Head: Normocephalic and atraumatic.      Nose: Nose normal.      Mouth/Throat:      Mouth: Mucous membranes are moist.   Eyes:      Extraocular Movements: Extraocular movements intact.      Pupils: Pupils are equal, round, and reactive to light.   Cardiovascular:      Rate and Rhythm: Normal rate and regular rhythm.      Heart sounds: Normal heart sounds.   Pulmonary:      Effort: Pulmonary effort is normal. No respiratory distress.      Breath sounds: Normal breath sounds. No wheezing, rhonchi or rales.   Abdominal:      General: Bowel sounds are normal.      Palpations: Abdomen is soft.      Tenderness: There is no abdominal tenderness. There is no guarding or rebound.      Comments: No rigidity   Musculoskeletal:         General: No tenderness. Normal range of motion.      Cervical back: Normal range of motion and neck supple.   Skin:     General: Skin is warm and dry.      Coloration: Skin is not jaundiced.   Neurological:      General: No focal deficit present.      Mental Status: He is alert. Mental status is at baseline.   Psychiatric:         Mood and Affect: Mood is anxious.                    Medical Decision Making:      Comorbidities that affect care:    Hypertension, type 2 diabetes, hyperlipidemia, atrial fibrillation, CAD    External Notes reviewed:    Hospital Discharge Summary:    SUMMARY     Patient Name: Toney Neri  : 1935  MRN: [id number]     Date of Admission: 2024  Date of Discharge:  2024     Primary Care Physician: Una Neves APRN     Consults         Date and Time Order Name Status Description     2024 10:04 PM Inpatient Hospitalist Consult         2024 11:13 PM Inpatient Neurology Consult Stroke Completed                  Active and Resolved  Hospital Problems:       Active Hospital Problems     Diagnosis POA    **Dizziness [R42] Yes       Resolved Hospital Problems   No resolved problems to display.         Hospital Course      Hospital Course:  Toney Neri is a 89 y.o. male with past medical history of hypertension, hyperlipidemia, CAD, atrial fibrillation, diabetes who presents to the emergency department for evaluation of dizziness. Patient was recently admitted for dizziness and stroke workup was completed and negative.      Patient admitted for dizziness overnight.  Review of recent inpatient stay shows normal MRI.  PT evaluated him and he had a positive [place] Hallpike maneuver on his left ear with rotational nystagmus indicating BPPV.  He was treated with Epley's remover 3 times with improvement.  Patient had no new neurologic symptoms, denying unilateral weakness or numbness, normal neuro exam.  Discussed repeating MRI with patient as I feel it is low yield, patient would like to defer repeat MRI.  Discussed with him that he would need continued PT outpatient for treatment.  Home PT was set up on recent discharge but he has not yet started his therapy.  Nursing to go over Epley maneuver and treatment exercises that he can do at home to help dizziness.  Vitamin D level was checked, he should follow-up with PCP for final results.     Patient discharged in stable condition.     DISCHARGE Follow Up Recommendations for labs and diagnostics: Follow-up with PCP in 1 week.       The following orders were placed and all results were independently analyzed by me:  Orders Placed This Encounter   Procedures    XR Chest 1 View    [place] Draw    High Sensitivity Troponin T    Comprehensive Metabolic Panel    Lipase    BNP    Magnesium    CBC Auto Differential    High Sensitivity Troponin T 1Hr    D-dimer, Quantitative    Undress & Gown    Continuous Pulse Oximetry    ECG 12 Lead ED Triage Standing Order; Chest Pain    CBC & Differential    Green Top (Gel)     Lavender Top    Gold Top - SST    Light Blue Top       Medications Given in the Emergency Department:  Medications   aspirin chewable tablet 324 mg (324 mg Oral Given 12/31/24 1547)   ipratropium-albuterol (DUO-NEB) nebulizer solution 3 mL (3 mL Nebulization Given 12/31/24 1956)        ED Course:    ED Course as of 01/01/25 0215   Tue Dec 31, 2024   1735 EKG interpretation: No acute ischemia, rate controlled atrial fibrillation [RP]      ED Course User Index  [RP] Raoul Medina MD       Labs:    Lab Results (last 24 hours)       Procedure Component Value Units Date/Time    High Sensitivity Troponin T [[id number]]  (Normal) Collected: 12/31/24 1527    Specimen: Blood from Arm, Right Updated: 12/31/24 1557     HS Troponin T 21 ng/L     Narrative:      High Sensitive Troponin T Reference Range:  <14.0 ng/L- Negative Female for AMI  <22.0 ng/L- Negative Male for AMI  >=14 - Abnormal Female indicating possible myocardial injury.  >=22 - Abnormal Male indicating possible myocardial injury.   Clinicians would have to utilize clinical acumen, EKG, Troponin, and serial changes to determine if it is an Acute Myocardial Infarction or myocardial injury due to an underlying chronic condition.         CBC & Differential [[id number]]  (Abnormal) Collected: 12/31/24 1527    Specimen: Blood from Arm, Right Updated: 12/31/24 1533    Narrative:      The following orders were created for panel order CBC & Differential.  Procedure                               Abnormality         Status                     ---------                               -----------         ------                     CBC Auto Differential[[id number]]        Abnormal            Final result                 Please view results for these tests on the individual orders.    Comprehensive Metabolic Panel [[id number]]  (Abnormal) Collected: 12/31/24 1527    Specimen: Blood from Arm, Right Updated: 12/31/24 1557     Glucose 174 mg/dL      BUN 15 mg/dL       Creatinine 1.18 mg/dL      Sodium 138 mmol/L      Potassium 3.9 mmol/L      Chloride 105 mmol/L      CO2 22.5 mmol/L      Calcium 8.6 mg/dL      Total Protein 6.6 g/dL      Albumin 3.9 g/dL      ALT (SGPT) 14 U/L      AST (SGOT) 18 U/L      Alkaline Phosphatase 57 U/L      Total Bilirubin 0.4 mg/dL      Globulin 2.7 gm/dL      A/G Ratio 1.4 g/dL      BUN/Creatinine Ratio 12.7     Anion Gap 10.5 mmol/L      eGFR 59.0 mL/min/1.73     Narrative:      GFR Categories in Chronic Kidney Disease (CKD)      GFR Category          GFR (mL/min/1.73)    Interpretation  G1                     90 or greater         Normal or high (1)  G2                      60-89                Mild decrease (1)  G3a                   45-59                Mild to moderate decrease  G3b                   30-44                Moderate to severe decrease  G4                    15-29                Severe decrease  G5                    14 or less           Kidney failure          (1)In the absence of evidence of kidney disease, neither GFR category G1 or G2 fulfill the criteria for CKD.    eGFR calculation 2021 CKD-EPI creatinine equation, which does not include race as a factor    Lipase [[id number]]  (Normal) Collected: 12/31/24 1527    Specimen: Blood from Arm, Right Updated: 12/31/24 1557     Lipase 43 U/L     BNP [[id number]]  (Abnormal) Collected: 12/31/24 1527    Specimen: Blood from Arm, Right Updated: 12/31/24 1555     proBNP 1,917.0 pg/mL     Narrative:      This assay is used as an aid in the diagnosis of individuals suspected of having heart failure. It can be used as an aid in the diagnosis of acute decompensated heart failure (ADHF) in patients presenting with signs and symptoms of ADHF to the emergency department (ED). In addition, NT-proBNP of <300 pg/mL indicates ADHF is not likely.    Age Range Result Interpretation  NT-proBNP Concentration (pg/mL:      <50             Positive            >450                   House                  300-450                    Negative             <300    50-75           Positive            >900                  Gray                300-900                  Negative            <300      >75             Positive            >1800                  Gray                300-1800                  Negative            <300    Magnesium [[id number]]  (Abnormal) Collected: 12/31/24 1527    Specimen: Blood from Arm, Right Updated: 12/31/24 1557     Magnesium 1.5 mg/dL     CBC Auto Differential [[id number]]  (Abnormal) Collected: 12/31/24 1527    Specimen: Blood from Arm, Right Updated: 12/31/24 1533     WBC 10.41 10*3/mm3      RBC 3.69 10*6/mm3      Hemoglobin 11.4 g/dL      Hematocrit 35.8 %      MCV 97.0 fL      MCH 30.9 pg      MCHC 31.8 g/dL      RDW 13.7 %      RDW-SD 48.5 fl      MPV 10.5 fL      Platelets 220 10*3/mm3      Neutrophil % 69.3 %      Lymphocyte % 19.2 %      Monocyte % 8.5 %      Eosinophil % 2.1 %      Basophil % 0.7 %      Immature Grans % 0.2 %      Neutrophils, Absolute 7.22 10*3/mm3      Lymphocytes, Absolute 2.00 10*3/mm3      Monocytes, Absolute 0.88 10*3/mm3      Eosinophils, Absolute 0.22 10*3/mm3      Basophils, Absolute 0.07 10*3/mm3      Immature Grans, Absolute 0.02 10*3/mm3      nRBC 0.0 /100 WBC     D-dimer, Quantitative [[id number]]  (Normal) Collected: 12/31/24 1527    Specimen: Blood from Arm, Right Updated: 12/31/24 1807     D-Dimer, Quantitative 0.67 MCGFEU/mL     Narrative:      According to the assay 's published package insert, a normal (<0.50 MCGFEU/mL) D-dimer result in conjunction with a non-high clinical probability assessment, excludes deep vein thrombosis (DVT) and pulmonary embolism (PE) with high sensitivity.    D-dimer values increase with age and this can make VTE exclusion of an older population difficult. To address this, the [employer], based on best available evidence and recent guidelines, recommends that clinicians use age-adjusted  "D-dimer thresholds in patients greater than 50 years of age with: a) a low probability of PE who do not meet all Pulmonary Embolism Rule Out Criteria, or b) in those with intermediate probability of PE.   The formula for an age-adjusted D-dimer cut-off is \"age/100\".  For example, a 60 year old patient would have an age-adjusted cut-off of 0.60 MCGFEU/mL and an 80 year old 0.80 MCGFEU/mL.    High Sensitivity Troponin T 1Hr [[id number]]  (Normal) Collected: 12/31/24 1629    Specimen: Blood from Arm, Right Updated: 12/31/24 1657     HS Troponin T 19 ng/L      Troponin T Numeric Delta -2 ng/L     Narrative:      High Sensitive Troponin T Reference Range:  <14.0 ng/L- Negative Female for AMI  <22.0 ng/L- Negative Male for AMI  >=14 - Abnormal Female indicating possible myocardial injury.  >=22 - Abnormal Male indicating possible myocardial injury.   Clinicians would have to utilize clinical acumen, EKG, Troponin, and serial changes to determine if it is an Acute Myocardial Infarction or myocardial injury due to an underlying chronic condition.                  Imaging:    XR Chest 1 View    Result Date: 12/31/2024  XR CHEST 1 VW Date of Exam: 12/31/2024 3:42 PM EST Indication: Chest Pain Triage Protocol Comparison: Chest AP dated 12/20/2024 Findings: Increased interstitial markings are noted in the lung bases bilaterally, similar to the previous study. No consolidation is identified.     Impression: Small left pleural effusion, unchanged. Increased basilar interstitial markings favored represent chronic interstitial lung disease. Electronically Signed: Isidro Valdez MD  12/31/2024 4:01 PM EST  Workstation ID: [id number]       Differential Diagnosis and Discussion:    Abdominal Pain: Based on the patient's signs and symptoms, I considered abdominal aortic aneurysm, small bowel obstruction, pancreatitis, acute cholecystitis, acute appendecitis, peptic ulcer disease, gastritis, colitis, endocrine disorders, irritable bowel " syndrome and other differential diagnosis an etiology of the patient's abdominal pain.  Chest Pain:  Based on the patient's signs and symptoms, I considered aortic dissection, myocardial infaction, pulmonary embolism, cardiac tamponade, pericarditis, pneumothorax, musculoskeletal chest pain and other differential diagnosis as an etiology of the patient's chest pain.     PROCEDURES:    Labs were collected in the emergency department and all labs were reviewed and interpreted by me.  X-ray were performed in the emergency department and all X-ray impressions were independently interpreted by me.  An EKG was performed and the EKG was interpreted by me.    ECG 12 Lead ED Triage Standing Order; Chest Pain   Preliminary Result   HEART RATE=82  bpm   RR Nmiuxanw=853  ms   MD Interval=  ms   P Horizontal Axis=  deg   P Front Axis=  deg   QRSD Interval=87  ms   QT Ecpntzbf=878  ms   NUsB=679  ms   QRS Axis=-15  deg   T Wave Axis=49  deg   - ABNORMAL ECG -   Atrial fibrillation   Borderline left axis deviation   Low voltage, precordial leads   Consider anterior infarct   Date and Time of Study:2024-12-31 15:23:36          Procedures    MDM                     Patient Care Considerations:    PERC: I used the PERC score to risk stratify the patient for PE and a CT of the chest was considered but ultimately not indicated in today's visit.      Consultants/Shared Management Plan:    None    Social Determinants of Health:    Patient has presented with family members who are responsible, reliable and will ensure follow up care.      Disposition and Care Coordination:    Discharged: The patient is suitable and stable for discharge with no need for consideration of admission.        Final diagnoses:   Acute bronchitis, unspecified organism        ED Disposition       ED Disposition   Discharge    Condition   Stable    Comment   --               This medical record created using voice recognition software.             Adam  MD Raoul  01/01/25 0212

## 2025-01-01 LAB
QT INTERVAL: 421 MS
QTC INTERVAL: 492 MS

## 2025-01-01 NOTE — DISCHARGE INSTRUCTIONS
Return to the emergency department immediately for worsening of symptoms.  As we discussed, your blood work today is very reassuring and your chest x-ray shows no obvious pneumonia.  Take the nebulizer treatments as we discussed and I did also prescribe an antibiotic in case this is a bacterial infection.  Stay well-hydrated.

## 2025-01-10 ENCOUNTER — READMISSION MANAGEMENT (OUTPATIENT)
Dept: CALL CENTER | Facility: HOSPITAL | Age: OVER 89
End: 2025-01-10
Payer: MEDICARE

## 2025-01-10 NOTE — OUTREACH NOTE
Medical Week 2 Survey      Flowsheet Row Responses   [facility] patient discharged from? [facility]   Does the patient have one of the following disease processes/diagnoses(primary or secondary)? Other   Week 2 attempt successful? Yes   Call start time 0931   Discharge diagnosis Dizziness   Call end time 0935   Meds reviewed with patient/caregiver? Yes   Prescription comments nebs added at ED visit 12/31/25   Is the patient taking all medications as directed (includes completed medication regime)? Yes   Does the patient have a primary care provider?  Yes   Has the patient kept scheduled appointments due by today? Yes   What is the Home health agency?  [facility]   Has home health visited the patient within 72 hours of discharge? Yes   Home health comments Pt still has P.T.   Psychosocial issues? No   Did the patient receive a copy of their discharge instructions? Yes   Nursing interventions Reviewed instructions with patient   What is the patient's perception of their health status since discharge? Same  [Pt still has dizziness, using caution with activity.  Pt still has HH P.T. assisting him.  Pt still with cough and has nebs for use.  Aware to monitor for worsening s/s.  Pt has no questions for this RN.]   Is the patient/caregiver able to teach back signs and symptoms related to disease process for when to call PCP? Yes   Is the patient/caregiver able to teach back signs and symptoms related to disease process for when to call 911? Yes   Week 2 Call Completed? Yes   Graduated Yes   Call end time 0935            DENICE ROMERO - Registered Nurse

## 2025-03-05 NOTE — PROGRESS NOTES
[facility]  Cardiology progress Note    Patient Name: Toney Neri  : 1935    CHIEF COMPLAINT  Atrial fibrillation        Subjective   Subjective     HISTORY OF PRESENT ILLNESS    Toney Neri is a 89 y.o. male with history of atrial fibrillation.  No palpitations.  He has chronic dizziness    REVIEW OF SYSTEMS    Constitutional:    No fever, no weight loss  Skin:     No rash  Otolaryngeal:    No difficulty swallowing  Cardiovascular: See HPI.  Pulmonary:    No cough, no sputum production    Personal History     Social History:    reports that he has quit smoking. His smoking use included cigarettes. He has never used smokeless tobacco. He reports current alcohol use. He reports that he does not use drugs.    Home Medications:  Current Outpatient Medications on File Prior to Visit   Medication Sig    acetaminophen (TYLENOL) 325 MG tablet Take 1 tablet by mouth Daily.    albuterol (ACCUNEB) 1.25 MG/3ML nebulizer solution Take 3 mL by nebulization Every 6 (Six) Hours As Needed for Shortness of Air or Wheezing.    albuterol sulfate  (90 Base) MCG/ACT inhaler Inhale 2 puffs Every 4 (Four) Hours As Needed for Wheezing.    Albuterol-Budesonide (Airsupra) 90-80 MCG/ACT aerosol Inhale 2 puffs Every 4 (Four) Hours As Needed (for wheezing or shortness of air).    apixaban (Eliquis) 5 MG tablet tablet Take 1 tablet by mouth 2 (two) times a day. .    Aspirin Low Dose 81 MG EC tablet Take 1 tablet by mouth Daily.    atenolol (TENORMIN) 50 MG tablet Take 1 tablet by mouth Every Morning.    carboxymethylcellulose (REFRESH PLUS) 0.5 % solution Administer 1 drop to both eyes 4 (Four) Times a Day.    doxycycline (MONODOX) 100 MG capsule Take 1 capsule by mouth 2 (Two) Times a Day.    finasteride (PROSCAR) 5 MG tablet Take 1 tablet by mouth Daily.    hydroCHLOROthiazide 25 MG tablet Take 1 tablet by mouth Daily.    loratadine (CLARITIN) 10 MG tablet Take 1 tablet by mouth Every Morning.     Magnesium Oxide 400 (240 Mg) MG tablet Take 1 tablet by mouth Daily.    meclizine (ANTIVERT) 25 MG tablet Take 1 tablet by mouth 3 times a day.    Melatonin 3 MG capsule Take 1 capsule by mouth 2 (Two) Times a Day.    metFORMIN (GLUCOPHAGE) 1000 MG tablet Take 1 tablet by mouth 2 (Two) Times a Day.    multivitamins-minerals (PRESERVISION AREDS 2) capsule capsule Take 1 capsule by mouth 2 (Two) Times a Day.    NIFEdipine CC (ADALAT CC) 90 MG 24 hr tablet Take 1 tablet by mouth Daily.    pantoprazole (PROTONIX) 40 MG EC tablet Take 1 tablet by mouth Daily.    potassium chloride 10 MEQ CR tablet Take 1 tablet by mouth Daily.    simvastatin (ZOCOR) 40 MG tablet Take 1 tablet by mouth Every Night.    tamsulosin (FLOMAX) 0.4 MG capsule 24 hr capsule Take 1 capsule by mouth Daily.    terazosin (HYTRIN) 10 MG capsule Take 1 capsule by mouth Every Night.    Trelegy Ellipta 200-62.5-25 MCG/ACT inhaler Inhale 1 puff Daily.    vitamin C (ASCORBIC ACID) 250 MG tablet Take 1 tablet by mouth Daily.     No current facility-administered medications on file prior to visit.       Past Medical History:   Diagnosis Date    Acid reflux     Arthritis     Atrial fibrillation     CHALLAPPA FOLLOWS PT,    Cancer     Coronary artery disease     NO INTERVENTION    Diabetes mellitus, type 2     METFORMIN    Elevated PSA     Former smoker     INHALERS: TRELEGY, ALBUTEROL, NON SPECIFIC LUNG HX    High cholesterol     Hypertension     Limb swelling     Myocardial infarction     NO INTERVENTION, APPROX 2010    Skin lesion     RECENT SKIN CA REMOVED FROM FACE    Sleep apnea     NO CPAP    Urinary stream slowing        Allergies:  No Known Allergies    Objective    Objective       Vitals:   Heart Rate:  [80] 80  BP: (86)/(42) 86/42  Body mass index is 36.59 kg/m².     PHYSICAL EXAM:    General Appearance:   well developed  well nourished  HENT:   oropharynx moist  lips not cyanotic  Neck:  thyroid not enlarged  supple  Respiratory:  no respiratory  distress  normal breath sounds  no rales  Cardiovascular:  no jugular venous distention  regular rhythm  apical impulse normal  S1 normal, S2 normal  no S3, no S4   no murmur  no rub, no thrill  carotid pulses normal; no bruit  pedal pulses normal  lower extremity edema: none    Skin:   warm, dry  Psychiatric:  judgement and insight appropriate  normal mood and affect        Result Review:  I have personally reviewed the available results from  [x]  Laboratory  [x]  EKG  [x]  Cardiology  [x]  Medications  [x]  Old records  []  Other:       ECG 12 Lead    Date/Time: 3/7/2025 11:46 AM  Performed by: Jacobo Sweet MD    Authorized by: Jacobo Sweet MD  Comparison: compared with previous ECG   Similar to previous ECG  Rhythm: atrial fibrillation  Ectopy: unifocal PVCs and bigeminy    Clinical impression: abnormal EKG        Lab Results   Component Value Date    CHOL 103 12/13/2024     Lab Results   Component Value Date    TRIG 133 12/13/2024    TRIG 107 02/10/2020     Lab Results   Component Value Date    HDL 44 12/13/2024    HDL 50 02/10/2020     Lab Results   Component Value Date    LDL 36 12/13/2024    LDL 30 (L) 02/10/2020     Lab Results   Component Value Date    VLDL 23 12/13/2024    VLDL 21 02/10/2020     Results for orders placed during the hospital encounter of 10/14/24    Adult Transthoracic Echo Complete W/ Cont if Necessary Per Protocol    Interpretation Summary  Normal left ventricular systolic function with mild left ventricular hypertrophy.  Mild aortic stenosis.  Mild mitral regurgitation.     Impression/Plan:   1.  Permanent atrial fibrillation with controlled heart rate: Continue Eliquis 5 mg twice a day.  Continue atenolol 50 mg once a day for rate control.  Able to tolerate Eliquis without any side effects.  2.  Mixed hyperlipidemia: Continue Zocor 40 mg once a day.  Monitor lipid and hepatic profile.  3.  Essential hypertension controlled: Decrease nifedipine to 30 mg once a day in  view of low blood pressure.  Monitor blood pressure regularly.  4.  Chronic dizziness: Blood pressure is running slightly low.  Will decrease nifedipine ER to 30 mg once a day.           Jacobo Sweet MD   03/07/25   11:40 EST

## 2025-03-07 ENCOUNTER — OFFICE VISIT (OUTPATIENT)
Dept: CARDIOLOGY | Facility: CLINIC | Age: OVER 89
End: 2025-03-07
Payer: MEDICARE

## 2025-03-07 ENCOUNTER — TELEPHONE (OUTPATIENT)
Dept: CARDIOLOGY | Facility: CLINIC | Age: OVER 89
End: 2025-03-07

## 2025-03-07 VITALS
WEIGHT: 255 LBS | SYSTOLIC BLOOD PRESSURE: 86 MMHG | DIASTOLIC BLOOD PRESSURE: 42 MMHG | HEIGHT: 70 IN | BODY MASS INDEX: 36.51 KG/M2 | HEART RATE: 80 BPM

## 2025-03-07 DIAGNOSIS — I10 HYPERTENSION, ESSENTIAL: ICD-10-CM

## 2025-03-07 DIAGNOSIS — E78.2 HYPERLIPEMIA, MIXED: ICD-10-CM

## 2025-03-07 DIAGNOSIS — I48.21 PERMANENT ATRIAL FIBRILLATION: Primary | ICD-10-CM

## 2025-03-07 PROCEDURE — 1159F MED LIST DOCD IN RCRD: CPT | Performed by: SPECIALIST

## 2025-03-07 PROCEDURE — 1160F RVW MEDS BY RX/DR IN RCRD: CPT | Performed by: SPECIALIST

## 2025-03-07 PROCEDURE — 99214 OFFICE O/P EST MOD 30 MIN: CPT | Performed by: SPECIALIST

## 2025-03-07 PROCEDURE — 93000 ELECTROCARDIOGRAM COMPLETE: CPT | Performed by: SPECIALIST

## 2025-03-07 RX ORDER — NIFEDIPINE 30 MG/1
30 TABLET, EXTENDED RELEASE ORAL DAILY
Qty: 30 TABLET | Refills: 0 | Status: SHIPPED | OUTPATIENT
Start: 2025-03-07 | End: 2025-03-10

## 2025-03-07 RX ORDER — NIFEDIPINE 30 MG
30 TABLET, EXTENDED RELEASE ORAL DAILY
Status: DISCONTINUED | OUTPATIENT
Start: 2025-03-07 | End: 2025-03-07

## 2025-03-07 NOTE — TELEPHONE ENCOUNTER
Pt notified of instructions. Pt was unsure which pill he needed to split or discontinue. Pt's Son, Sabino contacted and given instructions. Pt's son and daughter in law verbalized understanding and stated they will take care of Pt's medication change.    Full liquid diet.  Nothing hot.

## 2025-03-07 NOTE — TELEPHONE ENCOUNTER
"  Caller: Toney Neri \"Rudy\"    Relationship: Self    Best call back number:   Telephone Information:   Mobile [phone]     What is the best time to reach you: ANY    Who are you requesting to speak with (clinical staff, provider,  specific staff member): ANY    What was the call regarding: PATIENT WENT TO PICK HIS NEW PRESCRIPTION UP AFTER HIS VISIT TODAY AND IT WAS NOT THERE.  HE WOULD LIKE TO MAKE SURE THAT IT IS SENT TO THE PHARMACY TODAY.   [facility] PLEASE FT GUERRERO NIFEDIPINE 30MG.  PLEASE REACH OUT TO LET HIM KNOW THIS IS DONE.      "

## 2025-03-07 NOTE — TELEPHONE ENCOUNTER
"Per Dr. Sweet's note: \"Decrease nifedipine to 30 mg once a day in view of low blood pressure \"    Pt unable to  new Rx until Monday.   Should Pt Hold until Monday or cut his 90mg tablets in half for 45mg?     Please advise.     "

## 2025-03-10 RX ORDER — NIFEDIPINE 30 MG
30 TABLET, EXTENDED RELEASE ORAL DAILY
Start: 2025-03-10

## 2025-03-10 NOTE — TELEPHONE ENCOUNTER
Received phone call from [facility]. Incorrect nifedipine prescription sent. New RX sent for nifedipine cc 30 mg daily per note.

## 2025-04-16 ENCOUNTER — PROCEDURE VISIT (OUTPATIENT)
Dept: UROLOGY | Age: OVER 89
End: 2025-04-16
Payer: MEDICARE

## 2025-04-16 VITALS — HEIGHT: 70 IN | WEIGHT: 255 LBS | BODY MASS INDEX: 36.51 KG/M2

## 2025-04-16 DIAGNOSIS — N40.1 BENIGN PROSTATIC HYPERPLASIA WITH INCOMPLETE BLADDER EMPTYING: Primary | ICD-10-CM

## 2025-04-16 DIAGNOSIS — R39.14 BENIGN PROSTATIC HYPERPLASIA WITH INCOMPLETE BLADDER EMPTYING: Primary | ICD-10-CM

## 2025-04-16 LAB
BILIRUB BLD-MCNC: NEGATIVE MG/DL
CLARITY, POC: CLEAR
COLOR UR: YELLOW
EXPIRATION DATE: NORMAL
GLUCOSE UR STRIP-MCNC: NEGATIVE MG/DL
KETONES UR QL: NEGATIVE
LEUKOCYTE EST, POC: NEGATIVE
Lab: NORMAL
NITRITE UR-MCNC: NEGATIVE MG/ML
PH UR: 6 [PH] (ref 5–8)
PROT UR STRIP-MCNC: NEGATIVE MG/DL
RBC # UR STRIP: NEGATIVE /UL
SP GR UR: 1.01 (ref 1–1.03)
UROBILINOGEN UR QL: NORMAL

## 2025-04-16 NOTE — PROGRESS NOTES
The patient presents for follow-up of superficial bladder cancer.      The patient was last seen three months ago . The patient is scheduled for repeat cysto and u/a . Cystoscopy on the last visit showed low grade superficial UC.      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       Cystoscopy in June 2021 was normal with no residual tumor seen.    Cysto in Dec 2021 was normal with no tumor seen.   Cysto in Aug 2022 with erythema  Cysto/TURBT in Nov 2022 negative for tumor   Cysto/TURBT in March 2023 negative for tumor, retrogrades normal  Cysto in July 2023 normal  Cysto in Feb 2024 normal  Cysto/retrogrades in Oct 2024 normal (negative resection of bladder)        Cytoscopy Procedure:     Procedure: Flexible cytoscope was passed per urethra into the bladder without difficulty after proper consent. The bladder was inspected in a systematic meridian fashion. There were no tumors, lesions, stones, or other abnormalities noted within the bladder. Of note, there was no increased vascularity as well. Both ureteral orifices were identified and were normal in appearance. The flexible cytoscope was removed. The patient tolerated the procedure well.

## 2025-06-16 ENCOUNTER — OFFICE VISIT (OUTPATIENT)
Dept: CARDIOLOGY | Facility: CLINIC | Age: OVER 89
End: 2025-06-16
Payer: MEDICARE

## 2025-06-16 VITALS
WEIGHT: 246.4 LBS | DIASTOLIC BLOOD PRESSURE: 93 MMHG | HEART RATE: 75 BPM | BODY MASS INDEX: 35.28 KG/M2 | SYSTOLIC BLOOD PRESSURE: 182 MMHG | HEIGHT: 70 IN

## 2025-06-16 DIAGNOSIS — I48.21 PERMANENT ATRIAL FIBRILLATION: Primary | ICD-10-CM

## 2025-06-16 DIAGNOSIS — I10 HYPERTENSION, ESSENTIAL: ICD-10-CM

## 2025-06-16 PROBLEM — J44.9 COPD (CHRONIC OBSTRUCTIVE PULMONARY DISEASE): Status: ACTIVE | Noted: 2025-06-16

## 2025-06-16 PROBLEM — H54.8 LEGAL BLINDNESS: Status: ACTIVE | Noted: 2024-10-14

## 2025-06-16 PROCEDURE — 1159F MED LIST DOCD IN RCRD: CPT | Performed by: NURSE PRACTITIONER

## 2025-06-16 PROCEDURE — 99213 OFFICE O/P EST LOW 20 MIN: CPT | Performed by: NURSE PRACTITIONER

## 2025-06-16 PROCEDURE — 1160F RVW MEDS BY RX/DR IN RCRD: CPT | Performed by: NURSE PRACTITIONER

## 2025-06-16 RX ORDER — MUPIROCIN 2 %
1 OINTMENT (GRAM) TOPICAL 2 TIMES DAILY
COMMUNITY
Start: 2025-06-04

## 2025-06-16 NOTE — PROGRESS NOTES
Chief Complaint  Follow-up, Atrial Fibrillation, Hypertension, and Hyperlipidemia    Subjective            History of Present Illness  Toney Neri is an 89-year-old male patient who presents to the office today for acute visit.    History of Present Illness  He has hypertension presenting with concerns about blood pressure management and dizziness.     The patient reports not having monitored his blood pressure in the past 2 to 3 days. Typically, his systolic blood pressure starts around 160, and after a brief pause, it decreases to approximately 140. His diastolic blood pressure fluctuates between 76 and 94. He recalls an episode of significantly low blood pressure, which he believes was a first-time occurrence. He is currently on Eliquis as a blood thinner, atenolol 50 mg daily, hydrochlorothiazide 25 mg daily, and nifedipine 30 mg daily. He was previously on nifedipine 90 mg, but the dosage was reduced to 30 mg. However, his doctor at [facility] recently increased the dosage back to 60 mg. He takes all his medications concurrently.    The patient also experiences vertigo, which makes it challenging to determine if his dizziness is a side effect of the nifedipine or a symptom of vertigo. 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 He has a  who visits three times a week and could potentially assist with blood pressure monitoring.        PMH  Past Medical History:   Diagnosis Date    Acid reflux     Arthritis     Atrial fibrillation     CHALLAPPA FOLLOWS PT,    Cancer     Coronary artery disease     NO INTERVENTION    Diabetes mellitus, type 2     METFORMIN    Elevated PSA     Former smoker     INHALERS: TRELEGY, ALBUTEROL, NON SPECIFIC LUNG HX    High cholesterol     Hypertension     Limb swelling     Myocardial infarction     NO INTERVENTION, APPROX 2010    Skin lesion     RECENT SKIN CA REMOVED FROM FACE    Sleep apnea     NO CPAP    Urinary stream slowing          ALLERGY  No Known  Allergies       SURGICALHX  Past Surgical History:   Procedure Laterality Date    ANKLE SURGERY Right 1989    ankle repair    BICEPS TENDON REPAIR Right     COLONOSCOPY  2012    CYSTOSCOPY      CYSTOSCOPY RETROGRADE PYELOGRAM Bilateral 4/26/2022    Procedure: CYSTOSCOPY RETROGRADE PYELOGRAM;  Surgeon: Yoli Le MD;  Location: [facility] MAIN OR;  Service: Urology;  Laterality: Bilateral;    CYSTOSCOPY RETROGRADE PYELOGRAM Bilateral 3/21/2023    Procedure: CYSTOSCOPY RETROGRADE PYELOGRAM;  Surgeon: Yoli Le MD;  Location: [facility] MAIN OR;  Service: Urology;  Laterality: Bilateral;    CYSTOSCOPY RETROGRADE PYELOGRAM Bilateral 10/10/2024    Procedure: CYSTOSCOPY RETROGRADE PYELOGRAM;  Surgeon: Yoli Le MD;  Location: [facility] MAIN OR;  Service: Urology;  Laterality: Bilateral;    ENDOSCOPY  2018 2020    GALLBLADDER SURGERY      ROTATOR CUFF REPAIR Bilateral 1988 1990    TONSILLECTOMY      TRANSURETHRAL RESECTION OF BLADDER TUMOR N/A 08/19/2021    Procedure: CYSTOSCOPY TRANSURETHRAL RESECTION OF BLADDER TUMOR;  Surgeon: Yoli Le MD;  Location: [facility] MA[facility] OR;  Service: Urology;  Laterality: N/A;    TRANSURETHRAL RESECTION OF BLADDER TUMOR N/A 4/26/2022    Procedure: CYSTOSCOPY TRANSURETHRAL RESECTION OF BLADDER TUMOR;  Surgeon: Yoli Le MD;  Location: [facility] MA[facility] OR;  Service: Urology;  Laterality: N/A;    TRANSURETHRAL RESECTION OF BLADDER TUMOR N/A 11/10/2022    Procedure: CYSTOSCOPY TRANSURETHRAL RESECTION OF BLADDER TUMOR;  Surgeon: Yoli Le MD;  Location: [facility] MA[facility] OR;  Service: Urology;  Laterality: N/A;    TRANSURETHRAL RESECTION OF BLADDER TUMOR N/A 3/21/2023    Procedure: CYSTOSCOPY TRANSURETHRAL RESECTION OF BLADDER TUMOR;  Surgeon: Yoli Le MD;  Location: [facility] MA[facility] OR;  Service: Urology;  Laterality: N/A;          SOC  Social History     Socioeconomic History    Marital status:    Tobacco Use    Smoking status: Former     Types:  Cigarettes    Smokeless tobacco: Never    Tobacco comments:     smoked 21-30 years   Vaping Use    Vaping status: Never Used   Substance and Sexual Activity    Alcohol use: Yes     Comment: rarely drinks less than 1 drink per day has been drinking for 31 or more years    Drug use: Never    Sexual activity: Defer         FAMHX  Family History   Problem Relation Age of Onset    Heart disease Mother     Arthritis Mother     Prostate cancer Father     Arthritis Father     Prostate cancer Son     Diabetes Other     Malig Hyperthermia Neg Hx           MEDSIGONLY  Current Outpatient Medications on File Prior to Visit   Medication Sig    acetaminophen (TYLENOL) 325 MG tablet Take 1 tablet by mouth Daily.    albuterol (ACCUNEB) 1.25 MG/3ML nebulizer solution Take 3 mL by nebulization Every 6 (Six) Hours As Needed for Shortness of Air or Wheezing.    albuterol sulfate  (90 Base) MCG/ACT inhaler Inhale 2 puffs Every 4 (Four) Hours As Needed for Wheezing.    Albuterol-Budesonide (Airsupra) 90-80 MCG/ACT aerosol Inhale 2 puffs Every 4 (Four) Hours As Needed (for wheezing or shortness of air).    apixaban (Eliquis) 5 MG tablet tablet Take 1 tablet by mouth 2 (two) times a day. .    Aspirin Low Dose 81 MG EC tablet Take 1 tablet by mouth Daily.    atenolol (TENORMIN) 50 MG tablet Take 1 tablet by mouth Every Morning.    carboxymethylcellulose (REFRESH PLUS) 0.5 % solution Administer 1 drop to both eyes 4 (Four) Times a Day.    doxycycline (MONODOX) 100 MG capsule Take 1 capsule by mouth 2 (Two) Times a Day.    finasteride (PROSCAR) 5 MG tablet Take 1 tablet by mouth Daily.    hydroCHLOROthiazide 25 MG tablet Take 1 tablet by mouth Daily.    loratadine (CLARITIN) 10 MG tablet Take 1 tablet by mouth Every Morning.    Magnesium Oxide 400 (240 Mg) MG tablet Take 1 tablet by mouth Daily.    meclizine (ANTIVERT) 25 MG tablet Take 1 tablet by mouth 3 times a day.    Melatonin 3 MG capsule Take 1 capsule by mouth 2 (Two) Times a  "Day.    metFORMIN (GLUCOPHAGE) 1000 MG tablet Take 1 tablet by mouth 2 (Two) Times a Day.    multivitamins-minerals (PRESERVISION AREDS 2) capsule capsule Take 1 capsule by mouth 2 (Two) Times a Day.    mupirocin (BACTROBAN) 2 % ointment Apply 1 Application topically to the appropriate area as directed 2 (Two) Times a Day.    NIFEdipine CC (ADALAT CC) 30 MG 24 hr tablet Take 1 tablet by mouth Daily.    pantoprazole (PROTONIX) 40 MG EC tablet Take 1 tablet by mouth Daily.    potassium chloride 10 MEQ CR tablet Take 1 tablet by mouth Daily.    simvastatin (ZOCOR) 40 MG tablet Take 1 tablet by mouth Every Night.    tamsulosin (FLOMAX) 0.4 MG capsule 24 hr capsule Take 1 capsule by mouth Daily.    terazosin (HYTRIN) 10 MG capsule Take 1 capsule by mouth Every Night.    Trelegy Ellipta 200-62.5-25 MCG/ACT inhaler Inhale 1 puff Daily.    vitamin C (ASCORBIC ACID) 250 MG tablet Take 1 tablet by mouth Daily.     No current facility-administered medications on file prior to visit.       Objective   BP (!) 182/93   Pulse 75   Ht 177.8 cm (70\")   Wt 112 kg (246 lb 6.4 oz)   BMI 35.35 kg/m²       Physical Exam  HENT:      Head: Normocephalic.   Neck:      Vascular: No carotid bruit.   Cardiovascular:      Rate and Rhythm: Normal rate. Rhythm irregular.      Pulses: Normal pulses.      Heart sounds: Normal heart sounds. No murmur heard.  Pulmonary:      Effort: Pulmonary effort is normal.      Breath sounds: Normal breath sounds.   Musculoskeletal:      Cervical back: Neck supple.      Right lower leg: No edema.      Left lower leg: No edema.   Skin:     General: Skin is dry.   Neurological:      Mental Status: He is alert and oriented to person, place, and time.   Psychiatric:         Behavior: Behavior normal.         Result Review :   The following data was reviewed by: NINO Velarde on 06/16/2025:  proBNP   Date Value Ref Range Status   12/31/2024 1,917.0 (H) 0.0 - 1,800.0 pg/mL Final     CMP          12/20/2024 " "   20:01 12/21/2024    04:03 12/31/2024    15:27   CMP   Glucose 181  232  174    BUN 21  18  15    Creatinine 1.36  1.23  1.18    EGFR 49.7  56.1  59.0    Sodium 137  139  138    Potassium 4.3  3.8  3.9    Chloride 101  104  105    Calcium 8.6  8.8  8.6    Total Protein 6.8  6.7  6.6    Albumin 3.8  3.7  3.9    Globulin 3.0  3.0  2.7    Total Bilirubin 0.3  0.4  0.4    Alkaline Phosphatase 60  59  57    AST (SGOT) 21  16  18    ALT (SGPT) 13  12  14    Albumin/Globulin Ratio 1.3  1.2  1.4    BUN/Creatinine Ratio 15.4  14.6  12.7    Anion Gap 11.4  10.8  10.5      CBC w/diff          12/20/2024    19:06 12/21/2024    04:03 12/31/2024    15:27   CBC w/Diff   WBC 12.26  11.70  10.41    RBC 3.90  3.92  3.69    Hemoglobin 12.1  11.8  11.4    Hematocrit 38.0  37.8  35.8    MCV 97.4  96.4  97.0    MCH 31.0  30.1  30.9    MCHC 31.8  31.2  31.8    RDW 13.3  13.4  13.7    Platelets 225  217  220    Neutrophil Rel % 65.1  68.6  69.3    Immature Granulocyte Rel % 0.4  0.3  0.2    Lymphocyte Rel % 23.3  19.7  19.2    Monocyte Rel % 9.2  9.3  8.5    Eosinophil Rel % 1.5  1.6  2.1    Basophil Rel % 0.5  0.5  0.7       No results found for: \"TSH\"   No results found for: \"FREET4\"   D-Dimer, Quantitative   Date Value Ref Range Status   12/31/2024 0.67 0.00 - 0.89 MCGFEU/mL Final     Magnesium   Date Value Ref Range Status   12/31/2024 1.5 (L) 1.6 - 2.4 mg/dL Final      No results found for: \"DIGOXIN\"   Lab Results   Component Value Date    TROPONINT 19 12/31/2024           Lipid Panel          12/13/2024    04:36   Lipid Panel   Total Cholesterol 103    Triglycerides 133    HDL Cholesterol 44    VLDL Cholesterol 23    LDL Cholesterol  36    LDL/HDL Ratio 0.74        Results for orders placed during the hospital encounter of 10/14/24    Adult Transthoracic Echo Complete W/ Cont if Necessary Per Protocol    Interpretation Summary  Normal left ventricular systolic function with mild left ventricular hypertrophy.  Mild aortic " stenosis.  Mild mitral regurgitation.      [id number] Score: 7          Assessment and Plan    Diagnoses and all orders for this visit:    1. Permanent atrial fibrillation (Primary)    2. Hypertension, essential      Assessment & Plan  - Hypertension & dizziness  Adjust medication regimen. Monitor blood pressure 2 hours post-medication intake. Blood pressure log for 2 weeks. He will take Atenolol and hydrochlorothiazide in the morning, nifedipine in the evening. Instructions given for returning blood pressure log via mail, fax, or in person.      Follow Up   Return in about 6 months (around 12/16/2025) for Follow up with Dr Sweet.    Patient was given instructions and counseling regarding his condition or for health maintenance advice. Please see specific information pulled into the AVS if appropriate.     Toney Neri  reports that he has quit smoking. His smoking use included cigarettes. He has never used smokeless tobacco.            Patient or patient representative verbalized consent for the use of Ambient Listening during the visit with  NINO Velarde for chart documentation. 6/28/2025  15:21 EDT    NINO Velarde  06/16/25  11:16 EDT    Dictated Utilizing Dragon Dictation

## 2025-07-02 ENCOUNTER — RESULTS FOLLOW-UP (OUTPATIENT)
Dept: CARDIOLOGY | Facility: CLINIC | Age: OVER 89
End: 2025-07-02
Payer: MEDICARE

## 2025-07-02 NOTE — TELEPHONE ENCOUNTER
Per Amparo:  [facility] have been reviewed from November 2024 and shows stable kidney and electrolyte levels. Liver enzymes are in normal range. Cholesterol is in normal range. Continue current medications     Spoke with patient, patient is aware and verbalized understanding.

## 2025-07-10 ENCOUNTER — TELEPHONE (OUTPATIENT)
Dept: CARDIOLOGY | Facility: CLINIC | Age: OVER 89
End: 2025-07-10
Payer: MEDICARE

## 2025-07-10 NOTE — TELEPHONE ENCOUNTER
----- Message from Amparo Arrington sent at 7/9/2025  9:30 AM EDT -----  BP log reviewed, readings appear to be improved/stable, continue current medications  ----- Message -----  From: Nelly Lugo RegSched Rep  Sent: 7/9/2025   9:19 AM EDT  To: NINO Cordon

## 2025-08-18 ENCOUNTER — PROCEDURE VISIT (OUTPATIENT)
Dept: UROLOGY | Age: OVER 89
End: 2025-08-18
Payer: MEDICARE

## 2025-08-18 VITALS — WEIGHT: 246 LBS | BODY MASS INDEX: 35.22 KG/M2 | HEIGHT: 70 IN

## 2025-08-18 DIAGNOSIS — C67.8 MALIGNANT NEOPLASM OF OVERLAPPING SITES OF BLADDER: Primary | ICD-10-CM

## 2025-08-18 LAB
BILIRUB BLD-MCNC: NEGATIVE MG/DL
CLARITY, POC: CLEAR
COLOR UR: YELLOW
EXPIRATION DATE: ABNORMAL
GLUCOSE UR STRIP-MCNC: NEGATIVE MG/DL
KETONES UR QL: NEGATIVE
LEUKOCYTE EST, POC: NEGATIVE
Lab: ABNORMAL
NITRITE UR-MCNC: NEGATIVE MG/ML
PH UR: 6 [PH] (ref 5–8)
PROT UR STRIP-MCNC: ABNORMAL MG/DL
RBC # UR STRIP: NEGATIVE /UL
SP GR UR: 1.01 (ref 1–1.03)
UROBILINOGEN UR QL: ABNORMAL

## 2025-08-18 PROCEDURE — 52000 CYSTOURETHROSCOPY: CPT | Performed by: UROLOGY

## 2025-08-18 PROCEDURE — 81003 URINALYSIS AUTO W/O SCOPE: CPT | Performed by: UROLOGY

## (undated) DEVICE — SKIN PREP TRAY W/CHG: Brand: MEDLINE INDUSTRIES, INC.

## (undated) DEVICE — SOL IRR NACL 0.9PCT 3000ML

## (undated) DEVICE — TRY PREP SCRB VAG PVP

## (undated) DEVICE — Device

## (undated) DEVICE — HF-RESECTION ELECTRODE PLASMALOOP LOOP, MEDIUM, LONG, 24 FR., 12°, PK TURIS: Brand: OLYMPUS

## (undated) DEVICE — EVAC BLDR ELLIK 1P/U

## (undated) DEVICE — CATH URETRL PA CONE TP 8F

## (undated) DEVICE — TOWEL,OR,DSP,ST,BLUE,STD,4/PK,20PK/CS: Brand: MEDLINE

## (undated) DEVICE — GOWN,REINFORCE,POLY,SIRUS,BREATH SLV,XLG: Brand: MEDLINE

## (undated) DEVICE — Y-TYPE TUR/BLADDER IRRIGATION SET, REGULATING CLAMP

## (undated) DEVICE — SYRINGE,TOOMEY,IRRIGATION,70CC,STERILE: Brand: MEDLINE

## (undated) DEVICE — SOL IRRG H2O BG 3000ML STRL

## (undated) DEVICE — GLV SURG SENSICARE SLT PF LF 6.5 STRL

## (undated) DEVICE — CYSTO PACK: Brand: MEDLINE INDUSTRIES, INC.

## (undated) DEVICE — HF-RESECTION ELECTRODE PLASMALOOP LOOP, MEDIUM, 24 FR., 12°/16°, PK TURIS: Brand: OLYMPUS

## (undated) DEVICE — MAT FLR ABS W/BLU/LINER 56X72IN WHT

## (undated) DEVICE — MEDICINE CUP, GRADUATED, STER: Brand: MEDLINE

## (undated) DEVICE — CYSTO/BLADDER IRRIGATION SET, REGULATING CLAMP

## (undated) DEVICE — CUP SPECI 4OZ LF STRL

## (undated) DEVICE — TRAY,IRRIGATION,BULBSYRINGE,60ML,CSR,PVP: Brand: MEDLINE

## (undated) DEVICE — CATH URETH FLEXIMA CONE TP 5F 70CM

## (undated) DEVICE — GLOVE,SURG,SENSICARE SLT,LF,PF,6.5: Brand: MEDLINE